# Patient Record
Sex: FEMALE | Race: WHITE | NOT HISPANIC OR LATINO | Employment: FULL TIME | URBAN - METROPOLITAN AREA
[De-identification: names, ages, dates, MRNs, and addresses within clinical notes are randomized per-mention and may not be internally consistent; named-entity substitution may affect disease eponyms.]

---

## 2017-01-23 DIAGNOSIS — M54.9 CHRONIC BACK PAIN GREATER THAN 3 MONTHS DURATION: ICD-10-CM

## 2017-01-23 DIAGNOSIS — M47.896 OTHER OSTEOARTHRITIS OF SPINE, LUMBAR REGION: ICD-10-CM

## 2017-01-23 DIAGNOSIS — G89.29 CHRONIC BACK PAIN GREATER THAN 3 MONTHS DURATION: ICD-10-CM

## 2017-01-23 RX ORDER — HYDROCODONE BITARTRATE AND ACETAMINOPHEN 5; 325 MG/1; MG/1
1 TABLET ORAL EVERY 8 HOURS PRN
Qty: 90 TAB | Refills: 0 | OUTPATIENT
Start: 2017-01-23

## 2017-01-23 RX ORDER — HYDROCODONE BITARTRATE AND ACETAMINOPHEN 5; 325 MG/1; MG/1
1 TABLET ORAL EVERY 8 HOURS PRN
Qty: 90 TAB | Refills: 0 | Status: SHIPPED | OUTPATIENT
Start: 2017-01-23 | End: 2017-04-05 | Stop reason: SDUPTHER

## 2017-01-23 NOTE — TELEPHONE ENCOUNTER
Was the patient seen in the last year in this department? Yes 10/26/2016    Does patient have an active prescription for medications requested? no    Received Request Via: Patient

## 2017-01-24 ENCOUNTER — HOSPITAL ENCOUNTER (OUTPATIENT)
Dept: RADIOLOGY | Facility: MEDICAL CENTER | Age: 57
End: 2017-01-24
Attending: NURSE PRACTITIONER
Payer: COMMERCIAL

## 2017-01-24 DIAGNOSIS — Z12.39 SCREENING FOR BREAST CANCER: ICD-10-CM

## 2017-01-24 PROCEDURE — G0202 SCR MAMMO BI INCL CAD: HCPCS

## 2017-01-25 DIAGNOSIS — R92.8 ABNORMAL MAMMOGRAM OF LEFT BREAST: ICD-10-CM

## 2017-01-27 ENCOUNTER — HOSPITAL ENCOUNTER (OUTPATIENT)
Dept: RADIOLOGY | Facility: MEDICAL CENTER | Age: 57
End: 2017-01-27
Attending: NURSE PRACTITIONER
Payer: COMMERCIAL

## 2017-01-27 DIAGNOSIS — R92.8 ABNORMAL MAMMOGRAM OF LEFT BREAST: ICD-10-CM

## 2017-01-27 PROCEDURE — G0206 DX MAMMO INCL CAD UNI: HCPCS | Mod: LT

## 2017-01-27 PROCEDURE — 76642 ULTRASOUND BREAST LIMITED: CPT | Mod: LT

## 2017-03-10 DIAGNOSIS — M47.896 OTHER OSTEOARTHRITIS OF SPINE, LUMBAR REGION: ICD-10-CM

## 2017-03-10 DIAGNOSIS — G89.29 CHRONIC BACK PAIN GREATER THAN 3 MONTHS DURATION: ICD-10-CM

## 2017-03-10 DIAGNOSIS — M54.9 CHRONIC BACK PAIN GREATER THAN 3 MONTHS DURATION: ICD-10-CM

## 2017-03-10 RX ORDER — HYDROCODONE BITARTRATE AND ACETAMINOPHEN 5; 325 MG/1; MG/1
1 TABLET ORAL EVERY 8 HOURS PRN
Qty: 90 TAB | Refills: 0 | Status: SHIPPED | OUTPATIENT
Start: 2017-03-10 | End: 2017-04-05 | Stop reason: SDUPTHER

## 2017-03-11 NOTE — TELEPHONE ENCOUNTER
Notified Pt on approval and apt needed. States she will make appointment when she comes in to  the rx.

## 2017-04-05 ENCOUNTER — HOSPITAL ENCOUNTER (OUTPATIENT)
Facility: MEDICAL CENTER | Age: 57
End: 2017-04-05
Attending: NURSE PRACTITIONER
Payer: COMMERCIAL

## 2017-04-05 ENCOUNTER — OFFICE VISIT (OUTPATIENT)
Dept: MEDICAL GROUP | Facility: PHYSICIAN GROUP | Age: 57
End: 2017-04-05
Payer: COMMERCIAL

## 2017-04-05 VITALS
WEIGHT: 185 LBS | DIASTOLIC BLOOD PRESSURE: 80 MMHG | OXYGEN SATURATION: 99 % | HEIGHT: 66 IN | TEMPERATURE: 98.8 F | HEART RATE: 61 BPM | SYSTOLIC BLOOD PRESSURE: 126 MMHG | BODY MASS INDEX: 29.73 KG/M2 | RESPIRATION RATE: 16 BRPM

## 2017-04-05 DIAGNOSIS — G89.29 CHRONIC BACK PAIN GREATER THAN 3 MONTHS DURATION: Primary | ICD-10-CM

## 2017-04-05 DIAGNOSIS — Z79.891 CHRONIC USE OF OPIATE DRUGS THERAPEUTIC PURPOSES: ICD-10-CM

## 2017-04-05 DIAGNOSIS — M76.31 IT BAND SYNDROME, RIGHT: ICD-10-CM

## 2017-04-05 DIAGNOSIS — E66.9 OBESITY (BMI 30.0-34.9): ICD-10-CM

## 2017-04-05 DIAGNOSIS — M54.9 CHRONIC BACK PAIN GREATER THAN 3 MONTHS DURATION: Primary | ICD-10-CM

## 2017-04-05 DIAGNOSIS — M47.896 OTHER OSTEOARTHRITIS OF SPINE, LUMBAR REGION: ICD-10-CM

## 2017-04-05 PROCEDURE — G0480 DRUG TEST DEF 1-7 CLASSES: HCPCS

## 2017-04-05 PROCEDURE — 99214 OFFICE O/P EST MOD 30 MIN: CPT | Performed by: NURSE PRACTITIONER

## 2017-04-05 PROCEDURE — 80307 DRUG TEST PRSMV CHEM ANLYZR: CPT

## 2017-04-05 RX ORDER — HYDROCODONE BITARTRATE AND ACETAMINOPHEN 5; 325 MG/1; MG/1
1 TABLET ORAL EVERY 8 HOURS PRN
Qty: 90 TAB | Refills: 0 | Status: SHIPPED | OUTPATIENT
Start: 2017-04-05 | End: 2017-07-18 | Stop reason: SDUPTHER

## 2017-04-05 RX ORDER — HYDROCODONE BITARTRATE AND ACETAMINOPHEN 5; 325 MG/1; MG/1
1 TABLET ORAL EVERY 8 HOURS PRN
Qty: 90 TAB | Refills: 0 | Status: SHIPPED | OUTPATIENT
Start: 2017-04-05 | End: 2017-07-18

## 2017-04-05 ASSESSMENT — ENCOUNTER SYMPTOMS: DEPRESSION: 0

## 2017-04-05 ASSESSMENT — LIFESTYLE VARIABLES: HISTORY_ALCOHOL_USE: 0

## 2017-04-05 NOTE — PATIENT INSTRUCTIONS
Iliotibial Band Syndrome  Iliotibial band syndrome is pain in the outer, lower thigh. The pain is caused by an inflammation of the iliotibial band. This is a band of thick fibrous tissue that runs down the outside of the thigh. The iliotibial band begins at the hip. It extends to the outer side of the shin bone (tibia) just below the knee joint. The band works with the thigh muscles. Together they provide stability to the outside of the knee joint.  Iliotibial band syndrome occurs when there is inflammation to this band of tissue. This is typically due to over use and not due to an injury. The irritation usually occurs over the outside of the knee joint, at the the end of the thigh bone (femur). The iliotibial band crosses bone and muscle at this point. Between these structures is a cushioning sac (bursa). The bursa should make possible a smooth gliding motion. However, when inflamed, the iliotibial band does not glide easily. When inflamed, there is pain with motion of the knee. Usually the pain worsens with continued movement and the pain goes away with rest.  This problem usually arises when there is a sudden increase in sports activities involving your legs. Running, and playing soccer or basketball are examples of activities causing this. Others who are prone to iliotibial band syndrome include individuals with mechanical problems such as leg length differences, abnormality of walking, bowed legs etc.  HOME CARE INSTRUCTIONS   · Apply ice to the injured area:  ¨ Put ice in a plastic bag.  ¨ Place a towel between your skin and the bag.  ¨ Leave the ice on for 20 minutes, 2-3 times a day.  · Limit excessive training or eliminate training until pain goes away.  · While pain is present, you may use gentle range of motion. Do not resume regular use until instructed by your health care provider. Begin use gradually. Do not increase activity to the point of pain. If pain does develop, decrease activity and continue  the above measures. Gradually increase activities that do not cause discomfort. Do this until you finally achieve normal use.  · Perform low-impact activities while pain is present. Wear proper footwear.  · Only take over-the-counter or prescription medicines for pain, discomfort, or fever as directed by your health care provider.  SEEK MEDICAL CARE IF:   · Your pain increases or pain is not controlled with medications.  · You develop new, unexplained symptoms, or an increase of the symptoms that brought you to your health care provider.  · Your pain and symptoms are not improving or are getting worse.     This information is not intended to replace advice given to you by your health care provider. Make sure you discuss any questions you have with your health care provider.     Document Released: 06/09/2003 Document Revised: 01/08/2016 Document Reviewed: 07/17/2014  [employer] Interactive Patient Education ©2016 [employer]

## 2017-04-06 DIAGNOSIS — M76.31 IT BAND SYNDROME, RIGHT: ICD-10-CM

## 2017-04-06 DIAGNOSIS — M54.9 CHRONIC BACK PAIN GREATER THAN 3 MONTHS DURATION: ICD-10-CM

## 2017-04-06 DIAGNOSIS — M47.896 OTHER OSTEOARTHRITIS OF SPINE, LUMBAR REGION: ICD-10-CM

## 2017-04-06 DIAGNOSIS — Z79.891 CHRONIC USE OF OPIATE DRUGS THERAPEUTIC PURPOSES: ICD-10-CM

## 2017-04-06 DIAGNOSIS — G89.29 CHRONIC BACK PAIN GREATER THAN 3 MONTHS DURATION: ICD-10-CM

## 2017-04-06 LAB — AMBIGUOUS DTTM AMBI4: NORMAL

## 2017-04-06 NOTE — PROGRESS NOTES
Chief Complaint   Patient presents with   • Medication Refill     norco       HISTORY OF PRESENT ILLNESS: Patient is a 57 y.o. female established patient who presents today to discuss pain medication refills:    Chronic pain recheck:   Last dose of controlled substance: 4/5/17  Chronic pain treated with Norco 5/325 taken 2-3 times a day    She  reports that she drinks alcohol.  She  reports that she does not use illicit drugs.    Consequences of Chronic Opiate therapy:  (5 A's)  Analgesia: Compared to no treatment or prior treatment, pain is currently not changed  Activity: not changed  Adverse Events: She denies constipation, dry mouth, itchy skin, nausea and sedation  Aberrant Behaviors: She reports she is taking medication as prescribed and is not veering from agreed treatment regimen. There have been no inappropriate refills or lost/stolen meds reported.   Affect/Mood: Pain is not impacting patient's mood.  Patient denies depression/anxiety.    Nonnarcotic treatments that are being used: Tylenol.   Treatment goals discussed.    Opioid Risk Score: 0    Interpretation of Opioid Risk Score   Score 0-3 = Low risk of abuse. Do UDS at least once per year.  Score 4-7 = Moderate risk of abuse. Do UDS 1-4 times per year.  Score 8+ = High risk of abuse. Refer to specialist.    Last order of CONTROLLED SUBSTANCE TREATMENT AGREEMENT was found on 4/5/2017 from Office Visit on 4/5/2017     UDS Summary     Patient has no health maintenance due at this time        Most recent UDS reviewed today and is consistent with prescribed medications.    I have reviewed the medical records, the Prescription Monitoring Program and I have determined that controlled substance treatment is medically indicated.    Allergies:Review of patient's allergies indicates no known allergies.    Current Outpatient Prescriptions Ordered in [facility]   Medication Sig Dispense Refill   • hydrocodone-acetaminophen (NORCO) 5-325 MG Tab per tablet Take 1 Tab by  mouth every 8 hours as needed. 90 Tab 0   • hydrocodone-acetaminophen (NORCO) 5-325 MG Tab per tablet Take 1 Tab by mouth every 8 hours as needed. 90 Tab 0   • hydrocodone-acetaminophen (NORCO) 5-325 MG Tab per tablet Take 1 Tab by mouth every 8 hours as needed. 90 Tab 0   • lisinopril (PRINIVIL) 20 MG Tab TAKE 1 TABLET BY MOUTH EVERY DAY. 90 Tab 1   • amlodipine (NORVASC) 5 MG Tab TAKE 1 TABLET BY MOUTH EVERY DAY 90 Tab 1   • Multiple Vitamins-Minerals (ONE-A-DAY 50 PLUS PO) Take  by mouth every day.     • ibuprofen (MOTRIN) 200 MG TABS Take 200 mg by mouth every 6 hours as needed.       No current Epic-ordered facility-administered medications on file.       Past Medical History   Diagnosis Date   • Arthritis    • Hypertension    • Varicose vein    • Hypertension 4/15/2013   • Active smoker 4/15/2013   • Chronic back pain greater than 3 months duration 3/23/2015       Social History   Substance Use Topics   • Smoking status: Former Smoker -- 1.00 packs/day for 31 years     Types: Cigarettes   • Smokeless tobacco: Current User      Comment: electronic cigarette   • Alcohol Use: 0.0 oz/week     0 Standard drinks or equivalent per week      Comment: 3-4  beer q week       Family Status   Relation Status Death Age   • Mother     • Father     • Sister Alive      Family History   Problem Relation Age of Onset   • Hyperlipidemia Mother    • Heart Disease Mother    • Diabetes Mother    • Thyroid Mother    • Hyperlipidemia Father    • Cancer Father      leukemia due to chemical exposure   • Thyroid Sister    • Stroke Neg Hx        ROS: see above    Review of Systems   Constitutional: Negative for fever, chills, weight loss and malaise/fatigue.   HENT: Negative for ear pain, nosebleeds, congestion, sore throat and neck pain.    Eyes: Negative for blurred vision.   Respiratory: Negative for cough, sputum production, shortness of breath and wheezing.    Cardiovascular: Negative for chest pain, palpitations,  "orthopnea and leg swelling.   Gastrointestinal: Negative for heartburn, nausea, vomiting and abdominal pain.   Genitourinary: Negative for dysuria, urgency and frequency.   Musculoskeletal: Negative for myalgias, back pain and joint pain.   Skin: Negative for rash and itching.   Neurological: Negative for dizziness, tingling, tremors, sensory change, focal weakness and headaches.   Endo/Heme/Allergies: Does not bruise/bleed easily.   Psychiatric/Behavioral: Negative for depression, suicidal ideas and memory loss.  The patient is not nervous/anxious and does not have insomnia.        Exam:  Blood pressure 126/80, pulse 61, temperature 37.1 °C (98.8 °F), resp. rate 16, height 1.664 m (5' 5.5\"), weight 83.915 kg (185 lb), SpO2 99 %.  General:  Well nourished, well developed female in NAD  Head is grossly normal.  Neck: Thyroid is not enlarged.  Pulmonary: Normal effort.   Cardiovascular: Regular rate.   Extremities: no clubbing, cyanosis, or edema.  Psych:  Mood and affect are normal.  Answering questions appropriately with good eye contact.      Please note that this dictation was created using voice recognition software. I have made every reasonable attempt to correct obvious errors, but I expect that there are errors of grammar and possibly content that I did not discover before finalizing the note.    Assessment/Plan:    1. Chronic back pain greater than 3 months duration  hydrocodone-acetaminophen (NORCO) 5-325 MG Tab per tablet    hydrocodone-acetaminophen (NORCO) 5-325 MG Tab per tablet    hydrocodone-acetaminophen (NORCO) 5-325 MG Tab per tablet    CONTROLLED SUBSTANCE TREATMENT AGREEMENT    PAIN MANAGEMENT PANEL, SCRN W/ RFLX TO QNT   2. IT band syndrome, right  hydrocodone-acetaminophen (NORCO) 5-325 MG Tab per tablet    hydrocodone-acetaminophen (NORCO) 5-325 MG Tab per tablet    hydrocodone-acetaminophen (NORCO) 5-325 MG Tab per tablet    CONTROLLED SUBSTANCE TREATMENT AGREEMENT    PAIN MANAGEMENT PANEL, " SCRN W/ RFLX TO QNT   3. Other osteoarthritis of spine, lumbar region  hydrocodone-acetaminophen (NORCO) 5-325 MG Tab per tablet    hydrocodone-acetaminophen (NORCO) 5-325 MG Tab per tablet    hydrocodone-acetaminophen (NORCO) 5-325 MG Tab per tablet    CONTROLLED SUBSTANCE TREATMENT AGREEMENT    PAIN MANAGEMENT PANEL, SCRN W/ RFLX TO QNT   4. Chronic use of opiate drugs therapeutic purposes  hydrocodone-acetaminophen (NORCO) 5-325 MG Tab per tablet    hydrocodone-acetaminophen (NORCO) 5-325 MG Tab per tablet    hydrocodone-acetaminophen (NORCO) 5-325 MG Tab per tablet    CONTROLLED SUBSTANCE TREATMENT AGREEMENT    PAIN MANAGEMENT PANEL, SCRN W/ RFLX TO QNT   5. Obesity (BMI 30.0-34.9)  Patient identified as having weight management issue.  Appropriate orders and counseling given.        1.  Pain medication refilled as previously prescribed, stable and well controlled.  2.  Updated urine drug screen and pain contract today.  3.  Return to clinic in 3 months, sooner if needed.

## 2017-04-07 LAB
AMPHET CTO UR CFM-MCNC: NEGATIVE NG/ML
BARBITURATES CTO UR CFM-MCNC: NEGATIVE NG/ML
BENZODIAZ CTO UR CFM-MCNC: NEGATIVE NG/ML
BUPRENORPHINE UR-MCNC: NEGATIVE NG/ML
CANNABINOIDS CTO UR CFM-MCNC: NEGATIVE NG/ML
CARISOPRODOL UR-MCNC: NEGATIVE NG/ML
COCAINE CTO UR CFM-MCNC: NEGATIVE NG/ML
DRUG SCREEN COMMENT UR-IMP: NORMAL
ETHYL GLUCURONIDE UR QL SCN: NEGATIVE NG/ML
FENTANYL UR-MCNC: NEGATIVE NG/ML
MEPERIDINE CTO UR SCN-MCNC: NEGATIVE NG/ML
METHADONE CTO UR CFM-MCNC: NEGATIVE NG/ML
OPIATES UR QL SCN: POSITIVE NG/ML
OXYCDOXYM URSCRN 2005102: NEGATIVE NG/ML
PCP CTO UR CFM-MCNC: NEGATIVE NG/ML
PROPOXYPH CTO UR CFM-MCNC: NEGATIVE NG/ML
TAPENTADOL UR-MCNC: NEGATIVE NG/ML
TRAMADOL CTO UR SCN-MCNC: NEGATIVE NG/ML
ZOLPIDEM UR-MCNC: NEGATIVE NG/ML

## 2017-04-09 LAB
6MAM UR CFM-MCNC: <10 NG/ML
CODEINE UR CFM-MCNC: <20 NG/ML
HYDROCODONE UR CFM-MCNC: 128 NG/ML
HYDROMORPHONE UR CFM-MCNC: <20 NG/ML
MORPHINE UR CFM-MCNC: <20 NG/ML
NORHYDROCODONE UR CFM-MCNC: 338 NG/ML
NOROXYCODONE UR CFM-MCNC: <20 NG/ML
OPIATES UR NOROXYM Q0836: <20 NG/ML
OXYCODONE UR CFM-MCNC: <20 NG/ML
OXYMORPHONE UR CFM-MCNC: <20 NG/ML

## 2017-05-03 DIAGNOSIS — I10 ESSENTIAL HYPERTENSION: ICD-10-CM

## 2017-05-03 RX ORDER — AMLODIPINE BESYLATE 5 MG/1
TABLET ORAL
Qty: 90 TAB | Refills: 0 | Status: SHIPPED | OUTPATIENT
Start: 2017-05-03 | End: 2017-07-29 | Stop reason: SDUPTHER

## 2017-05-03 RX ORDER — LISINOPRIL 20 MG/1
TABLET ORAL
Qty: 90 TAB | Refills: 0 | Status: SHIPPED | OUTPATIENT
Start: 2017-05-03 | End: 2017-07-29 | Stop reason: SDUPTHER

## 2017-05-03 NOTE — TELEPHONE ENCOUNTER
Was the patient seen in the last year in this department? Yes     Does patient have an active prescription for medications requested? No     Received Request Via: Pharmacy      Pt met protocol?: Yes pt last ov 4/2017   BP Readings from Last 1 Encounters:   04/05/17 126/80

## 2017-07-14 ENCOUNTER — OFFICE VISIT (OUTPATIENT)
Dept: PHYSICAL MEDICINE AND REHAB | Facility: MEDICAL CENTER | Age: 57
End: 2017-07-14
Payer: COMMERCIAL

## 2017-07-14 VITALS
HEIGHT: 66 IN | HEART RATE: 77 BPM | SYSTOLIC BLOOD PRESSURE: 118 MMHG | WEIGHT: 190 LBS | DIASTOLIC BLOOD PRESSURE: 78 MMHG | OXYGEN SATURATION: 97 % | TEMPERATURE: 97.4 F | BODY MASS INDEX: 30.53 KG/M2

## 2017-07-14 DIAGNOSIS — M54.50 LUMBOSACRAL PAIN: ICD-10-CM

## 2017-07-14 DIAGNOSIS — M47.816 SPONDYLOSIS OF LUMBAR REGION WITHOUT MYELOPATHY OR RADICULOPATHY: ICD-10-CM

## 2017-07-14 DIAGNOSIS — M79.18 MYOFASCIAL PAIN: ICD-10-CM

## 2017-07-14 DIAGNOSIS — M54.9 MID BACK PAIN: ICD-10-CM

## 2017-07-14 DIAGNOSIS — M54.16 LUMBAR RADICULITIS: ICD-10-CM

## 2017-07-14 DIAGNOSIS — M70.71 BURSITIS OF RIGHT HIP, UNSPECIFIED BURSA: ICD-10-CM

## 2017-07-14 DIAGNOSIS — M41.9 SCOLIOSIS OF LUMBAR SPINE, UNSPECIFIED SCOLIOSIS TYPE: ICD-10-CM

## 2017-07-14 DIAGNOSIS — Z87.39: ICD-10-CM

## 2017-07-14 DIAGNOSIS — M25.551 RIGHT HIP PAIN: ICD-10-CM

## 2017-07-14 DIAGNOSIS — M51.34 DDD (DEGENERATIVE DISC DISEASE), THORACIC: ICD-10-CM

## 2017-07-14 DIAGNOSIS — M51.36 DDD (DEGENERATIVE DISC DISEASE), LUMBAR: ICD-10-CM

## 2017-07-14 PROCEDURE — 99204 OFFICE O/P NEW MOD 45 MIN: CPT | Performed by: PHYSICAL MEDICINE & REHABILITATION

## 2017-07-14 RX ORDER — ACETAMINOPHEN 500 MG
500-1000 TABLET ORAL EVERY 6 HOURS PRN
COMMUNITY

## 2017-07-14 ASSESSMENT — ENCOUNTER SYMPTOMS
SENSORY CHANGE: 1
BACK PAIN: 1
TINGLING: 1
PALPITATIONS: 0
VOMITING: 0
MYALGIAS: 1
NAUSEA: 0
CHILLS: 0
COUGH: 0
FEVER: 0
DOUBLE VISION: 0
HEMOPTYSIS: 0
BLURRED VISION: 0

## 2017-07-14 NOTE — MR AVS SNAPSHOT
"        Jayashree Anthony   2017 8:30 AM   Office Visit   MRN: [id number]    Department:  [facility]   Dept Phone:  [phone]    Description:  Female : 1960   Provider:  Chase Rodriguez M.D.           Reason for Visit     New Patient           Allergies as of 2017     No Known Allergies      You were diagnosed with     Mid back pain   [688809]       Right hip pain   [345580]       DDD (degenerative disc disease), thoracic   [461829]       Lumbosacral pain   [445176]       DDD (degenerative disc disease), lumbar   [983160]       Scoliosis of lumbar spine, unspecified scoliosis type   [4429317]         Vital Signs     Blood Pressure Pulse Temperature Height Weight Body Mass Index    118/78 mmHg 77 36.3 °C (97.4 °F) 1.676 m (5' 5.98\") 86.183 kg (190 lb) 30.68 kg/m2    Oxygen Saturation Smoking Status                97% Former Smoker          Basic Information     Date Of Birth Sex Race Ethnicity Preferred Language    1960 Female White Non- English      Your appointments     Aug 09, 2017  8:00 AM   Follow Up Visit with Chase Rodriguez M.D.   [facility] PHYSI[facility] (--)    [address] [facility] [address]  [facility] [address] [phone]           You will be receiving a confirmation call a few days before your appointment from our automated call confirmation system.              Problem List              ICD-10-CM Priority Class Noted - Resolved    Hypertension I10   4/15/2013 - Present    Routine health maintenance Z00.00   4/15/2013 - Present    Hypokalemia E87.6   2013 - Present    Chronic back pain greater than 3 months duration M54.9, G89.29   3/23/2015 - Present    Nicotine vapor product user Z78.9   3/28/2016 - Present      Health Maintenance        Date Due Completion Dates    IMM DTaP/Tdap/Td Vaccine (1 - Tdap) 1979 ---    PAP SMEAR 2016    IMM INFLUENZA (1) 2017, 2016    MAMMOGRAM 2018, 2017, 3/23/2015 " (Postponed)    Override on 3/23/2015: Postponed    COLONOSCOPY 3/23/2025 3/23/2015 (Postponed)    Override on 3/23/2015: Postponed            Current Immunizations     Influenza Vaccine Quad Inj (Pf) 9/19/2016, 9/14/2016  1:11 PM      Below and/or attached are the medications your provider expects you to take. Review all of your home medications and newly ordered medications with your provider and/or pharmacist. Follow medication instructions as directed by your provider and/or pharmacist. Please keep your medication list with you and share with your provider. Update the information when medications are discontinued, doses are changed, or new medications (including over-the-counter products) are added; and carry medication information at all times in the event of emergency situations     Allergies:  No Known Allergies          Medications  Valid as of: July 14, 2017 -  9:20 AM    Generic Name Brand Name Tablet Size Instructions for use    Acetaminophen (Tab) TYLENOL 500 MG Take 500-1,000 mg by mouth every 6 hours as needed.        AmLODIPine Besylate (Tab) NORVASC 5 MG TAKE 1 TABLET BY MOUTH EVERY DAY        Hydrocodone-Acetaminophen (Tab) NORCO 5-325 MG Take 1 Tab by mouth every 8 hours as needed.        Hydrocodone-Acetaminophen (Tab) NORCO 5-325 MG Take 1 Tab by mouth every 8 hours as needed.        Hydrocodone-Acetaminophen (Tab) NORCO 5-325 MG Take 1 Tab by mouth every 8 hours as needed.        Ibuprofen (Tab) MOTRIN 200 MG Take 200 mg by mouth every 6 hours as needed.        Lisinopril (Tab) PRINIVIL 20 MG TAKE 1 TABLET BY MOUTH EVERY DAY.        Multiple Vitamins-Minerals   Take  by mouth every day.        .                 Medicines prescribed today were sent to:     [id number] - [place], [place] - [address] [place] [address]    [address] [place] [address] [place] [address]    Phone: [phone] Fax: [phone]    Open 24 Hours?: No      Medication refill instructions:       If your prescription bottle indicates you have  medication refills left, it is not necessary to call your provider’s office. Please contact your pharmacy and they will refill your medication.    If your prescription bottle indicates you do not have any refills left, you may request refills at any time through one of the following ways: The online [employer] system (except Urgent Care), by calling your provider’s office, or by asking your pharmacy to contact your provider’s office with a refill request. Medication refills are processed only during regular business hours and may not be available until the next business day. Your provider may request additional information or to have a follow-up visit with you prior to refilling your medication.   *Please Note: Medication refills are assigned a new Rx number when refilled electronically. Your pharmacy may indicate that no refills were authorized even though a new prescription for the same medication is available at the pharmacy. Please request the medicine by name with the pharmacy before contacting your provider for a refill.        Your To Do List     Future Labs/Procedures Complete By Expires    DX-HIP-COMPLETE - UNILATERAL 2+ RIGHT  As directed 7/14/2018    DX-THORACIC SPINE-2 VIEWS  As directed 7/14/2018      Referral     A referral request has been sent to our patient care coordination department. Please allow 3-5 business days for us to process this request and contact you either by phone or mail. If you do not hear from us by the 5th business day, please call us at [phone].           [employer] Access Code: Activation code not generated  Current [employer] Status: Active          Quit Tobacco Information     Do you want to quit using tobacco?    Quitting tobacco decreases risks of cancer, heart and lung disease, increases life expectancy, improves sense of taste and smell, and increases spending money, among other benefits.    If you are thinking about quitting, we can help.  • [facility] Quit Tobacco Program:  [phone]  o Program occurs weekly for four weeks and includes pharmacist consultation on products to support quitting smoking or chewing tobacco. A provider referral is needed for pharmacist consultation.  • Tobacco Users Help Hotline: 4-800QUIT-NOW (026-1600) or https://[place].quitlogix.org/  o Free, confidential telephone and online coaching for [place] residents. Sessions are designed on a schedule that is convenient for you. Eligible clients receive free nicotine replacement therapy.  • Nationally: www.smokefree.gov  o Information and professional assistance to support both immediate and long-term needs as you become, and remain, a non-smoker. Smokefree.gov allows you to choose the help that best fits your needs.

## 2017-07-14 NOTE — PROGRESS NOTES
Subjective:      Jayashree Anthony is a 57 y.o. female who presents with New Patient      Chief complaint: Low back pain, right hip pain        HPI   The patient notes long history of back pain, notes diagnosed with Scheuermann's disease when she was younger, nonsurgical management. She has also been involved with some physical activities in the past, without specific trauma.    Regarding today's visit:    The patient notes ongoing pain in the right greater than left lumbosacral region, notes intermittent radiating pain to the right hip/thigh region with neuropathic component, worse with activities, also notes lower limb cramping at night.    She notes right hip area pain, most prominent in the lateral aspect, limiting walking tolerance.    She notes intermittent lower thoracic pain, without radicular component.    She has had prior cervical spine surgery through [facility], symptoms control.    She has had prior treatment with medications. She has tried modalities. No bowel/bladder dysfunction noted. No overt lower limb weakness noted. The ongoing pain limits her ability to function, including standing and walking tolerance. She is inquiring about additional treatment options.      MEDICAL RECORDS REVIEW/DATA REVIEW: Reviewed in epic.    Records Reviewed: Reviewed referring provider notes.     I reviewed medications. Medications per primary care provider    I reviewed  profile 7/14/2017.     I reviewed diagnostic studies:     I reviewed radiographs. Reviewed MRI lumbar spine 12/2016, images and report, see below. Review lumbar spine x-rays 12/2016, images and report, see below. Reviewed prior cervical spine imaging.    I reviewed lab studies. Reviewed CMP 12/2016. A1C 12/2016 of 5.2. Reviewed urine drug screen 4/2017.    I reviewed medical issues.     I reviewed family history: No neuromuscular disorders noted.    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  Low back pain. Bilateral leg numbness and tingling.    TECHNIQUE/EXAM DESCRIPTION:  MRI of the lumbar spine without contrast.    The study was performed on a retickr Signa 1.5 Nasreen MRI scanner.  T1 sagittal, T2 fast spin-echo sagittal, T2 fat-suppressed sagittal, and T2 axial images were obtained of the lumbar spine. Additional T1 coronal images were also obtained.    COMPARISON:  Plain films lumbar spine 1/9/2013    FINDINGS:  Alignment in the lumbar spine shows sigmoid scoliosis with dominant curvature convex left in the upper lumbar spine with a Cedeno angle of 23 degrees, apex at the L2 level. There is a lesser curvature convex right in the lower lumbar spine with a Cedeno   angle of about to 16 degrees, apex at the L4 level. There is also some right lateral subluxation of L1 on L2 and left lateral subluxation of L2 on L3 and L3 on L4. There is mild retrolisthesis of L1 on L2 and L2 on L3. There is negligible retrolisthesis   of L5 on S1.    Marrow signal shows moderate Modic type I discogenic endplate marrow edema at L1-L2. There are multilevel Schmorl's node endplate irregularities at T11-T12 through L4-5.    The prevertebral and paraspinous soft tissues are unremarkable.    The conus is normal in position and signal with its tip at the L1-L2 level.    There is advanced degenerative disc space narrowing at L1-2, L2-3, L4-5, L5-S1. Disc space narrowing is asymmetric depending on the scoliosis orientation.    At T12-L1, there is no significant disk bulge or protrusion.  There is no central stenosis or foraminal stenosis.  There is no significant ligamentous or facet hypertrophy.    At L1-2, there is mild posterior marginal spurring and disc bulging with pseudo-disc bulging accentuated by the retrolisthesis. There is a small impression on the ventral thecal sac without kiran central stenosis. There is moderate right foraminal   stenosis and borderline left  inferior foraminal stenosis.    At L2-3, there is a broad-based disc-osteophyte complex. There is a small impression on the ventral thecal sac without central stenosis. There is marked right foraminal stenosis. There is borderline inferior foraminal narrowing on the left.    At L3-4, there is a broad-based disc-osteophyte complex. There is mild hypertrophic facet arthropathy. There is borderline left lateral recess stenosis. There is no central stenosis. The right neural foramen shows no significant stenosis. There is mild   left foraminal stenosis.    At L4-5, there is diffuse posterior marginal endplate spurring. There is mild hypertrophic facet arthropathy, left greater than right. There is no central stenosis. There is mild right foraminal stenosis. There is moderate-marked left foraminal stenosis.    At L5-S1, there is mild diffuse disc bulging with some posterior marginal spurring. The thecal sac is generous with no central stenosis. There is minor facet arthropathy. There is moderate left foraminal stenosis and moderate right foraminal stenosis.         Impression        1.  Sigmoid scoliosis lumbar spine with dominant curvature convex left in the upper lumbar spine. Associated lateral subluxations and retrolisthesis as detailed above.  2.  Multilevel degenerative disc disease and spondylotic changes as detailed for each level above in the body of report. No kiran central stenosis at any lumbar level. Multilevel foraminal stenoses as outlined above.       12/19/2016 2:27 PM    HISTORY/REASON FOR EXAM:  Atraumatic Pain      TECHNIQUE/ EXAM DESCRIPTION AND NUMBER OF VIEWS:  3 views of the lumbar spine.    COMPARISON: 1/9/2013.    FINDINGS:  There is leftward curvature of the lumbar spine. Cedeno angle measurements 24 degrees compared to 22 degrees previously. No compression fracture is identified. Mild loss of height of the superior endplates of T12 and T11 are unchanged. Minimal wedge   deformity of L1 is also  stable. There is 4.5 mm retrolisthesis of L2 on L3, unchanged. There is multilevel intervertebral disc space narrowing and disc vacuum phenomenon. Mild endplate spurring is seen at multiple levels. There are degenerative changes   of the facet joints. Atherosclerotic calcification is seen. There is a moderate amount of colonic stool. Calcific densities in the pelvis likely represent phleboliths.           Impression        Prominent leftward curvature of the lumbar spine is again noted.    Multilevel degenerative changes.    Minimal retrolisthesis of L2 on L3.    Facet arthropathy.       PAST MEDICAL HISTORY:   Past Medical History   Diagnosis Date   • Arthritis    • Hypertension    • Varicose vein    • Hypertension 4/15/2013   • Active smoker 4/15/2013   • Chronic back pain greater than 3 months duration 3/23/2015       PAST SURGICAL HISTORY:    Past Surgical History   Procedure Laterality Date   • Cervical fusion posterior  12/27/2011     Performed by ROBERT SIGALA at SURGERY [facility]   • Cervical laminectomy posterior  12/27/2011     Performed by ROBERT SIGALA at SURGERY [facility]   • Other orthopedic surgery  1968     right  arm orif       ALLERGIES:  Review of patient's allergies indicates no known allergies.    MEDICATIONS:    Outpatient Encounter Prescriptions as of 7/14/2017   Medication Sig Dispense Refill   • acetaminophen (TYLENOL) 500 MG Tab Take 500-1,000 mg by mouth every 6 hours as needed.     • amlodipine (NORVASC) 5 MG Tab TAKE 1 TABLET BY MOUTH EVERY DAY 90 Tab 0   • lisinopril (PRINIVIL) 20 MG Tab TAKE 1 TABLET BY MOUTH EVERY DAY. 90 Tab 0   • hydrocodone-acetaminophen (NORCO) 5-325 MG Tab per tablet Take 1 Tab by mouth every 8 hours as needed. 90 Tab 0   • hydrocodone-acetaminophen (NORCO) 5-325 MG Tab per tablet Take 1 Tab by mouth every 8 hours as needed. 90 Tab 0   • Multiple Vitamins-Minerals (ONE-A-DAY 50 PLUS PO) Take  by mouth every day.     • hydrocodone-acetaminophen  "(NORCO) 5-325 MG Tab per tablet Take 1 Tab by mouth every 8 hours as needed. 90 Tab 0   • ibuprofen (MOTRIN) 200 MG TABS Take 200 mg by mouth every 6 hours as needed.       No facility-administered encounter medications on file as of 7/14/2017.       SOCIAL HISTORY:    Social History     Social History   • Marital Status:      Spouse Name: N/A   • Number of Children: N/A   • Years of Education: N/A     Social History Main Topics   • Smoking status: Former Smoker -- 1.00 packs/day for 31 years     Types: Cigarettes     Quit date: 07/14/2015   • Smokeless tobacco: Current User      Comment: electronic cigarette   • Alcohol Use: 0.0 oz/week     0 Standard drinks or equivalent per week      Comment: 3-4  beer q week   • Drug Use: No   • Sexual Activity:     Partners: Male      Comment:      Other Topics Concern   •  Service No   • Blood Transfusions No   • Caffeine Concern No   • Occupational Exposure No   • Hobby Hazards Yes     motorcycle riding    • Sleep Concern Yes     pain   • Stress Concern Yes   • Weight Concern Yes   • Special Diet No   • Back Care Yes   • Exercise Yes     tries    • Bike Helmet No   • Seat Belt Yes   • Self-Exams Yes     Social History Narrative       Review of Systems   Constitutional: Negative for fever and chills.   HENT: Negative for hearing loss and tinnitus.    Eyes: Negative for blurred vision and double vision.   Respiratory: Negative for cough and hemoptysis.    Cardiovascular: Negative for chest pain and palpitations.   Gastrointestinal: Negative for nausea and vomiting.   Genitourinary: Negative for urgency and frequency.   Musculoskeletal: Positive for myalgias, back pain and joint pain.   Skin: Negative.    Neurological: Positive for tingling and sensory change.   Endo/Heme/Allergies: Negative.    All other systems reviewed and are negative.        Objective:     /78 mmHg  Pulse 77  Temp(Src) 36.3 °C (97.4 °F)  Ht 1.676 m (5' 5.98\")  Wt 86.183 kg " (190 lb)  BMI 30.68 kg/m2  SpO2 97%     Physical Exam  Constitutional: oriented to person, place, and time, appears well-developed and well-nourished.   HEENT: Normocephalic atraumatic, neck supple, no JVD noted, no masses noted, no meningeal signs noted  Lymphadenopathy: no cervical, supraclavicular, or inguinal lymphadenopathy noted  Cardiovascular: Intact distal pulses, including at ankles, no limb swelling noted  Pulmonary: No tachypnea noted, no accessory muscle use noted, no dyspnea noted  Abdominal: Soft, nontender, exhibits no distension, no peritoneal signs, no HSM  Musculoskeletal:   Right hip: exhibits  tenderness most prominent lateral hip over trochanteric bursa, reproducing pain. pain with range of motion testing  Left hip: exhibits only mild tenderness. Minimal pain with range of motion testing  Lumbar back: exhibits decreased range of motion, tenderness and pain. negative straight leg testing, trigger points noted, tender right sacroiliac joint region, pain noted with quadrant loading and extension to the right, scoliosis noted  Thoracic: Tender with palpation lower thoracic region, pain with range of motion testing, scoliosis noted  Neurological: oriented to person, place, and time. Cranial nerves grossly intact, normal strength. Sensation intact distally. Reflexes 1+ in lower limbs, Gait mildly antalgic, reciprocal, No upper motor neuron signs evident  Skin: Skin is intact. no rashes or lesions noted  Psychiatric: normal mood and affect. speech is normal and behavior is normal. Judgment and thought content normal. Cognition and memory are normal.        Assessment/Plan:       ASSESSMENT:    1. Lumbosacral pain, myofascial pain, intermittent lumbar radiculitis, foraminal stenosis, listhesis, degenerative disc disease, facet arthropathy, lumbar spondylosis, scoliosis    - REFERRAL TO PHYSICAL THERAPY Reason for Therapy: Eval/Treat/Report  - Reviewed injection therapy with trigger point injections,  submit authorization request    2. Mid back pain, myofascial pain, degenerative disc disease, H/O Scheuermann disease, scoliosis    - DX-THORACIC SPINE-2 VIEWS; Future  - REFERRAL TO PHYSICAL THERAPY Reason for Therapy: Eval/Treat/Report    3. Right hip pain, sprain strain, bursitis/tendinopathy    - DX-HIP-COMPLETE - UNILATERAL 2+ RIGHT; Future  - REFERRAL TO PHYSICAL THERAPY Reason for Therapy: Eval/Treat/Report  - Reviewed injection therapy with right hip bursa injection, cement authorization request    4. History of cervical spine surgery, symptomatically controlled    5. Comorbid medical issues, with care per primary care provider      DISCUSSION/PLAN:    - I discussed management options. I reviewed symptomatic care    - I reviewed home exercise program, with medical precautions    - The patient can consider complementary trials with acupuncture, massage therapy, or TENS unit    - I reviewed medication monitoring. Medications per primary care provider. I reviewed further symptomatic medications, consider trial with muscle relaxant, gabapentin, or lidoderm patch. I did not prescribe any medications today.    - I reviewed additional diagnostic options, including further/advanced imaging, including lumbar spine flexion/extension radiograph, electrodiagnostic testing, vascular studies, and further lab screening    - I reviewed additional therapeutic options, including injection/interventional therapy and additional consultative input.    - I reviewed psychosocial interventions    - Coordinate follow-up after reviewing her results from above-noted diagnostic studies, getting injection authorization in place,  or an as-needed basis      Please note that this dictation was created using voice recognition software. I have made every reasonable attempt to correct obvious errors but there may be errors of grammar and content that I may have overlooked prior to finalization of this note.

## 2017-07-17 ENCOUNTER — HOSPITAL ENCOUNTER (OUTPATIENT)
Dept: RADIOLOGY | Facility: MEDICAL CENTER | Age: 57
End: 2017-07-17
Attending: PHYSICAL MEDICINE & REHABILITATION
Payer: COMMERCIAL

## 2017-07-17 DIAGNOSIS — M25.551 RIGHT HIP PAIN: ICD-10-CM

## 2017-07-17 DIAGNOSIS — M51.34 DDD (DEGENERATIVE DISC DISEASE), THORACIC: ICD-10-CM

## 2017-07-17 DIAGNOSIS — M54.9 MID BACK PAIN: ICD-10-CM

## 2017-07-17 PROCEDURE — 72070 X-RAY EXAM THORAC SPINE 2VWS: CPT

## 2017-07-17 PROCEDURE — 73502 X-RAY EXAM HIP UNI 2-3 VIEWS: CPT | Mod: RT

## 2017-07-18 DIAGNOSIS — Z79.891 CHRONIC USE OF OPIATE DRUGS THERAPEUTIC PURPOSES: ICD-10-CM

## 2017-07-18 DIAGNOSIS — M54.9 CHRONIC BACK PAIN GREATER THAN 3 MONTHS DURATION: ICD-10-CM

## 2017-07-18 DIAGNOSIS — M76.31 IT BAND SYNDROME, RIGHT: ICD-10-CM

## 2017-07-18 DIAGNOSIS — G89.29 CHRONIC BACK PAIN GREATER THAN 3 MONTHS DURATION: ICD-10-CM

## 2017-07-18 RX ORDER — HYDROCODONE BITARTRATE AND ACETAMINOPHEN 5; 325 MG/1; MG/1
1 TABLET ORAL EVERY 8 HOURS PRN
Qty: 90 TAB | Refills: 0 | Status: SHIPPED | OUTPATIENT
Start: 2017-07-18 | End: 2017-09-21 | Stop reason: SDUPTHER

## 2017-07-18 NOTE — TELEPHONE ENCOUNTER
Was the patient seen in the last year in this department? Yes 04/05/2017    Does patient have an active prescription for medications requested? No     Received Request Via: Patient

## 2017-07-21 ENCOUNTER — OFFICE VISIT (OUTPATIENT)
Dept: PHYSICAL MEDICINE AND REHAB | Facility: MEDICAL CENTER | Age: 57
End: 2017-07-21
Payer: COMMERCIAL

## 2017-07-21 VITALS
SYSTOLIC BLOOD PRESSURE: 112 MMHG | DIASTOLIC BLOOD PRESSURE: 64 MMHG | BODY MASS INDEX: 30.37 KG/M2 | HEART RATE: 76 BPM | OXYGEN SATURATION: 97 % | TEMPERATURE: 98.1 F | HEIGHT: 66 IN | WEIGHT: 189 LBS

## 2017-07-21 DIAGNOSIS — M51.35 DDD (DEGENERATIVE DISC DISEASE), THORACOLUMBAR: ICD-10-CM

## 2017-07-21 DIAGNOSIS — M16.10 HIP ARTHRITIS: ICD-10-CM

## 2017-07-21 DIAGNOSIS — M79.18 MYOFASCIAL PAIN: ICD-10-CM

## 2017-07-21 DIAGNOSIS — M70.71 BURSITIS OF RIGHT HIP, UNSPECIFIED BURSA: ICD-10-CM

## 2017-07-21 DIAGNOSIS — M54.50 LUMBOSACRAL PAIN: ICD-10-CM

## 2017-07-21 DIAGNOSIS — M25.551 RIGHT HIP PAIN: ICD-10-CM

## 2017-07-21 DIAGNOSIS — M54.9 MID BACK PAIN: ICD-10-CM

## 2017-07-21 PROCEDURE — 20553 NJX 1/MLT TRIGGER POINTS 3/>: CPT | Performed by: PHYSICAL MEDICINE & REHABILITATION

## 2017-07-21 PROCEDURE — 99212 OFFICE O/P EST SF 10 MIN: CPT | Mod: 25 | Performed by: PHYSICAL MEDICINE & REHABILITATION

## 2017-07-21 RX ORDER — TRIAMCINOLONE ACETONIDE 40 MG/ML
40 INJECTION, SUSPENSION INTRA-ARTICULAR; INTRAMUSCULAR ONCE
Status: COMPLETED | OUTPATIENT
Start: 2017-07-21 | End: 2017-07-21

## 2017-07-21 RX ADMIN — TRIAMCINOLONE ACETONIDE 40 MG: 40 INJECTION, SUSPENSION INTRA-ARTICULAR; INTRAMUSCULAR at 17:27

## 2017-07-21 NOTE — MR AVS SNAPSHOT
"        Jayashree Anthony   2017 2:30 PM   Office Visit   MRN: [id number]    Department:  [facility]   Dept Phone:  [phone]    Description:  Female : 1960   Provider:  Chase Rodriguez M.D.           Reason for Visit     Follow-Up           Allergies as of 2017     No Known Allergies      You were diagnosed with     Lumbosacral pain   [682474]       Mid back pain   [434392]       Right hip pain   [586740]       Hip arthritis   [665272]       Bursitis of right hip, unspecified bursa   [7802681]       DDD (degenerative disc disease), thoracolumbar   [888624]       Myofascial pain   [913125]         Vital Signs     Blood Pressure Pulse Temperature Height Weight Body Mass Index    112/64 mmHg 76 36.7 °C (98.1 °F) 1.664 m (5' 5.5\") 85.73 kg (189 lb) 30.96 kg/m2    Oxygen Saturation Smoking Status                97% Former Smoker          Basic Information     Date Of Birth Sex Race Ethnicity Preferred Language    1960 Female White Non- English      Your appointments     2017  2:15 PM   Follow Up Visit with Chase Rodriguez M.D.   [facility] PHYSIAT[facility] (--)    [address] [facility] [address] [address] [phone]           You will be receiving a confirmation call a few days before your appointment from our automated call confirmation system.              Problem List              ICD-10-CM Priority Class Noted - Resolved    Hypertension I10   4/15/2013 - Present    Routine health maintenance Z00.00   4/15/2013 - Present    Hypokalemia E87.6   2013 - Present    Chronic back pain greater than 3 months duration M54.9, G89.29   3/23/2015 - Present    Nicotine vapor product user Z78.9   3/28/2016 - Present      Health Maintenance        Date Due Completion Dates    IMM DTaP/Tdap/Td Vaccine (1 - Tdap) 1979 ---    PAP SMEAR 2016    IMM INFLUENZA (1) 2017, 2016    MAMMOGRAM 2018, 2017, 3/23/2015 " (Postponed)    Override on 3/23/2015: Postponed    COLONOSCOPY 3/23/2025 3/23/2015 (Postponed)    Override on 3/23/2015: Postponed            Current Immunizations     Influenza Vaccine Quad Inj (Pf) 9/19/2016, 9/14/2016  1:11 PM      Below and/or attached are the medications your provider expects you to take. Review all of your home medications and newly ordered medications with your provider and/or pharmacist. Follow medication instructions as directed by your provider and/or pharmacist. Please keep your medication list with you and share with your provider. Update the information when medications are discontinued, doses are changed, or new medications (including over-the-counter products) are added; and carry medication information at all times in the event of emergency situations     Allergies:  No Known Allergies          Medications  Valid as of: July 21, 2017 -  2:44 PM    Generic Name Brand Name Tablet Size Instructions for use    Acetaminophen (Tab) TYLENOL 500 MG Take 500-1,000 mg by mouth every 6 hours as needed.        AmLODIPine Besylate (Tab) NORVASC 5 MG TAKE 1 TABLET BY MOUTH EVERY DAY        Hydrocodone-Acetaminophen (Tab) NORCO 5-325 MG Take 1 Tab by mouth every 8 hours as needed.        Ibuprofen (Tab) MOTRIN 200 MG Take 200 mg by mouth every 6 hours as needed.        Lisinopril (Tab) PRINIVIL 20 MG TAKE 1 TABLET BY MOUTH EVERY DAY.        Multiple Vitamins-Minerals   Take  by mouth every day.        .                 Medicines prescribed today were sent to:     [facility]Y #[id number] - [place], [place] - [address] [place] [address]    [address] [place] [address] [place] [address]    Phone: [phone] Fax: [phone]    Open 24 Hours?: No      Medication refill instructions:       If your prescription bottle indicates you have medication refills left, it is not necessary to call your provider’s office. Please contact your pharmacy and they will refill your medication.    If your prescription bottle indicates you do not have  any refills left, you may request refills at any time through one of the following ways: The online [employer] system (except Urgent Care), by calling your provider’s office, or by asking your pharmacy to contact your provider’s office with a refill request. Medication refills are processed only during regular business hours and may not be available until the next business day. Your provider may request additional information or to have a follow-up visit with you prior to refilling your medication.   *Please Note: Medication refills are assigned a new Rx number when refilled electronically. Your pharmacy may indicate that no refills were authorized even though a new prescription for the same medication is available at the pharmacy. Please request the medicine by name with the pharmacy before contacting your provider for a refill.           [employer] Access Code: Activation code not generated  Current [employer] Status: Active          Quit Tobacco Information     Do you want to quit using tobacco?    Quitting tobacco decreases risks of cancer, heart and lung disease, increases life expectancy, improves sense of taste and smell, and increases spending money, among other benefits.    If you are thinking about quitting, we can help.  • [employer] Quit Tobacco Program: 672.688.2004  o Program occurs weekly for four weeks and includes pharmacist consultation on products to support quitting smoking or chewing tobacco. A provider referral is needed for pharmacist consultation.  • Tobacco Users Help Hotline: 6-663-QUIT-NOW (369-9347) or https://[place].quitlogix.org/  o Free, confidential telephone and online coaching for [place] residents. Sessions are designed on a schedule that is convenient for you. Eligible clients receive free nicotine replacement therapy.  • Nationally: www.smokefree.gov  o Information and professional assistance to support both immediate and long-term needs as you become, and remain, a non-smoker. Smokefree.gov  allows you to choose the help that best fits your needs.

## 2017-07-22 NOTE — PROGRESS NOTES
Subjective:      Jayashree Anthony presents with Follow-Up          Subjective: Ms. Anthony returns to the office today for follow-up evaluation of spinal/joints/musculoskeletal pain. The patient notes long history of back pain, notes diagnosed with Scheuermann's disease when she was younger, nonsurgical management.     The patient notes ongoing pain in the lumbosacral region, notes intermittent radiating pain to the right hip/thigh region with neuropathic component, worse with activities, also notes lower limb cramping at night.    She has lower thoracic pain, without overt radicular component.    She notes right hip area pain, most prominent in the lateral aspect, limiting walking tolerance.    She has had prior cervical spine surgery through [facility], symptoms controlled.    She has had prior treatment with medications. She has tried modalities. No bowel/bladder dysfunction noted. No overt lower limb weakness noted. The ongoing pain limits her ability to function, including standing and walking tolerance.       MEDICAL RECORDS REVIEW/DATA REVIEW: Reviewed in epic.    I reviewed medications. Medications per primary care provider    I reviewed  profile 7/14/2017.     I reviewed diagnostic studies:     I reviewed radiographs. Review thoracic spine x-rays 7/2017, showed mild degenerative disc disease. Reviewed right hip x-rays 7/2017, showed mild bilateral hip arthritis. Reviewed MRI lumbar spine 12/2016. Reviewed lumbar spine x-rays 12/2016. Reviewed prior cervical spine imaging.    I reviewed lab studies. Reviewed CMP 12/2016. A1C 12/2016 of 5.2.    I reviewed medical issues. Followed by primary care provider, records reviewed.    I reviewed family history: No neuromuscular disorders noted.    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      PAST MEDICAL HISTORY:   Past Medical History   Diagnosis Date   • Arthritis    • Hypertension    • Varicose vein    • Hypertension 4/15/2013   •  Active smoker 4/15/2013   • Chronic back pain greater than 3 months duration 3/23/2015       PAST SURGICAL HISTORY:    Past Surgical History   Procedure Laterality Date   • Cervical fusion posterior  12/27/2011     Performed by ROBERT SIGALA at [facility]   • Cervical laminectomy posterior  12/27/2011     Performed by ROBERT SIGALA at [facility]   • Other orthopedic surgery  1968     right  arm orif       ALLERGIES:  Review of patient's allergies indicates no known allergies.    MEDICATIONS:    Outpatient Encounter Prescriptions as of 7/21/2017   Medication Sig Dispense Refill   • hydrocodone-acetaminophen (NORCO) 5-325 MG Tab per tablet Take 1 Tab by mouth every 8 hours as needed. 90 Tab 0   • acetaminophen (TYLENOL) 500 MG Tab Take 500-1,000 mg by mouth every 6 hours as needed.     • amlodipine (NORVASC) 5 MG Tab TAKE 1 TABLET BY MOUTH EVERY DAY 90 Tab 0   • lisinopril (PRINIVIL) 20 MG Tab TAKE 1 TABLET BY MOUTH EVERY DAY. 90 Tab 0   • Multiple Vitamins-Minerals (ONE-A-DAY 50 PLUS PO) Take  by mouth every day.     • ibuprofen (MOTRIN) 200 MG TABS Take 200 mg by mouth every 6 hours as needed.       No facility-administered encounter medications on file as of 7/21/2017.       SOCIAL HISTORY:    Social History     Social History   • Marital Status:      Spouse Name: N/A   • Number of Children: N/A   • Years of Education: N/A     Social History Main Topics   • Smoking status: Former Smoker -- 1.00 packs/day for 31 years     Types: Cigarettes     Quit date: 07/14/2015   • Smokeless tobacco: Current User      Comment: electronic cigarette   • Alcohol Use: 0.0 oz/week     0 Standard drinks or equivalent per week      Comment: 3-4  beer q week   • Drug Use: No   • Sexual Activity:     Partners: Male      Comment:      Other Topics Concern   •  Service No   • Blood Transfusions No   • Caffeine Concern No   • Occupational Exposure No   • Hobby Hazards Yes     motorcycle  "riding    • Sleep Concern Yes     pain   • Stress Concern Yes   • Weight Concern Yes   • Special Diet No   • Back Care Yes   • Exercise Yes     tries    • Bike Helmet No   • Seat Belt Yes   • Self-Exams Yes     Social History Narrative       Review of Systems   Constitutional: Negative for fever and chills.   HENT: Negative for hearing loss and tinnitus.    Eyes: Negative for blurred vision and double vision.   Respiratory: Negative for cough and hemoptysis.    Cardiovascular: Negative for chest pain and palpitations.   Gastrointestinal: Negative for nausea and vomiting.   Genitourinary: Negative for urgency and frequency.   Musculoskeletal: Positive for myalgias, back pain and joint pain.   Skin: Negative.    Neurological: Positive for tingling and sensory change.   Endo/Heme/Allergies: Negative.    All other systems reviewed and are negative.        Objective:     /64 mmHg  Pulse 76  Temp(Src) 36.7 °C (98.1 °F)  Ht 1.664 m (5' 5.5\")  Wt 85.73 kg (189 lb)  BMI 30.96 kg/m2  SpO2 97%     Physical Exam  Constitutional: oriented to person, place, and time, appears well-developed and well-nourished.   HEENT: Normocephalic atraumatic, neck supple, no JVD noted, no masses noted, no meningeal signs noted  Lymphadenopathy: no cervical, supraclavicular, or inguinal lymphadenopathy noted  Cardiovascular: Intact distal pulses, including at ankles, no limb swelling noted  Pulmonary: No tachypnea noted, no accessory muscle use noted, no dyspnea noted  Abdominal: Soft, nontender, exhibits no distension, no peritoneal signs, no HSM  Musculoskeletal:   Right hip: exhibits  tenderness most prominent lateral hip over trochanteric bursa, reproducing pain. pain with range of motion testing  Left hip: exhibits only mild tenderness. Minimal pain with range of motion testing  Lumbar back: exhibits decreased range of motion, tenderness and pain. negative straight leg testing, trigger points noted, tender right sacroiliac joint " region, pain noted with quadrant loading and extension to the right, scoliosis noted  Thoracic: Tender with palpation lower thoracic region, pain with range of motion testing, scoliosis noted  Neurological: oriented to person, place, and time. Cranial nerves grossly intact, normal strength. Sensation intact distally. Reflexes 1+ in lower limbs, Gait mildly antalgic, reciprocal, No upper motor neuron signs evident  Skin: Skin is intact. no rashes or lesions noted  Psychiatric: normal mood and affect. speech is normal and behavior is normal. Judgment and thought content normal. Cognition and memory are normal.         Procedure note: Written/informed consent was obtained, risks benefits and alternatives discussed, and all questions were answered. The patient was placed prone on the exam table and 2 trigger points each were identified in the bilateral lumbar paraspinal muscles, and one trigger point each were identified in the bilateral lower thoracic paraspinal muscles. The areas were marked, then sterilely prepared. Then using a 25-gauge needle, trigger point injections were performed with injection of 1 cc of a mixture of 1 cc of Kenalog 40 mg/cc and 5 cc of 1% lidocaine at each site. The patient tolerated the procedure well. There were no complications.      Assessment/Plan:       ASSESSMENT:    1. Lumbosacral pain, myofascial pain, intermittent lumbar radiculitis, foraminal stenosis, listhesis, degenerative disc disease, facet arthropathy, lumbar spondylosis, scoliosis    - I reviewed postprocedure precautions    2. Mid back pain, myofascial pain, degenerative disc disease, H/O Scheuermann disease, scoliosis    - I reviewed postprocedure precautions    3. Right hip pain, sprain strain, bursitis/tendinopathy, mild bilateral hip arthritis    - Reviewed injection therapy with right hip bursa injection, previously authorized, patient to consider    4. History of cervical spine surgery, symptomatically controlled    5.  Comorbid medical issues, with care per primary care provider      DISCUSSION/PLAN:    - I discussed management options. I reviewed symptomatic care    - I reviewed physical therapy. I reviewed home exercise program, with medical precautions    - The patient can consider complementary trials with acupuncture, massage therapy, or TENS unit    - I reviewed medication monitoring. Medications per primary care provider. I reviewed further symptomatic medications. I did not prescribe any medications today.    - I reviewed additional diagnostic options, including further/advanced imaging, including lumbar spine flexion/extension radiograph, electrodiagnostic testing, vascular studies, and further lab screening    - I reviewed additional therapeutic options, including injection/interventional therapy and additional consultative input.    - I reviewed psychosocial interventions    - Return next week for right hip bursa injection or an as-needed basis      Please note that this dictation was created using voice recognition software. I have made every reasonable attempt to correct obvious errors but there may be errors of grammar and content that I may have overlooked prior to finalization of this note.

## 2017-07-27 ENCOUNTER — OFFICE VISIT (OUTPATIENT)
Dept: PHYSICAL MEDICINE AND REHAB | Facility: MEDICAL CENTER | Age: 57
End: 2017-07-27
Payer: COMMERCIAL

## 2017-07-27 VITALS
TEMPERATURE: 97 F | OXYGEN SATURATION: 97 % | SYSTOLIC BLOOD PRESSURE: 116 MMHG | WEIGHT: 183 LBS | HEIGHT: 65 IN | BODY MASS INDEX: 30.49 KG/M2 | HEART RATE: 77 BPM | DIASTOLIC BLOOD PRESSURE: 78 MMHG

## 2017-07-27 DIAGNOSIS — M70.71 BURSITIS OF RIGHT HIP, UNSPECIFIED BURSA: ICD-10-CM

## 2017-07-27 PROCEDURE — 20610 DRAIN/INJ JOINT/BURSA W/O US: CPT | Performed by: PHYSICAL MEDICINE & REHABILITATION

## 2017-07-27 RX ORDER — TRIAMCINOLONE ACETONIDE 40 MG/ML
40 INJECTION, SUSPENSION INTRA-ARTICULAR; INTRAMUSCULAR ONCE
Status: COMPLETED | OUTPATIENT
Start: 2017-07-27 | End: 2017-07-27

## 2017-07-27 RX ADMIN — TRIAMCINOLONE ACETONIDE 40 MG: 40 INJECTION, SUSPENSION INTRA-ARTICULAR; INTRAMUSCULAR at 17:06

## 2017-07-27 NOTE — MR AVS SNAPSHOT
Jayashree Anthony   2017 2:15 PM   Office Visit   MRN: [id number]    Department:  [facility]   Dept Phone:  [phone]    Description:  Female : 1960   Provider:  Chase Rodriguez M.D.           Reason for Visit     Follow-Up           Allergies as of 2017     No Known Allergies      Vital Signs     Smoking Status                   Former Smoker           Basic Information     Date Of Birth Sex Race Ethnicity Preferred Language    1960 Female White Non- English      Problem List              ICD-10-CM Priority Class Noted - Resolved    Hypertension I10   4/15/2013 - Present    Routine health maintenance Z00.00   4/15/2013 - Present    Hypokalemia E87.6   2013 - Present    Chronic back pain greater than 3 months duration M54.9, G89.29   3/23/2015 - Present    Nicotine vapor product user Z78.9   3/28/2016 - Present      Health Maintenance        Date Due Completion Dates    IMM DTaP/Tdap/Td Vaccine (1 - Tdap) 1979 ---    PAP SMEAR 2016    IMM INFLUENZA (1) 2017, 2016    MAMMOGRAM 2018, 2017, 3/23/2015 (Postponed)    Override on 3/23/2015: Postponed    COLONOSCOPY 3/23/2025 3/23/2015 (Postponed)    Override on 3/23/2015: Postponed            Current Immunizations     Influenza Vaccine Quad Inj (Pf) 2016, 2016  1:11 PM      Below and/or attached are the medications your provider expects you to take. Review all of your home medications and newly ordered medications with your provider and/or pharmacist. Follow medication instructions as directed by your provider and/or pharmacist. Please keep your medication list with you and share with your provider. Update the information when medications are discontinued, doses are changed, or new medications (including over-the-counter products) are added; and carry medication information at all times in the event of emergency situations     Allergies:  No Known  Allergies          Medications  Valid as of: July 27, 2017 -  2:28 PM    Generic Name Brand Name Tablet Size Instructions for use    Acetaminophen (Tab) TYLENOL 500 MG Take 500-1,000 mg by mouth every 6 hours as needed.        AmLODIPine Besylate (Tab) NORVASC 5 MG TAKE 1 TABLET BY MOUTH EVERY DAY        Hydrocodone-Acetaminophen (Tab) NORCO 5-325 MG Take 1 Tab by mouth every 8 hours as needed.        Ibuprofen (Tab) MOTRIN 200 MG Take 200 mg by mouth every 6 hours as needed.        Lisinopril (Tab) PRINIVIL 20 MG TAKE 1 TABLET BY MOUTH EVERY DAY.        Multiple Vitamins-Minerals   Take  by mouth every day.        .                 Medicines prescribed today were sent to:     [id number] - [place], [place] - [address]    [address] [place] [address]    Phone: [phone] Fax: [phone]    Open 24 Hours?: No      Medication refill instructions:       If your prescription bottle indicates you have medication refills left, it is not necessary to call your provider’s office. Please contact your pharmacy and they will refill your medication.    If your prescription bottle indicates you do not have any refills left, you may request refills at any time through one of the following ways: The online [employer] system (except Urgent Care), by calling your provider’s office, or by asking your pharmacy to contact your provider’s office with a refill request. Medication refills are processed only during regular business hours and may not be available until the next business day. Your provider may request additional information or to have a follow-up visit with you prior to refilling your medication.   *Please Note: Medication refills are assigned a new Rx number when refilled electronically. Your pharmacy may indicate that no refills were authorized even though a new prescription for the same medication is available at the pharmacy. Please request the medicine by name with the pharmacy before contacting your  provider for a refill.           MyChart Access Code: Activation code not generated  Current MyChart Status: Active          Quit Tobacco Information     Do you want to quit using tobacco?    Quitting tobacco decreases risks of cancer, heart and lung disease, increases life expectancy, improves sense of taste and smell, and increases spending money, among other benefits.    If you are thinking about quitting, we can help.  • [facility] Quit Tobacco Program: [phone]  o Program occurs weekly for four weeks and includes pharmacist consultation on products to support quitting smoking or chewing tobacco. A provider referral is needed for pharmacist consultation.  • Tobacco Users Help Hotline: 6-680-QUIT-NOW (591-0309) or https://[place].quitlogix.org/  o Free, confidential telephone and online coaching for [place] residents. Sessions are designed on a schedule that is convenient for you. Eligible clients receive free nicotine replacement therapy.  • Nationally: www.smokefree.gov  o Information and professional assistance to support both immediate and long-term needs as you become, and remain, a non-smoker. Smokefree.gov allows you to choose the help that best fits your needs.

## 2017-07-28 NOTE — PROGRESS NOTES
Procedure note:    Interval history: The patient notes a benefit with the thoracolumbar trigger point injections performed the last visit, symptoms improved, has been able to improve her function. She notes ongoing right lateral hip pain, worsened activities, limiting standing and walking tolerance. She had been referred to physical therapy.    Procedure:    Date of service: 7/27/2017    Diagnosis: Right hip bursitis    Procedure: Right greater trochanteric bursa injection    Note: Written/informed consent was obtained, risks benefits and alternatives discussed, and all questions were answered. The patient was placed in the lateral decubitus position and the area over the lateral aspect of the right hip was marked and sterilely prepared. Following local skin anesthesia using a 22 gauge 3-1/2 inch needle, right hip greater trochanteric bursa injection was performed with injection of 5 cc of a mixture of 1 cc of Kenalog 40 mg/cc and 4 cc of 1% lidocaine. The patient tolerated the procedure well. There were no complications. Postinjection instructions were reviewed with the patient.

## 2017-07-31 RX ORDER — LISINOPRIL 20 MG/1
TABLET ORAL
Qty: 90 TAB | Refills: 0 | Status: SHIPPED | OUTPATIENT
Start: 2017-07-31 | End: 2017-10-27 | Stop reason: SDUPTHER

## 2017-07-31 RX ORDER — AMLODIPINE BESYLATE 5 MG/1
TABLET ORAL
Qty: 90 TAB | Refills: 0 | Status: SHIPPED | OUTPATIENT
Start: 2017-07-31 | End: 2017-10-27 | Stop reason: SDUPTHER

## 2017-07-31 NOTE — TELEPHONE ENCOUNTER
Was the patient seen in the last year in this department? Yes     Does patient have an active prescription for medications requested? No     Received Request Via: Pharmacy      Pt met protocol?: Yes pt last ov 4/2017   BP Readings from Last 1 Encounters:   07/27/17 116/78

## 2017-08-11 ENCOUNTER — HOSPITAL ENCOUNTER (OUTPATIENT)
Dept: PHYSICAL THERAPY | Facility: REHABILITATION | Age: 57
End: 2017-08-11
Attending: PHYSICAL MEDICINE & REHABILITATION
Payer: COMMERCIAL

## 2017-08-11 PROCEDURE — 97161 PT EVAL LOW COMPLEX 20 MIN: CPT

## 2017-08-11 PROCEDURE — 97110 THERAPEUTIC EXERCISES: CPT

## 2017-08-14 ENCOUNTER — APPOINTMENT (OUTPATIENT)
Dept: PHYSICAL THERAPY | Facility: REHABILITATION | Age: 57
End: 2017-08-14
Payer: COMMERCIAL

## 2017-08-21 ENCOUNTER — HOSPITAL ENCOUNTER (OUTPATIENT)
Dept: PHYSICAL THERAPY | Facility: REHABILITATION | Age: 57
End: 2017-08-21
Attending: PHYSICAL MEDICINE & REHABILITATION
Payer: COMMERCIAL

## 2017-08-21 PROCEDURE — 97110 THERAPEUTIC EXERCISES: CPT

## 2017-08-23 ENCOUNTER — HOSPITAL ENCOUNTER (OUTPATIENT)
Dept: PHYSICAL THERAPY | Facility: REHABILITATION | Age: 57
End: 2017-08-23
Attending: PHYSICAL MEDICINE & REHABILITATION
Payer: COMMERCIAL

## 2017-08-23 PROCEDURE — 97110 THERAPEUTIC EXERCISES: CPT

## 2017-08-28 DIAGNOSIS — Z79.891 CHRONIC USE OF OPIATE DRUGS THERAPEUTIC PURPOSES: ICD-10-CM

## 2017-08-28 DIAGNOSIS — M54.9 CHRONIC BACK PAIN GREATER THAN 3 MONTHS DURATION: ICD-10-CM

## 2017-08-28 DIAGNOSIS — G89.29 CHRONIC BACK PAIN GREATER THAN 3 MONTHS DURATION: ICD-10-CM

## 2017-08-28 DIAGNOSIS — M76.31 IT BAND SYNDROME, RIGHT: ICD-10-CM

## 2017-08-28 RX ORDER — HYDROCODONE BITARTRATE AND ACETAMINOPHEN 5; 325 MG/1; MG/1
1 TABLET ORAL EVERY 8 HOURS PRN
Qty: 90 TAB | Refills: 0 | OUTPATIENT
Start: 2017-08-28

## 2017-08-28 NOTE — TELEPHONE ENCOUNTER
Last Date Filled:7/18/17    Was the patient seen in the last year in this department? Yes       Does patient have an active prescription for medications requested? No     Received Request Via: Patient     history reviewed prior to writing prescription for controlled substance II, III, or IV per [place] bill .

## 2017-08-30 ENCOUNTER — HOSPITAL ENCOUNTER (OUTPATIENT)
Dept: PHYSICAL THERAPY | Facility: REHABILITATION | Age: 57
End: 2017-08-30
Attending: PHYSICAL MEDICINE & REHABILITATION
Payer: COMMERCIAL

## 2017-08-30 PROCEDURE — 97110 THERAPEUTIC EXERCISES: CPT

## 2017-09-21 ENCOUNTER — OFFICE VISIT (OUTPATIENT)
Dept: MEDICAL GROUP | Facility: PHYSICIAN GROUP | Age: 57
End: 2017-09-21
Payer: COMMERCIAL

## 2017-09-21 VITALS
TEMPERATURE: 97.8 F | HEART RATE: 66 BPM | RESPIRATION RATE: 16 BRPM | HEIGHT: 66 IN | WEIGHT: 192 LBS | SYSTOLIC BLOOD PRESSURE: 114 MMHG | OXYGEN SATURATION: 96 % | BODY MASS INDEX: 30.86 KG/M2 | DIASTOLIC BLOOD PRESSURE: 72 MMHG

## 2017-09-21 DIAGNOSIS — M76.31 IT BAND SYNDROME, RIGHT: ICD-10-CM

## 2017-09-21 DIAGNOSIS — Z79.891 CHRONIC USE OF OPIATE DRUGS THERAPEUTIC PURPOSES: ICD-10-CM

## 2017-09-21 DIAGNOSIS — G89.29 CHRONIC BACK PAIN GREATER THAN 3 MONTHS DURATION: ICD-10-CM

## 2017-09-21 DIAGNOSIS — M54.9 CHRONIC BACK PAIN GREATER THAN 3 MONTHS DURATION: ICD-10-CM

## 2017-09-21 DIAGNOSIS — Z76.89 ENCOUNTER TO ESTABLISH CARE WITH NEW DOCTOR: ICD-10-CM

## 2017-09-21 PROCEDURE — 99214 OFFICE O/P EST MOD 30 MIN: CPT | Performed by: NURSE PRACTITIONER

## 2017-09-21 RX ORDER — HYDROCODONE BITARTRATE AND ACETAMINOPHEN 5; 325 MG/1; MG/1
1 TABLET ORAL EVERY 8 HOURS PRN
Qty: 90 TAB | Refills: 0 | Status: SHIPPED | OUTPATIENT
Start: 2017-09-21 | End: 2017-10-23 | Stop reason: SDUPTHER

## 2017-09-22 NOTE — ASSESSMENT & PLAN NOTE
Chronic pain has been stable on current meds.  She would like a refill.   reviewed.    Overall impression that this patient is benefiting from opioid therapy and that the benefits outweigh the risks of continued use: yes     - Controlled Substance Use Agreement current or updated today   - Urine drug screen current or ordered today   - Rx refill prescription for 1 month, No early refills. See orders   - Referral to pain management: no

## 2017-09-22 NOTE — PROGRESS NOTES
Chief Complaint   Patient presents with   • Establish Care       HISTORY OF PRESENT ILLNESS: Patient is a 57 y.o. female new patient who presents today to discuss the following issues:    Encounter to establish care with new doctor  Is here to establish with a new primary care provider.  Was previously seen by Linette Aggarwal.    Chronic back pain greater than 3 months duration  Chronic pain has been stable on current meds.  She would like a refill.   reviewed.    Overall impression that this patient is benefiting from opioid therapy and that the benefits outweigh the risks of continued use: yes     - Controlled Substance Use Agreement current or updated today   - Urine drug screen current or ordered today   - Rx refill prescription for 1 month, No early refills. See orders   - Referral to pain management: no        Patient Active Problem List    Diagnosis Date Noted   • Encounter to establish care with new doctor 09/21/2017   • Nicotine vapor product user 03/28/2016   • Chronic back pain greater than 3 months duration 03/23/2015   • Hypokalemia 05/31/2013   • Hypertension 04/15/2013   • Routine health maintenance 04/15/2013       Allergies:Review of patient's allergies indicates no known allergies.    Current Outpatient Prescriptions   Medication Sig Dispense Refill   • hydrocodone-acetaminophen (NORCO) 5-325 MG Tab per tablet Take 1 Tab by mouth every 8 hours as needed. 90 Tab 0   • amlodipine (NORVASC) 5 MG Tab TAKE 1 TABLET BY MOUTH EVERY DAY 90 Tab 0   • lisinopril (PRINIVIL) 20 MG Tab TAKE 1 TABLET BY MOUTH EVERY DAY. 90 Tab 0   • acetaminophen (TYLENOL) 500 MG Tab Take 500-1,000 mg by mouth every 6 hours as needed.     • ibuprofen (MOTRIN) 200 MG TABS Take 200 mg by mouth every 6 hours as needed.     • Multiple Vitamins-Minerals (ONE-A-DAY 50 PLUS PO) Take  by mouth every day.       No current facility-administered medications for this visit.        Social History   Substance Use Topics   • Smoking  "status: Former Smoker     Packs/day: 1.00     Years: 31.00     Types: Cigarettes     Quit date: 2015   • Smokeless tobacco: Never Used      Comment: electronic cigarette   • Alcohol use 0.0 oz/week      Comment: 3-4  beer q week       Family Status   Relation Status   • Mother    • Father    • Sister Alive   • Neg Hx      Family History   Problem Relation Age of Onset   • Hyperlipidemia Mother    • Heart Disease Mother    • Diabetes Mother    • Thyroid Mother    • Hyperlipidemia Father    • Cancer Father      leukemia due to chemical exposure   • Thyroid Sister    • Stroke Neg Hx        Review of Systems:   Constitutional: Negative for fever, chills, weight loss and malaise/fatigue.   HENT: Negative for ear pain, nosebleeds, congestion, sore throat and neck pain.    Eyes: Negative for blurred vision.   Respiratory: Negative for cough, sputum production, shortness of breath and wheezing.    Cardiovascular: Negative for chest pain, palpitations, orthopnea and leg swelling.   Gastrointestinal: Negative for heartburn, nausea, vomiting and abdominal pain.   Genitourinary: Negative for dysuria, urgency and frequency.   Musculoskeletal: Negative for myalgias, and joint pain.  Positive for chronic low back pain.  Skin: Negative for rash and itching.   Neurological: Negative for dizziness, tingling, tremors, sensory change, focal weakness and headaches.   Endo/Heme/Allergies: Does not bruise/bleed easily.   Psychiatric/Behavioral: Negative for depression, suicidal ideas and memory loss.  The patient is not nervous/anxious and does not have insomnia.    All other systems reviewed and are negative except as in HPI.    Exam:  Blood pressure 114/72, pulse 66, temperature 36.6 °C (97.8 °F), resp. rate 16, height 1.664 m (5' 5.5\"), weight 87.1 kg (192 lb), SpO2 96 %.  General:  Well nourished, well developed female in NAD  Head: Grossly normal.  Neck: Supple without JVD or bruit. Thyroid is not " enlarged.  Pulmonary: Clear to ausculation. Normal effort. No rales, ronchi, or wheezing.  Cardiovascular: Regular rate and rhythm without murmur.   Extremities: No clubbing, cyanosis, or edema.  Skin: Intact with no obvious rashes or lesions.  Neuro: Grossly intact.  Psych: Alert and oriented x 3.  Mood and affect appropriate.    Medical decision-making and discussion: Jayashree is here to establish with a new primary care provider.  We reviewed her past medical history and discussed her current medications.  She was given a prescription for Norco. She will sign a records release for her previous provider, she will [facility], and she will plan to follow-up here as needed.         Assessment/Plan:  1. Chronic back pain greater than 3 months duration  hydrocodone-acetaminophen (NORCO) 5-325 MG Tab per tablet   2. Chronic use of opiate drugs therapeutic purposes  hydrocodone-acetaminophen (NORCO) 5-325 MG Tab per tablet   3. IT band syndrome, right  hydrocodone-acetaminophen (NORCO) 5-325 MG Tab per tablet   4. BMI 31.0-31.9,adult  Patient identified as having weight management issue.  Appropriate orders and counseling given.   5. Encounter to establish care with new doctor         Return if symptoms worsen or fail to improve.    Please note that this dictation was created using voice recognition software. I have made every reasonable attempt to correct obvious errors, but I expect that there are errors of grammar and possibly content that I did not discover before finalizing the note.

## 2017-10-23 DIAGNOSIS — M54.9 CHRONIC BACK PAIN GREATER THAN 3 MONTHS DURATION: ICD-10-CM

## 2017-10-23 DIAGNOSIS — Z79.891 CHRONIC USE OF OPIATE DRUGS THERAPEUTIC PURPOSES: ICD-10-CM

## 2017-10-23 DIAGNOSIS — G89.29 CHRONIC BACK PAIN GREATER THAN 3 MONTHS DURATION: ICD-10-CM

## 2017-10-23 DIAGNOSIS — M76.31 IT BAND SYNDROME, RIGHT: ICD-10-CM

## 2017-10-23 NOTE — TELEPHONE ENCOUNTER
From: Jayashree Anthony  Sent: 10/23/2017 7:43 AM PDT  Subject: Medication Renewal Request    Jayashree Anthony would like a refill of the following medications:  hydrocodone-acetaminophen (NORCO) 5-325 MG Tab per tablet [Rajiv Seals, A.P.N.]    Preferred pharmacy: [id number] - [place], [place] - [address].    Comment:  If you could contact me when this prescription is ready at [phone]. thanks so much Jayashree Anthony

## 2017-10-24 RX ORDER — HYDROCODONE BITARTRATE AND ACETAMINOPHEN 5; 325 MG/1; MG/1
1 TABLET ORAL EVERY 8 HOURS PRN
Qty: 90 TAB | Refills: 0 | Status: SHIPPED | OUTPATIENT
Start: 2017-10-24 | End: 2017-12-01 | Stop reason: SDUPTHER

## 2017-10-31 RX ORDER — LISINOPRIL 20 MG/1
TABLET ORAL
Qty: 90 TAB | Refills: 1 | Status: SHIPPED | OUTPATIENT
Start: 2017-10-31 | End: 2018-04-26 | Stop reason: SDUPTHER

## 2017-10-31 RX ORDER — AMLODIPINE BESYLATE 5 MG/1
TABLET ORAL
Qty: 90 TAB | Refills: 0 | Status: SHIPPED | OUTPATIENT
Start: 2017-10-31 | End: 2018-01-25 | Stop reason: SDUPTHER

## 2017-10-31 NOTE — TELEPHONE ENCOUNTER
Refill X 6 months, sent to pharmacy.Pt. Seen in the last 6 months per protocol.   Lab Results   Component Value Date/Time    SODIUM 136 12/06/2016 06:24 AM    POTASSIUM 4.1 12/06/2016 06:24 AM    CHLORIDE 104 12/06/2016 06:24 AM    CO2 25 12/06/2016 06:24 AM    GLUCOSE 78 12/06/2016 06:24 AM    BUN 26 (H) 12/06/2016 06:24 AM    CREATININE 1.38 12/06/2016 06:24 AM

## 2017-11-08 DIAGNOSIS — M79.18 MYOFASCIAL PAIN: ICD-10-CM

## 2017-11-08 NOTE — PROGRESS NOTES
The patient notes recent flare of lumbosacral pain, scheduled for follow-up appointment, had benefit with prior trigger point injections, request authorization, review care issues at office appointment.

## 2017-11-17 ENCOUNTER — OFFICE VISIT (OUTPATIENT)
Dept: PHYSICAL MEDICINE AND REHAB | Facility: MEDICAL CENTER | Age: 57
End: 2017-11-17
Payer: COMMERCIAL

## 2017-11-17 VITALS
BODY MASS INDEX: 31.18 KG/M2 | TEMPERATURE: 97 F | DIASTOLIC BLOOD PRESSURE: 80 MMHG | HEIGHT: 66 IN | OXYGEN SATURATION: 96 % | SYSTOLIC BLOOD PRESSURE: 116 MMHG | WEIGHT: 194 LBS | HEART RATE: 61 BPM

## 2017-11-17 DIAGNOSIS — M79.18 MYOFASCIAL PAIN: ICD-10-CM

## 2017-11-17 DIAGNOSIS — M54.50 LUMBOSACRAL PAIN: ICD-10-CM

## 2017-11-17 DIAGNOSIS — M25.50 ARTHRALGIA, UNSPECIFIED JOINT: ICD-10-CM

## 2017-11-17 DIAGNOSIS — M54.9 SPINAL PAIN: ICD-10-CM

## 2017-11-17 DIAGNOSIS — M54.9 MID BACK PAIN: ICD-10-CM

## 2017-11-17 PROCEDURE — 99212 OFFICE O/P EST SF 10 MIN: CPT | Mod: 25 | Performed by: PHYSICAL MEDICINE & REHABILITATION

## 2017-11-17 PROCEDURE — 20553 NJX 1/MLT TRIGGER POINTS 3/>: CPT | Performed by: PHYSICAL MEDICINE & REHABILITATION

## 2017-11-17 RX ORDER — TRIAMCINOLONE ACETONIDE 40 MG/ML
40 INJECTION, SUSPENSION INTRA-ARTICULAR; INTRAMUSCULAR ONCE
Status: COMPLETED | OUTPATIENT
Start: 2017-11-17 | End: 2017-11-17

## 2017-11-17 RX ADMIN — TRIAMCINOLONE ACETONIDE 40 MG: 40 INJECTION, SUSPENSION INTRA-ARTICULAR; INTRAMUSCULAR at 17:58

## 2017-11-18 NOTE — PROGRESS NOTES
Subjective:      Jayashree Anthony presents with Follow-Up        Subjective: Ms. Anthony returns to the office today for follow-up evaluation of spinal/joints/musculoskeletal pain. The patient notes long history of back pain, notes diagnosed with Scheuermann's disease.    The patient notes benefit with the thoracolumbar trigger point injections performed in 7/2017. The patient notes flare of pain recently, possibly activity associated.    The patient now notes pain persisting in the lower thoracic region, constant without overt radicular component, as well as the lumbosacral region, without overt radicular component. The pain is limiting her ability to function, including standing and walking tolerance.    She notes intermittent right hip area pain, relatively controlled, note prior hip bursa injection    She has had prior cervical spine surgery through [facility], controlled.    She has had prior treatment with medications. She has tried modalities. No bowel/bladder dysfunction noted. No overt lower limb weakness noted. The ongoing pain limits her ability to function, including standing and walking tolerance.       MEDICAL RECORDS REVIEW/DATA REVIEW: Reviewed in epic.    I reviewed medications. Medications per primary care provider    I reviewed diagnostic studies:     I reviewed radiographs. Reviewed MRI lumbar spine 12/2016. Reviewed lumbar spine x-rays 12/2016. Reviewed thoracic spine x-rays 7/2017, showed mild degenerative disc disease. Reviewed right hip x-rays 7/2017, showed mild bilateral hip arthritis. Reviewed cervical spine x-rays 1/2013.    I reviewed lab studies. Reviewed CMP 12/2016. A1C 12/2016 of 5.2.    I reviewed medical issues. Followed by primary care provider, records reviewed.    I reviewed family history: No neuromuscular disorders noted.    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[employer]      PAST MEDICAL HISTORY:   Past Medical History:   Diagnosis Date   • Active  smoker 4/15/2013   • Arthritis    • Chronic back pain greater than 3 months duration 3/23/2015   • Hypertension    • Hypertension 4/15/2013   • Varicose vein        PAST SURGICAL HISTORY:    Past Surgical History:   Procedure Laterality Date   • CERVICAL FUSION POSTERIOR  12/27/2011    Performed by ROBERT SIGALA at SURGERY [facility]   • CERVICAL LAMINECTOMY POSTERIOR  12/27/2011    Performed by ROBERT SIGALA at SURGERY [facility]   • OTHER ORTHOPEDIC SURGERY  1968    right  arm orif       ALLERGIES:  Review of patient's allergies indicates no known allergies.    MEDICATIONS:    Outpatient Encounter Prescriptions as of 11/17/2017   Medication Sig Dispense Refill   • lisinopril (PRINIVIL) 20 MG Tab TAKE 1 TABLET BY MOUTH EVERY DAY. 90 Tab 1   • amlodipine (NORVASC) 5 MG Tab TAKE 1 TABLET BY MOUTH EVERY DAY 90 Tab 0   • hydrocodone-acetaminophen (NORCO) 5-325 MG Tab per tablet Take 1 Tab by mouth every 8 hours as needed. 90 Tab 0   • acetaminophen (TYLENOL) 500 MG Tab Take 500-1,000 mg by mouth every 6 hours as needed.     • Multiple Vitamins-Minerals (ONE-A-DAY 50 PLUS PO) Take  by mouth every day.     • ibuprofen (MOTRIN) 200 MG TABS Take 200 mg by mouth every 6 hours as needed.       No facility-administered encounter medications on file as of 11/17/2017.        SOCIAL HISTORY:    Social History     Social History   • Marital status:      Spouse name: N/A   • Number of children: N/A   • Years of education: N/A     Social History Main Topics   • Smoking status: Former Smoker     Packs/day: 1.00     Years: 31.00     Types: Cigarettes     Quit date: 7/14/2015   • Smokeless tobacco: Never Used      Comment: electronic cigarette   • Alcohol use 0.0 oz/week      Comment: 3-4  beer q week   • Drug use: No   • Sexual activity: Yes     Partners: Male      Comment:      Other Topics Concern   •  Service No   • Blood Transfusions No   • Caffeine Concern No   • Occupational Exposure No   •  "Hobby Hazards Yes     motorcycle riding    • Sleep Concern Yes     pain   • Stress Concern Yes   • Weight Concern Yes   • Special Diet No   • Back Care Yes   • Exercise Yes     tries    • Bike Helmet No   • Seat Belt Yes   • Self-Exams Yes     Social History Narrative   • No narrative on file       Review of Systems   Constitutional: Negative for fever and chills.   HENT: Negative for hearing loss and tinnitus.    Eyes: Negative for blurred vision and double vision.   Respiratory: Negative for cough and hemoptysis.    Cardiovascular: Negative for chest pain and palpitations.   Gastrointestinal: Negative for nausea and vomiting.   Genitourinary: Negative for urgency and frequency.   Musculoskeletal: Positive for myalgias, back pain and joint pain.   Skin: Negative.    Neurological: Positive for tingling and sensory change.   Endo/Heme/Allergies: Negative.    All other systems reviewed and are negative.        Objective:     /80   Pulse 61   Temp 36.1 °C (97 °F)   Ht 1.664 m (5' 5.5\")   Wt 88 kg (194 lb)   SpO2 96%   BMI 31.79 kg/m²      Physical Exam  Constitutional: oriented to person, place, and time, appears well-developed and well-nourished.   HEENT: Normocephalic atraumatic, neck supple, no JVD noted, no masses noted, no meningeal signs noted  Lymphadenopathy: no cervical, supraclavicular, or inguinal lymphadenopathy noted  Cardiovascular: Intact distal pulses, including at ankles, no limb swelling noted  Pulmonary: No tachypnea noted, no accessory muscle use noted, no dyspnea noted  Abdominal: Soft, nontender, exhibits no distension, no peritoneal signs, no HSM  Musculoskeletal:   Right hip: exhibits  tenderness most prominent lateral hip over trochanteric bursa, reproducing pain. pain with range of motion testing  Left hip: exhibits only mild tenderness. Minimal pain with range of motion testing  Lumbar back: exhibits decreased range of motion, tenderness and pain. negative straight leg testing, " trigger points noted, tender right sacroiliac joint region, pain noted with quadrant loading and extension to the right, scoliosis noted  Thoracic: Tender with palpation lower thoracic region, pain with range of motion testing, scoliosis noted  Neurological: oriented to person, place, and time. Cranial nerves grossly intact, normal strength. Sensation intact distally. Reflexes 1+ in lower limbs, Gait mildly antalgic, reciprocal, No upper motor neuron signs evident  Skin: Skin is intact. no rashes or lesions noted  Psychiatric: normal mood and affect. speech is normal and behavior is normal. Judgment and thought content normal. Cognition and memory are normal.         Procedure note: Written/informed consent was obtained, risks benefits and alternatives discussed, and all questions were answered. The patient was placed prone on the exam table and 2 trigger points each were identified in the bilateral lumbar paraspinal muscles, and one trigger point each were identified in the bilateral lower thoracic paraspinal muscles. The areas were marked, then sterilely prepared. Then using a 25-gauge needle, trigger point injections were performed with injection of 1 cc of a mixture of 1 cc of Kenalog 40 mg/cc, 2.5 cc of 1% lidocaine, and 2.5 cc of 0.9% preservative-free normal saline was injected at each site. The patient tolerated the procedure well. There were no complications.      Assessment/Plan:       ASSESSMENT:    1. Lumbosacral pain, myofascial pain, intermittent lumbar radiculitis, foraminal stenosis, listhesis, degenerative disc disease, facet arthropathy, lumbar spondylosis, scoliosis    - I reviewed postprocedure precautions    2. Mid back pain, myofascial pain, degenerative disc disease, H/O Scheuermann disease, scoliosis    - I reviewed postprocedure precautions    3. Right hip pain, sprain strain, bursitis/tendinopathy, mild bilateral hip arthritis, relatively controlled    4. History of cervical spine surgery,  symptomatically controlled    5. Comorbid medical issues, with care per primary care provider      DISCUSSION/PLAN:    - I discussed management options. I reviewed symptomatic care    - I reviewed physical therapy. I reviewed home exercise program, with medical precautions    - [facility] authorization request for further trigger point injections to treat the myofascial component to the ongoing pain    - The patient can consider complementary trials with acupuncture, massage therapy, or TENS unit    - I reviewed medication monitoring. Medications per primary care provider. I reviewed further symptomatic medications. I did not prescribe any medications today.    - I reviewed additional diagnostic options, including further/advanced imaging, including lumbar spine flexion/extension radiograph, electrodiagnostic testing, vascular studies, and further lab screening    - I reviewed additional therapeutic options, including injection/interventional therapy and additional consultative input.    - I reviewed psychosocial interventions    - Return in 6 weeks or an as-needed basis      Please note that this dictation was created using voice recognition software. I have made every reasonable attempt to correct obvious errors but there may be errors of grammar and content that I may have overlooked prior to finalization of this note.

## 2017-11-29 ENCOUNTER — TELEPHONE (OUTPATIENT)
Dept: PHYSICAL MEDICINE AND REHAB | Facility: MEDICAL CENTER | Age: 57
End: 2017-11-29

## 2017-12-01 DIAGNOSIS — M76.31 IT BAND SYNDROME, RIGHT: ICD-10-CM

## 2017-12-01 DIAGNOSIS — Z79.891 CHRONIC USE OF OPIATE DRUGS THERAPEUTIC PURPOSES: ICD-10-CM

## 2017-12-01 DIAGNOSIS — M54.9 CHRONIC BACK PAIN GREATER THAN 3 MONTHS DURATION: ICD-10-CM

## 2017-12-01 DIAGNOSIS — G89.29 CHRONIC BACK PAIN GREATER THAN 3 MONTHS DURATION: ICD-10-CM

## 2017-12-01 NOTE — TELEPHONE ENCOUNTER
From: Jayashree Anthony  Sent: 12/1/2017 9:51 AM PST  Subject: Medication Renewal Request    Jayashree Anthony would like a refill of the following medications:     hydrocodone-acetaminophen (NORCO) 5-325 MG Tab per tablet [Rajiv Seals, A.P.N.]    Preferred pharmacy: [facility] #[id number] - [place], [place] - [address].    

## 2017-12-04 RX ORDER — HYDROCODONE BITARTRATE AND ACETAMINOPHEN 5; 325 MG/1; MG/1
1 TABLET ORAL EVERY 8 HOURS PRN
Qty: 90 TAB | Refills: 0 | Status: SHIPPED | OUTPATIENT
Start: 2017-12-04 | End: 2018-01-18 | Stop reason: SDUPTHER

## 2018-01-05 DIAGNOSIS — Z79.891 CHRONIC USE OF OPIATE DRUGS THERAPEUTIC PURPOSES: ICD-10-CM

## 2018-01-05 DIAGNOSIS — M54.9 CHRONIC BACK PAIN GREATER THAN 3 MONTHS DURATION: ICD-10-CM

## 2018-01-05 DIAGNOSIS — G89.29 CHRONIC BACK PAIN GREATER THAN 3 MONTHS DURATION: ICD-10-CM

## 2018-01-05 DIAGNOSIS — M76.31 IT BAND SYNDROME, RIGHT: ICD-10-CM

## 2018-01-05 RX ORDER — HYDROCODONE BITARTRATE AND ACETAMINOPHEN 5; 325 MG/1; MG/1
1 TABLET ORAL EVERY 8 HOURS PRN
Qty: 90 TAB | Refills: 0 | OUTPATIENT
Start: 2018-01-05

## 2018-01-05 NOTE — TELEPHONE ENCOUNTER
From: Jayashree Anthony  Sent: 1/5/2018 1:36 PM PST  Subject: Medication Renewal Request    Jayashree Anthony would like a refill of the following medications:     hydrocodone-acetaminophen (NORCO) 5-325 MG Tab per tablet [Rajiv Mc, A.P.N.]    Preferred pharmacy: [facility]Y #[id number] - [place], [place] - [address].    

## 2018-01-18 ENCOUNTER — OFFICE VISIT (OUTPATIENT)
Dept: MEDICAL GROUP | Facility: PHYSICIAN GROUP | Age: 58
End: 2018-01-18
Payer: COMMERCIAL

## 2018-01-18 VITALS
SYSTOLIC BLOOD PRESSURE: 114 MMHG | HEIGHT: 66 IN | TEMPERATURE: 98.4 F | OXYGEN SATURATION: 95 % | WEIGHT: 190 LBS | DIASTOLIC BLOOD PRESSURE: 82 MMHG | HEART RATE: 68 BPM | RESPIRATION RATE: 16 BRPM | BODY MASS INDEX: 30.53 KG/M2

## 2018-01-18 DIAGNOSIS — F34.1 DYSTHYMIA: ICD-10-CM

## 2018-01-18 DIAGNOSIS — M76.31 IT BAND SYNDROME, RIGHT: ICD-10-CM

## 2018-01-18 DIAGNOSIS — Z79.891 CHRONIC USE OF OPIATE DRUGS THERAPEUTIC PURPOSES: ICD-10-CM

## 2018-01-18 DIAGNOSIS — M54.9 CHRONIC BACK PAIN GREATER THAN 3 MONTHS DURATION: ICD-10-CM

## 2018-01-18 DIAGNOSIS — G89.29 CHRONIC BACK PAIN GREATER THAN 3 MONTHS DURATION: ICD-10-CM

## 2018-01-18 PROBLEM — Z76.89 ENCOUNTER TO ESTABLISH CARE WITH NEW DOCTOR: Status: RESOLVED | Noted: 2017-09-21 | Resolved: 2018-01-18

## 2018-01-18 PROCEDURE — 99214 OFFICE O/P EST MOD 30 MIN: CPT | Performed by: NURSE PRACTITIONER

## 2018-01-18 RX ORDER — HYDROCODONE BITARTRATE AND ACETAMINOPHEN 5; 325 MG/1; MG/1
1 TABLET ORAL EVERY 8 HOURS PRN
Qty: 90 TAB | Refills: 0 | Status: SHIPPED | OUTPATIENT
Start: 2018-01-18 | End: 2018-02-17

## 2018-01-18 ASSESSMENT — PATIENT HEALTH QUESTIONNAIRE - PHQ9
CLINICAL INTERPRETATION OF PHQ2 SCORE: 2
5. POOR APPETITE OR OVEREATING: 1 - SEVERAL DAYS
SUM OF ALL RESPONSES TO PHQ QUESTIONS 1-9: 12

## 2018-01-19 NOTE — ASSESSMENT & PLAN NOTE
Is here for pain medication refills.  Discussed that due to recent changes in our laws, we are no longer managing chronic pain in this office.  She will be given a 30-day prescription for Norco, and she will discuss future pain meds with Dr. Rodriguez.

## 2018-01-19 NOTE — PROGRESS NOTES
Chief Complaint   Patient presents with   • Annual Exam   • Medication Refill       HISTORY OF PRESENT ILLNESS: Patient is a 57 y.o. female established patient who presents today to discuss the following issues:    Dysthymia  Patient has been frustrated with her back pain issues.  She denies depression, SI, and HI.      Chronic back pain greater than 3 months duration  Is here for pain medication refills.  Discussed that due to recent changes in our laws, we are no longer managing chronic pain in this office.  She will be given a 30-day prescription for Norco, and she will discuss future pain meds with Dr. Rodriguez.        Patient Active Problem List    Diagnosis Date Noted   • Dysthymia 01/18/2018   • Nicotine vapor product user 03/28/2016   • Chronic back pain greater than 3 months duration 03/23/2015   • Hypokalemia 05/31/2013   • Hypertension 04/15/2013   • Routine health maintenance 04/15/2013       Allergies:Patient has no known allergies.    Current Outpatient Prescriptions   Medication Sig Dispense Refill   • hydrocodone-acetaminophen (NORCO) 5-325 MG Tab per tablet Take 1 Tab by mouth every 8 hours as needed for up to 30 days. 90 Tab 0   • lisinopril (PRINIVIL) 20 MG Tab TAKE 1 TABLET BY MOUTH EVERY DAY. 90 Tab 1   • amlodipine (NORVASC) 5 MG Tab TAKE 1 TABLET BY MOUTH EVERY DAY 90 Tab 0   • acetaminophen (TYLENOL) 500 MG Tab Take 500-1,000 mg by mouth every 6 hours as needed.     • ibuprofen (MOTRIN) 200 MG TABS Take 200 mg by mouth every 6 hours as needed.     • Multiple Vitamins-Minerals (ONE-A-DAY 50 PLUS PO) Take  by mouth every day.       No current facility-administered medications for this visit.        Social History   Substance Use Topics   • Smoking status: Former Smoker     Packs/day: 1.00     Years: 31.00     Types: Cigarettes     Quit date: 7/14/2015   • Smokeless tobacco: Never Used      Comment: electronic cigarette   • Alcohol use 0.0 oz/week      Comment: 3-4  beer q week       Family Status  "  Relation Status   • Mother    • Father    • Sister Alive   • Neg Hx      Family History   Problem Relation Age of Onset   • Hyperlipidemia Mother    • Heart Disease Mother    • Diabetes Mother    • Thyroid Mother    • Hyperlipidemia Father    • Cancer Father      leukemia due to chemical exposure   • Thyroid Sister    • Stroke Neg Hx        Review of Systems:   Constitutional: Negative for fever, chills, weight loss and malaise/fatigue.   HENT: Negative for ear pain, nosebleeds, congestion, sore throat and neck pain.    Eyes: Negative for blurred vision.   Respiratory: Negative for cough, sputum production, shortness of breath and wheezing.    Cardiovascular: Negative for chest pain, palpitations, orthopnea and leg swelling.   Gastrointestinal: Negative for heartburn, nausea, vomiting and abdominal pain.   Genitourinary: Negative for dysuria, urgency and frequency.   Musculoskeletal: Negative for myalgias and joint pain.  Positive for chronic low back pain.  Skin: Negative for rash and itching.   Neurological: Negative for dizziness, tingling, tremors, sensory change, focal weakness and headaches.   Endo/Heme/Allergies: Does not bruise/bleed easily.   Psychiatric/Behavioral: Positive for dysthymia.  Denies depression, SI, and HI.    All other systems reviewed and are negative except as in HPI.    Exam:  Blood pressure 114/82, pulse 68, temperature 36.9 °C (98.4 °F), resp. rate 16, height 1.676 m (5' 6\"), weight 86.2 kg (190 lb), SpO2 95 %.  General:  Well nourished, well developed female in NAD  Head: Grossly normal.  Neck: Supple without JVD or bruit. Thyroid is not enlarged.  Pulmonary: Clear to ausculation. Normal effort. No rales, ronchi, or wheezing.  Cardiovascular: Regular rate and rhythm without murmur.   Abdomen:  Soft, nontender, nondistended.  Extremities: No clubbing, cyanosis, or edema.  Skin: Intact with no obvious rashes or lesions.  Neuro: Grossly intact.  Psych: Alert and oriented " x 3.  Mood and affect appropriate.    Medical decision-making and discussion: Jayashree is here today for follow-up. She was given a prescription for Norco, and she will discuss further pain management with Dr. Rodriguez.  She will follow-up here as needed.          Assessment/Plan:  1. Chronic back pain greater than 3 months duration  hydrocodone-acetaminophen (NORCO) 5-325 MG Tab per tablet    CONSENT FOR OPIATE PRESCRIPTION   2. Chronic use of opiate drugs therapeutic purposes  hydrocodone-acetaminophen (NORCO) 5-325 MG Tab per tablet    CONSENT FOR OPIATE PRESCRIPTION   3. It band syndrome, right  hydrocodone-acetaminophen (NORCO) 5-325 MG Tab per tablet    CONSENT FOR OPIATE PRESCRIPTION   4. Dysthymia  Patient has been identified as being depressed and appropriate orders and counseling have been given       Return if symptoms worsen or fail to improve.    Please note that this dictation was created using voice recognition software. I have made every reasonable attempt to correct obvious errors, but I expect that there are errors of grammar and possibly content that I did not discover before finalizing the note.

## 2018-01-25 ENCOUNTER — TELEPHONE (OUTPATIENT)
Dept: MEDICAL GROUP | Facility: PHYSICIAN GROUP | Age: 58
End: 2018-01-25

## 2018-01-25 DIAGNOSIS — I10 ESSENTIAL HYPERTENSION: ICD-10-CM

## 2018-01-25 DIAGNOSIS — E87.6 HYPOKALEMIA: ICD-10-CM

## 2018-01-26 RX ORDER — AMLODIPINE BESYLATE 5 MG/1
TABLET ORAL
Qty: 90 TAB | Refills: 0 | Status: SHIPPED | OUTPATIENT
Start: 2018-01-26 | End: 2018-05-03 | Stop reason: SDUPTHER

## 2018-01-26 NOTE — TELEPHONE ENCOUNTER
Was the patient seen in the last year in this department? Yes     Does patient have an active prescription for medications requested? No     Received Request Via: Pharmacy      Pt met protocol?: Yes    OV 1/18    BP Readings from Last 1 Encounters:   01/18/18 114/82

## 2018-02-16 ENCOUNTER — OFFICE VISIT (OUTPATIENT)
Dept: PHYSICAL MEDICINE AND REHAB | Facility: MEDICAL CENTER | Age: 58
End: 2018-02-16
Payer: COMMERCIAL

## 2018-02-16 VITALS
OXYGEN SATURATION: 99 % | WEIGHT: 194 LBS | BODY MASS INDEX: 31.18 KG/M2 | HEIGHT: 66 IN | SYSTOLIC BLOOD PRESSURE: 122 MMHG | DIASTOLIC BLOOD PRESSURE: 80 MMHG | HEART RATE: 73 BPM | TEMPERATURE: 95.9 F

## 2018-02-16 DIAGNOSIS — M51.36 DDD (DEGENERATIVE DISC DISEASE), LUMBAR: ICD-10-CM

## 2018-02-16 DIAGNOSIS — M62.838 MUSCLE SPASM: ICD-10-CM

## 2018-02-16 DIAGNOSIS — M25.559 ARTHRALGIA OF HIP, UNSPECIFIED LATERALITY: ICD-10-CM

## 2018-02-16 DIAGNOSIS — M54.16 LUMBAR RADICULITIS: ICD-10-CM

## 2018-02-16 DIAGNOSIS — M54.9 MID BACK PAIN: ICD-10-CM

## 2018-02-16 DIAGNOSIS — M47.816 LUMBAR SPONDYLOSIS: ICD-10-CM

## 2018-02-16 DIAGNOSIS — M54.50 LUMBOSACRAL PAIN: ICD-10-CM

## 2018-02-16 DIAGNOSIS — M79.2 NERVE PAIN: ICD-10-CM

## 2018-02-16 PROCEDURE — 99213 OFFICE O/P EST LOW 20 MIN: CPT | Performed by: PHYSICAL MEDICINE & REHABILITATION

## 2018-02-16 RX ORDER — BACLOFEN 10 MG/1
TABLET ORAL
Qty: 40 TAB | Refills: 1 | Status: SHIPPED | OUTPATIENT
Start: 2018-02-16 | End: 2018-03-21 | Stop reason: SDUPTHER

## 2018-02-16 RX ORDER — GABAPENTIN 300 MG/1
300 CAPSULE ORAL 3 TIMES DAILY
Qty: 40 CAP | Refills: 1 | Status: SHIPPED | OUTPATIENT
Start: 2018-02-16 | End: 2018-03-21 | Stop reason: SDUPTHER

## 2018-02-16 NOTE — PROGRESS NOTES
Special Procedures H&P:    Subjective: Ms. Anthony notes ongoing pain in the right greater than left lumbosacral region, notes radiating pain to the right hip/thigh with neuropathic component, worse with activities, limiting standing and walking tolerance. She has had prior treatment with medications. She has tried therapy/exercise. She has tried modalities. No bowel/bladder dysfunction noted. No overt lower limb weakness noted. She is making an effort with home exercise program. She is inquiring about additional nonsurgical treatment options.     MEDICAL RECORDS REVIEW/DATA REVIEW: Reviewed in epic.     I reviewed medications.      I reviewed diagnostic studies:      I reviewed radiographs.      Reviewed MRI lumbar spine 12/2016. Reviewed lumbar spine x-rays 12/2016.       I reviewed lab studies. Reviewed CMP 12/2016. A1C 12/2016 of 5.2.     I reviewed medical issues. Followed by primary care provider, records reviewed.     I reviewed family history: No neuromuscular disorders noted.     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     PAST MEDICAL HISTORY:   Past Medical History        Past Medical History:   Diagnosis Date   • Active smoker 4/15/2013   • Arthritis     • Chronic back pain greater than 3 months duration 3/23/2015   • Hypertension     • Hypertension 4/15/2013   • Varicose vein              PAST SURGICAL HISTORY:    Past Surgical History         Past Surgical History:   Procedure Laterality Date   • CERVICAL FUSION POSTERIOR   12/27/2011     Performed by ROBERT SIGALA at SUR[facility]ERY [facility]   • CERVICAL LAMINECTOMY POSTERIOR   12/27/2011     Performed by ROBERT SIGALA at SURGERY [facility]   • OTHER ORTHOPEDIC SURGERY   1968     right  arm orif            ALLERGIES:  Review of patient's allergies indicates no known allergies.     MEDICATIONS:    Encounter Medications          Outpatient Encounter Prescriptions as of 2/16/2018   Medication Sig Dispense Refill   •  baclofen (LIORESAL) 10 MG Tab Take 1/2  to 1 tablet by mouth three times per day as needed for muscle spasm 40 Tab 1   • gabapentin (NEURONTIN) 300 MG Cap Take 1 Cap by mouth 3 times a day. as needed for nerve pain 40 Cap 1   • amlodipine (NORVASC) 5 MG Tab TAKE 1 TABLET BY MOUTH EVERY DAY 90 Tab 0   • hydrocodone-acetaminophen (NORCO) 5-325 MG Tab per tablet Take 1 Tab by mouth every 8 hours as needed for up to 30 days. 90 Tab 0   • lisinopril (PRINIVIL) 20 MG Tab TAKE 1 TABLET BY MOUTH EVERY DAY. 90 Tab 1   • acetaminophen (TYLENOL) 500 MG Tab Take 500-1,000 mg by mouth every 6 hours as needed.       • ibuprofen (MOTRIN) 200 MG TABS Take 200 mg by mouth every 6 hours as needed.       • Multiple Vitamins-Minerals (ONE-A-DAY 50 PLUS PO) Take  by mouth every day.          No facility-administered encounter medications on file as of 2/16/2018.             SOCIAL HISTORY:    Social History               Social History   • Marital status:        Spouse name: N/A   • Number of children: N/A   • Years of education: N/A             Social History Main Topics   • Smoking status: Former Smoker       Packs/day: 1.00       Years: 31.00       Types: Cigarettes       Quit date: 7/14/2015   • Smokeless tobacco: Never Used         Comment: electronic cigarette   • Alcohol use 0.0 oz/week         Comment: 3-4  beer q week   • Drug use: No   • Sexual activity: Yes       Partners: Male         Comment:             Other Topics Concern   •  Service No   • Blood Transfusions No   • Caffeine Concern No   • Occupational Exposure No   • Hobby Hazards Yes       motorcycle riding    • Sleep Concern Yes       pain   • Stress Concern Yes   • Weight Concern Yes   • Special Diet No   • Back Care Yes   • Exercise Yes       tries    • Bike Helmet No   • Seat Belt Yes   • Self-Exams Yes          Social History Narrative   • No narrative on file            Review of Systems   Constitutional: Negative for fever and chills.  "  HENT: Negative for hearing loss and tinnitus.    Eyes: Negative for blurred vision and double vision.   Respiratory: Negative for cough and hemoptysis.    Cardiovascular: Negative for chest pain and palpitations.   Gastrointestinal: Negative for nausea and vomiting.   Genitourinary: Negative for urgency and frequency.   Musculoskeletal: Positive for myalgias, back pain and joint pain.   Skin: Negative.    Neurological: Positive for tingling and sensory change.   Endo/Heme/Allergies: Negative.    All other systems reviewed and are negative.         Objective:      /80   Pulse 73   Temp (!) 35.5 °C (95.9 °F)   Ht 1.676 m (5' 6\")   Wt 88 kg (194 lb)   SpO2 99%   BMI 31.31 kg/m²       Physical Exam  Constitutional: oriented to person, place, and time, appears well-developed and well-nourished.   HEENT: Normocephalic atraumatic, neck supple, no JVD noted, no masses noted, no meningeal signs noted  Lymphadenopathy: no cervical, supraclavicular, or inguinal lymphadenopathy noted  Cardiovascular: Intact distal pulses, including at ankles, no limb swelling noted  Pulmonary: No tachypnea noted, no accessory muscle use noted, no dyspnea noted  Abdominal: Soft, nontender, exhibits no distension, no peritoneal signs, no HSM  Musculoskeletal:   Right hip: exhibits only mild  Tenderness, only mild pain with range of motion testing  Left hip: exhibits only mild tenderness. Minimal pain with range of motion testing  Lumbar back: exhibits decreased range of motion, tenderness and pain. straight leg testing produces posterior pelvic and thigh pain on the right, trigger points noted, mild tender right sacroiliac joint region, mild pain noted with quadrant loading and extension to the right, scoliosis noted  Thoracic: Mild tender with palpation lower thoracic region, mild pain with range of motion testing, scoliosis noted  Neurological: oriented to person, place, and time. Cranial nerves grossly intact, normal strength. " Sensation intact distally. Reflexes 1+ in lower limbs, Gait mildly antalgic, reciprocal, No upper motor neuron signs evident  Skin: Skin is intact. no rashes or lesions noted  Psychiatric: normal mood and affect. speech is normal and behavior is normal. Judgment and thought content normal. Cognition and memory are normal.       Assessment:      Lumbar radiculitis      Plan:      Right lumbar 2 and 3 transforaminal epidural steroid injections

## 2018-02-16 NOTE — PROGRESS NOTES
Subjective:      Jayashree Anthony presents with Follow-Up        Subjective: Ms. Anthony returns to the office today for follow-up evaluation of spinal/joints/musculoskeletal pain, note history of Scheuermann's disease.    The patient notes some benefit with the thoracolumbar trigger point injections performed last visit, tolerated, notes a change with her pain pattern.    The patient now notes ongoing pain in the right greater than left lumbosacral region, notes radiating pain to the right hip/thigh with neuropathic component, worse with activities, limiting standing and walking tolerance.    The patient notes intermittent mid back pain, controlled, without radicular component.    She notes intermittent right hip area pain, relatively controlled, note prior hip bursa injection    She has had prior cervical spine surgery through [facility], controlled.    She has had prior treatment with medications. She has tried therapy/exercise. She has tried modalities. No bowel/bladder dysfunction noted. No overt lower limb weakness noted. She is making an effort with home exercise program. She is inquiring about additional nonsurgical treatment options.      MEDICAL RECORDS REVIEW/DATA REVIEW: Reviewed in epic.    I reviewed medications. Pain medications per primary care provider    I reviewed diagnostic studies:     I reviewed radiographs.     Reviewed MRI lumbar spine 12/2016. Reviewed lumbar spine x-rays 12/2016.     Reviewed thoracic spine x-rays 7/2017, showed mild degenerative disc disease.     Reviewed right hip x-rays 7/2017, showed mild bilateral hip arthritis.     Reviewed cervical spine x-rays 1/2013.    I reviewed lab studies. Reviewed CMP 12/2016. A1C 12/2016 of 5.2.    I reviewed medical issues. Followed by primary care provider, records reviewed.    I reviewed family history: No neuromuscular disorders noted.    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      PAST MEDICAL HISTORY:    Past Medical History:   Diagnosis Date   • Active smoker 4/15/2013   • Arthritis    • Chronic back pain greater than 3 months duration 3/23/2015   • Hypertension    • Hypertension 4/15/2013   • Varicose vein        PAST SURGICAL HISTORY:    Past Surgical History:   Procedure Laterality Date   • CERVICAL FUSION POSTERIOR  12/27/2011    Performed by ROBERT SIGALA at SURGERY [facility] ORS   • CERVICAL LAMINECTOMY POSTERIOR  12/27/2011    Performed by ROBERT SIGALA at SURGERY [facility] ORS   • OTHER ORTHOPEDIC SURGERY  1968    right  arm orif       ALLERGIES:  Review of patient's allergies indicates no known allergies.    MEDICATIONS:    Outpatient Encounter Prescriptions as of 2/16/2018   Medication Sig Dispense Refill   • baclofen (LIORESAL) 10 MG Tab Take 1/2  to 1 tablet by mouth three times per day as needed for muscle spasm 40 Tab 1   • gabapentin (NEURONTIN) 300 MG Cap Take 1 Cap by mouth 3 times a day. as needed for nerve pain 40 Cap 1   • amlodipine (NORVASC) 5 MG Tab TAKE 1 TABLET BY MOUTH EVERY DAY 90 Tab 0   • hydrocodone-acetaminophen (NORCO) 5-325 MG Tab per tablet Take 1 Tab by mouth every 8 hours as needed for up to 30 days. 90 Tab 0   • lisinopril (PRINIVIL) 20 MG Tab TAKE 1 TABLET BY MOUTH EVERY DAY. 90 Tab 1   • acetaminophen (TYLENOL) 500 MG Tab Take 500-1,000 mg by mouth every 6 hours as needed.     • ibuprofen (MOTRIN) 200 MG TABS Take 200 mg by mouth every 6 hours as needed.     • Multiple Vitamins-Minerals (ONE-A-DAY 50 PLUS PO) Take  by mouth every day.       No facility-administered encounter medications on file as of 2/16/2018.        SOCIAL HISTORY:    Social History     Social History   • Marital status:      Spouse name: N/A   • Number of children: N/A   • Years of education: N/A     Social History Main Topics   • Smoking status: Former Smoker     Packs/day: 1.00     Years: 31.00     Types: Cigarettes     Quit date: 7/14/2015   • Smokeless tobacco: Never Used      Comment:  "electronic cigarette   • Alcohol use 0.0 oz/week      Comment: 3-4  beer q week   • Drug use: No   • Sexual activity: Yes     Partners: Male      Comment:      Other Topics Concern   •  Service No   • Blood Transfusions No   • Caffeine Concern No   • Occupational Exposure No   • Hobby Hazards Yes     motorcycle riding    • Sleep Concern Yes     pain   • Stress Concern Yes   • Weight Concern Yes   • Special Diet No   • Back Care Yes   • Exercise Yes     tries    • Bike Helmet No   • Seat Belt Yes   • Self-Exams Yes     Social History Narrative   • No narrative on file       Review of Systems   Constitutional: Negative for fever and chills.   HENT: Negative for hearing loss and tinnitus.    Eyes: Negative for blurred vision and double vision.   Respiratory: Negative for cough and hemoptysis.    Cardiovascular: Negative for chest pain and palpitations.   Gastrointestinal: Negative for nausea and vomiting.   Genitourinary: Negative for urgency and frequency.   Musculoskeletal: Positive for myalgias, back pain and joint pain.   Skin: Negative.    Neurological: Positive for tingling and sensory change.   Endo/Heme/Allergies: Negative.    All other systems reviewed and are negative.        Objective:     /80   Pulse 73   Temp (!) 35.5 °C (95.9 °F)   Ht 1.676 m (5' 6\")   Wt 88 kg (194 lb)   SpO2 99%   BMI 31.31 kg/m²      Physical Exam  Constitutional: oriented to person, place, and time, appears well-developed and well-nourished.   HEENT: Normocephalic atraumatic, neck supple, no JVD noted, no masses noted, no meningeal signs noted  Lymphadenopathy: no cervical, supraclavicular, or inguinal lymphadenopathy noted  Cardiovascular: Intact distal pulses, including at ankles, no limb swelling noted  Pulmonary: No tachypnea noted, no accessory muscle use noted, no dyspnea noted  Abdominal: Soft, nontender, exhibits no distension, no peritoneal signs, no HSM  Musculoskeletal:   Right hip: exhibits only " mild  Tenderness, only mild pain with range of motion testing  Left hip: exhibits only mild tenderness. Minimal pain with range of motion testing  Lumbar back: exhibits decreased range of motion, tenderness and pain. straight leg testing produces posterior pelvic and thigh pain on the right, trigger points noted, mild tender right sacroiliac joint region, mild pain noted with quadrant loading and extension to the right, scoliosis noted  Thoracic: Mild tender with palpation lower thoracic region, mild pain with range of motion testing, scoliosis noted  Neurological: oriented to person, place, and time. Cranial nerves grossly intact, normal strength. Sensation intact distally. Reflexes 1+ in lower limbs, Gait mildly antalgic, reciprocal, No upper motor neuron signs evident  Skin: Skin is intact. no rashes or lesions noted  Psychiatric: normal mood and affect. speech is normal and behavior is normal. Judgment and thought content normal. Cognition and memory are normal.         Assessment/Plan:       ASSESSMENT:    1. Lumbosacral pain, myofascial pain, lumbar radiculitis, foraminal stenosis, listhesis, degenerative disc disease, facet arthropathy, lumbar spondylosis, scoliosis    - Reviewed injection therapy with right lumbar 2 and 3 transforaminal epidural steroid injections for treatment of the ongoing lumbar radicular pain, persisting despite the conservative treatment to date, including medications, therapy/exercise, and time. Note the ongoing pain limits her ability to function, including standing and walking tolerance.    2. Mid back pain, myofascial pain, degenerative disc disease, spondylosis, H/O Scheuermann disease, scoliosis, symptomatically controlled    3. Right hip pain, sprain strain, bursitis/tendinopathy, mild bilateral hip arthritis, controlled    4. History of cervical spine surgery, symptomatically controlled    5. Comorbid medical issues, with care per primary care  provider      DISCUSSION/PLAN:    - I discussed management options. I reviewed symptomatic care    - I reviewed home exercise program, with medical precautions. I reviewed activity modification    - The patient can consider complementary trials with acupuncture, massage therapy, or TENS unit    - I reviewed medication monitoring. I reviewed medication adjustments. I wrote prescriptions for baclofen and gabapentin in an effort to help symptomatically. The patient can continue with hydrocodone, has small quantity remaining, prescribed by primary care provider. I reviewed risks, side effects, and interactions of medications, including over-the-counter medications. I reviewed further symptomatic medications.    - I reviewed additional diagnostic options, including further/advanced imaging, including lumbar spine flexion/extension radiograph, electrodiagnostic testing, vascular studies, and further lab screening    - I reviewed additional therapeutic options, including further injection/interventional therapy and additional consultative input.    - I reviewed psychosocial interventions    - I will plan on seeing the patient back in the procedure center for the above noted intervention or in the office where we can further review management options      Please note that this dictation was created using voice recognition software. I have made every reasonable attempt to correct obvious errors but there may be errors of grammar and content that I may have overlooked prior to finalization of this note.

## 2018-03-06 ENCOUNTER — HOSPITAL ENCOUNTER (OUTPATIENT)
Dept: PAIN MANAGEMENT | Facility: REHABILITATION | Age: 58
End: 2018-03-06
Attending: PHYSICAL MEDICINE & REHABILITATION
Payer: COMMERCIAL

## 2018-03-06 ENCOUNTER — HOSPITAL ENCOUNTER (OUTPATIENT)
Dept: RADIOLOGY | Facility: REHABILITATION | Age: 58
End: 2018-03-06
Attending: PHYSICAL MEDICINE & REHABILITATION
Payer: COMMERCIAL

## 2018-03-06 VITALS
RESPIRATION RATE: 16 BRPM | TEMPERATURE: 97.7 F | OXYGEN SATURATION: 98 % | WEIGHT: 194.67 LBS | HEIGHT: 66 IN | BODY MASS INDEX: 31.29 KG/M2 | HEART RATE: 69 BPM | SYSTOLIC BLOOD PRESSURE: 118 MMHG | DIASTOLIC BLOOD PRESSURE: 79 MMHG

## 2018-03-06 PROCEDURE — 700111 HCHG RX REV CODE 636 W/ 250 OVERRIDE (IP)

## 2018-03-06 PROCEDURE — 99152 MOD SED SAME PHYS/QHP 5/>YRS: CPT

## 2018-03-06 PROCEDURE — 64484 NJX AA&/STRD TFRM EPI L/S EA: CPT

## 2018-03-06 PROCEDURE — 64483 NJX AA&/STRD TFRM EPI L/S 1: CPT

## 2018-03-06 PROCEDURE — 700117 HCHG RX CONTRAST REV CODE 255

## 2018-03-06 RX ORDER — METHYLPREDNISOLONE ACETATE 80 MG/ML
INJECTION, SUSPENSION INTRA-ARTICULAR; INTRALESIONAL; INTRAMUSCULAR; SOFT TISSUE
Status: COMPLETED
Start: 2018-03-06 | End: 2018-03-06

## 2018-03-06 RX ORDER — METHYLPREDNISOLONE ACETATE 40 MG/ML
INJECTION, SUSPENSION INTRA-ARTICULAR; INTRALESIONAL; INTRAMUSCULAR; SOFT TISSUE
Status: COMPLETED
Start: 2018-03-06 | End: 2018-03-06

## 2018-03-06 RX ORDER — LIDOCAINE HYDROCHLORIDE 10 MG/ML
INJECTION, SOLUTION EPIDURAL; INFILTRATION; INTRACAUDAL; PERINEURAL
Status: COMPLETED
Start: 2018-03-06 | End: 2018-03-06

## 2018-03-06 RX ORDER — MIDAZOLAM HYDROCHLORIDE 1 MG/ML
INJECTION INTRAMUSCULAR; INTRAVENOUS
Status: COMPLETED
Start: 2018-03-06 | End: 2018-03-06

## 2018-03-06 RX ADMIN — METHYLPREDNISOLONE ACETATE 40 MG: 40 INJECTION, SUSPENSION INTRA-ARTICULAR; INTRALESIONAL; INTRAMUSCULAR; SOFT TISSUE at 14:57

## 2018-03-06 RX ADMIN — FENTANYL CITRATE 25 MCG: 50 INJECTION, SOLUTION INTRAMUSCULAR; INTRAVENOUS at 14:48

## 2018-03-06 RX ADMIN — LIDOCAINE HYDROCHLORIDE 10 ML: 10 INJECTION, SOLUTION EPIDURAL; INFILTRATION; INTRACAUDAL; PERINEURAL at 14:53

## 2018-03-06 RX ADMIN — METHYLPREDNISOLONE ACETATE 80 MG: 80 INJECTION, SUSPENSION INTRA-ARTICULAR; INTRALESIONAL; INTRAMUSCULAR; SOFT TISSUE at 14:57

## 2018-03-06 RX ADMIN — IOHEXOL 2 ML: 240 INJECTION, SOLUTION INTRATHECAL; INTRAVASCULAR; INTRAVENOUS; ORAL at 14:55

## 2018-03-06 RX ADMIN — MIDAZOLAM HYDROCHLORIDE 1 MG: 1 INJECTION, SOLUTION INTRAMUSCULAR; INTRAVENOUS at 14:49

## 2018-03-06 ASSESSMENT — PAIN SCALES - GENERAL
PAINLEVEL_OUTOF10: 4
PAINLEVEL_OUTOF10: 1
PAINLEVEL_OUTOF10: 3

## 2018-03-06 NOTE — PROGRESS NOTES
Current medications reviewed with pt, see medications reconciliation form. Pt americo taking ASA or other blood thinners or anti-inflammatories.  Pt has a ride post-procedure( to drive).  Printed and verbal discharge instructions given to pt who verbalized understanding.

## 2018-03-06 NOTE — PROGRESS NOTES
Today performed Right lumbar 2 and 3 transforaminal epidural steroid injections with procedural sedation. Dictation to follow.

## 2018-03-06 NOTE — PROGRESS NOTES
Patient positioned by RN,  CNA & X - ray Tech. Hands supported on stool under head of the bed, lower extremities and feet pillow placed for support.  Patient tolerated procedure well. Ambulatory to recovery room.

## 2018-03-06 NOTE — PROCEDURES
DATE OF SERVICE:  03/06/2018    DIAGNOSIS:  Lumbar radiculitis.    PROCEDURE:  Right lumbar 2 and 3 transforaminal epidural steroid injections.    PROCEDURE NOTE:  Informed consent was obtained.  Risks, benefits and   alternatives discussed, and questions answered.  An angiocatheter was placed   in the upper limb.  The patient placed prone on the fluoroscopy table.  The   skin area from T12-S4 was prepared in a sterile manner with povidone-iodine.    Intravenous conscious sedation using 1 mg of midazolam and 525 mcg of fentanyl   was administered to the patient.  The patient was monitored throughout the   procedure.    Fluoroscopy was turned on and the right L2-L3 and L3-L4 foramen were   identified.  Those areas were then locally anesthetized with 2 mL of 1%   lidocaine.  Using fluoroscopy, 22-gauge spinal needles were inserted and   advanced into the foramen, confirmed on multiplanar imaging.  Then, 1-2 mL of   Iohexol Omnipaque dye was injected into the area allowing visualization of the  selective nerve root and epidural space.    Following negative aspiration, 3 mL of mixture of 120 mg of methylprednisolone   and 4 mL of 1% preservative-free lidocaine was injected at each level.  At   the L2 and L3 levels, the patient's pain was reproduced.  A total of 2 spinal   needles were used for the injections.  There were no complications.    The patient was transported to recovery.  The patient noted decreased pain   post-procedure.  Postinjection instructions were reviewed with the patient.    The patient was discharged home in the care of a friend and/or a family   member.       ____________________________________     MD MERT GORDILLO / CUONG    DD:  03/06/2018 15:41:42  DT:  03/06/2018 15:54:01    D#:  [id number]  Job#:  [id number]

## 2018-03-07 ENCOUNTER — TELEPHONE (OUTPATIENT)
Dept: PHYSICAL MEDICINE AND REHAB | Facility: MEDICAL CENTER | Age: 58
End: 2018-03-07

## 2018-03-21 ENCOUNTER — OFFICE VISIT (OUTPATIENT)
Dept: PHYSICAL MEDICINE AND REHAB | Facility: MEDICAL CENTER | Age: 58
End: 2018-03-21
Payer: COMMERCIAL

## 2018-03-21 VITALS
HEIGHT: 66 IN | WEIGHT: 189 LBS | OXYGEN SATURATION: 100 % | HEART RATE: 67 BPM | TEMPERATURE: 97.5 F | SYSTOLIC BLOOD PRESSURE: 122 MMHG | BODY MASS INDEX: 30.37 KG/M2 | DIASTOLIC BLOOD PRESSURE: 80 MMHG

## 2018-03-21 DIAGNOSIS — M62.838 MUSCLE SPASM: ICD-10-CM

## 2018-03-21 DIAGNOSIS — M79.18 MYOFASCIAL PAIN: ICD-10-CM

## 2018-03-21 DIAGNOSIS — M54.16 LUMBAR RADICULITIS: ICD-10-CM

## 2018-03-21 DIAGNOSIS — M54.50 LUMBOSACRAL PAIN: ICD-10-CM

## 2018-03-21 DIAGNOSIS — M25.559 ARTHRALGIA OF HIP, UNSPECIFIED LATERALITY: ICD-10-CM

## 2018-03-21 DIAGNOSIS — M79.2 NERVE PAIN: ICD-10-CM

## 2018-03-21 PROCEDURE — 99213 OFFICE O/P EST LOW 20 MIN: CPT | Performed by: PHYSICAL MEDICINE & REHABILITATION

## 2018-03-21 RX ORDER — BACLOFEN 10 MG/1
TABLET ORAL
Qty: 60 TAB | Refills: 1 | Status: SHIPPED | OUTPATIENT
Start: 2018-03-21 | End: 2018-04-24 | Stop reason: SDUPTHER

## 2018-03-21 RX ORDER — GABAPENTIN 300 MG/1
300 CAPSULE ORAL 3 TIMES DAILY
Qty: 60 CAP | Refills: 1 | Status: SHIPPED | OUTPATIENT
Start: 2018-03-21 | End: 2018-04-24 | Stop reason: SDUPTHER

## 2018-03-22 NOTE — PROGRESS NOTES
Subjective:      Jayashree Anthony presents with Follow-Up        Subjective: Ms. Anthony returns to the office today for follow-up evaluation of spinal/joints/musculoskeletal pain, note history of Scheuermann's disease.    The patient underwent right lumbar 2 and 3 transforaminal epidural steroid injections on 3/6/2018, the patient notes a good response with the intervention, denies complications. At the follow-up visit today the patient notes approximately 60-70 improvement with pain, lumbar radicular pain is improved, change with her pain pattern.    She now notes pain in the right lumbosacral region, intermittent, without overt radicular component.    She notes intermittent right hip area pain.    The patient notes intermittent mid back pain, controlled, without radicular component.    She has had prior cervical spine surgery through [facility], [facility].    She has had prior treatment with medications. She has tried therapy/exercise. She has tried modalities. No bowel/bladder dysfunction noted. No overt lower limb weakness noted. She is making an effort with home exercise program. She has been able to improve her function. She is pleased by the progress to date.      MEDICAL RECORDS REVIEW/DATA REVIEW: Reviewed in epic.    I reviewed medications. Tolerating baclofen and gabapentin, with benefit.    I reviewed diagnostic studies:     I reviewed radiographs.     Reviewed MRI lumbar spine 12/2016. Reviewed lumbar spine x-rays 12/2016.     Reviewed thoracic spine x-rays 7/2017, showed mild degenerative disc disease.     Reviewed right hip x-rays 7/2017, showed mild bilateral hip arthritis.     Reviewed cervical spine x-rays 1/2013.    I reviewed lab studies. Reviewed CMP 12/2016. A1C 12/2016 of 5.2.    I reviewed medical issues. Followed by primary care provider, records reviewed.    I reviewed family history: No neuromuscular disorders noted.    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      PAST MEDICAL HISTORY:   Past Medical History:   Diagnosis Date   • Active smoker 4/15/2013   • Arthritis    • Chronic back pain greater than 3 months duration 3/23/2015   • Hypertension    • Hypertension 4/15/2013   • Varicose vein        PAST SURGICAL HISTORY:    Past Surgical History:   Procedure Laterality Date   • CERVICAL FUSION POSTERIOR  12/27/2011    Performed by ROBERT SIGALA at SURGERY [facility] ORS   • CERVICAL LAMINECTOMY POSTERIOR  12/27/2011    Performed by ROBERT SIGALA at SURGERY [facility] ORS   • OTHER ORTHOPEDIC SURGERY  1968    right  arm orif       ALLERGIES:  Review of patient's allergies indicates no known allergies.    MEDICATIONS:    Outpatient Encounter Prescriptions as of 3/21/2018   Medication Sig Dispense Refill   • gabapentin (NEURONTIN) 300 MG Cap Take 1 Cap by mouth 3 times a day. as needed for nerve pain 60 Cap 1   • baclofen (LIORESAL) 10 MG Tab Take 1/2  to 1 tablet by mouth three times per day as needed for muscle spasm 60 Tab 1   • amlodipine (NORVASC) 5 MG Tab TAKE 1 TABLET BY MOUTH EVERY DAY 90 Tab 0   • lisinopril (PRINIVIL) 20 MG Tab TAKE 1 TABLET BY MOUTH EVERY DAY. 90 Tab 1   • Multiple Vitamins-Minerals (ONE-A-DAY 50 PLUS PO) Take  by mouth every day.     • [DISCONTINUED] baclofen (LIORESAL) 10 MG Tab Take 1/2  to 1 tablet by mouth three times per day as needed for muscle spasm 40 Tab 1   • [DISCONTINUED] gabapentin (NEURONTIN) 300 MG Cap Take 1 Cap by mouth 3 times a day. as needed for nerve pain 40 Cap 1   • acetaminophen (TYLENOL) 500 MG Tab Take 500-1,000 mg by mouth every 6 hours as needed.       No facility-administered encounter medications on file as of 3/21/2018.        SOCIAL HISTORY:    Social History     Social History   • Marital status:      Spouse name: N/A   • Number of children: N/A   • Years of education: N/A     Social History Main Topics   • Smoking status: Former Smoker     Packs/day: 1.00     Years: 31.00     Types:  "Cigarettes     Quit date: 7/14/2015   • Smokeless tobacco: Never Used      Comment: electronic cigarette   • Alcohol use 0.0 oz/week      Comment: 3-4  beer q week   • Drug use: No   • Sexual activity: Yes     Partners: Male      Comment:      Other Topics Concern   •  Service No   • Blood Transfusions No   • Caffeine Concern No   • Occupational Exposure No   • Hobby Hazards Yes     motorcycle riding    • Sleep Concern Yes     pain   • Stress Concern Yes   • Weight Concern Yes   • Special Diet No   • Back Care Yes   • Exercise Yes     tries    • Bike Helmet No   • Seat Belt Yes   • Self-Exams Yes     Social History Narrative   • No narrative on file       Review of Systems   Constitutional: Negative for fever and chills.   HENT: Negative for hearing loss and tinnitus.    Eyes: Negative for blurred vision and double vision.   Respiratory: Negative for cough and hemoptysis.    Cardiovascular: Negative for chest pain and palpitations.   Gastrointestinal: Negative for nausea and vomiting.   Genitourinary: Negative for urgency and frequency.   Musculoskeletal: Positive for myalgias, back pain and joint pain.   Skin: Negative.    Neurological: Positive for tingling and sensory change.   Endo/Heme/Allergies: Negative.    All other systems reviewed and are negative.        Objective:     /80   Pulse 67   Temp 36.4 °C (97.5 °F)   Ht 1.676 m (5' 6\")   Wt 85.7 kg (189 lb)   SpO2 100%   BMI 30.51 kg/m²      Physical Exam  Constitutional: oriented to person, place, and time, appears well-developed and well-nourished.   HEENT: Normocephalic atraumatic, neck supple, no JVD noted, no masses noted, no meningeal signs noted  Lymphadenopathy: no cervical, supraclavicular, or inguinal lymphadenopathy noted  Cardiovascular: Intact distal pulses, including at ankles, no limb swelling noted  Pulmonary: No tachypnea noted, no accessory muscle use noted, no dyspnea noted  Abdominal: Soft, nontender, exhibits no " distension, no peritoneal signs, no HSM  Musculoskeletal:   Right hip: exhibits only mild  Tenderness, only mild pain with range of motion testing  Left hip: exhibits only mild tenderness. Minimal pain with range of motion testing  Lumbar back: exhibits decreased range of motion, tenderness and pain. straight leg testing negative, trigger points noted right lumbosacral region, mild tender right sacroiliac joint region, mild pain noted with quadrant loading and extension to the right, scoliosis noted  Thoracic: Mild tender with palpation lower thoracic region, mild pain with range of motion testing, scoliosis noted  Neurological: oriented to person, place, and time. Cranial nerves grossly intact, normal strength. Sensation intact distally. Reflexes 1+ in lower limbs, Gait mildly antalgic, reciprocal, No upper motor neuron signs evident  Skin: Skin is intact. no rashes or lesions noted  Psychiatric: normal mood and affect. speech is normal and behavior is normal. Judgment and thought content normal. Cognition and memory are normal.         Assessment/Plan:       ASSESSMENT:    1. Lumbosacral pain, myofascial pain, lumbar radiculitis, foraminal stenosis, listhesis, degenerative disc disease, facet arthropathy, lumbar spondylosis, scoliosis, symptomatically improved    - Reviewed injection therapy with trigger point injections to treat the residual myofascial component to the ongoing pain, submit authorization request    2. Mid back pain, myofascial pain, degenerative disc disease, spondylosis, H/O Scheuermann disease, scoliosis, symptomatically controlled    3. Right hip pain, sprain strain, bursitis/tendinopathy, mild bilateral hip arthritis, relatively controlled    4. History of cervical spine surgery, symptomatically controlled    5. Comorbid medical issues, with care per primary care provider      DISCUSSION/PLAN:    - I discussed management options. I reviewed symptomatic care    - I reviewed home exercise  program, with medical precautions. I reviewed activity modification    - The patient can consider complementary trials with acupuncture, massage therapy, or TENS unit    - I reviewed medication monitoring.  For now, continue current medications. I reviewed medication adjustments, updated prescriptions for baclofen and gabapentin. I reviewed risks, side effects, and interactions of medications, including over-the-counter medications. I reviewed further symptomatic medications.    - I reviewed additional diagnostic options, including further/advanced imaging, including lumbar spine flexion/extension radiograph, electrodiagnostic testing, vascular studies, and further lab screening    - I reviewed additional therapeutic options, including further injection/interventional therapy and additional consultative input.    - I reviewed psychosocial interventions    - Coordinate follow-up after getting authorization in place for above-noted injections, or an as-needed basis.      Please note that this dictation was created using voice recognition software. I have made every reasonable attempt to correct obvious errors but there may be errors of grammar and content that I may have overlooked prior to finalization of this note.

## 2018-04-12 ENCOUNTER — TELEPHONE (OUTPATIENT)
Dept: MEDICAL GROUP | Facility: PHYSICIAN GROUP | Age: 58
End: 2018-04-12

## 2018-04-12 DIAGNOSIS — L30.9 ECZEMA, UNSPECIFIED TYPE: ICD-10-CM

## 2018-04-12 NOTE — TELEPHONE ENCOUNTER
1. Caller Name: patient          Call Back Number: [phone] (home)         Patient approves a detailed voicemail message: N\A    2. SPECIFIC Action To Be Taken: Referral pending, please sign.    3. Diagnosis/Clinical Reason for Request: L30.9 Eczema    4. Specialty & Provider Name/Lab/Imaging Location: Dermatology    5. Is appointment scheduled for requested order/referral: no    Patient informed they will receive a return phone call from the office ONLY if there are any questions before processing their request. Advised to call back if they haven't received a call from the referral department in 5 days.

## 2018-04-23 ENCOUNTER — TELEPHONE (OUTPATIENT)
Dept: PHYSICAL MEDICINE AND REHAB | Facility: MEDICAL CENTER | Age: 58
End: 2018-04-23

## 2018-04-23 NOTE — TELEPHONE ENCOUNTER
Jayashree said she is having a bad flare up and wants to see Dr. Michael RAMIREZ. I scheduled her for tomorrow, 4/24/18 @ 9:00am.

## 2018-04-24 ENCOUNTER — OFFICE VISIT (OUTPATIENT)
Dept: PHYSICAL MEDICINE AND REHAB | Facility: MEDICAL CENTER | Age: 58
End: 2018-04-24
Payer: COMMERCIAL

## 2018-04-24 VITALS
HEIGHT: 66 IN | DIASTOLIC BLOOD PRESSURE: 80 MMHG | OXYGEN SATURATION: 100 % | SYSTOLIC BLOOD PRESSURE: 118 MMHG | TEMPERATURE: 97.7 F | BODY MASS INDEX: 30.37 KG/M2 | WEIGHT: 189 LBS | HEART RATE: 65 BPM

## 2018-04-24 DIAGNOSIS — M79.18 MYOFASCIAL PAIN: ICD-10-CM

## 2018-04-24 DIAGNOSIS — M54.50 LUMBOSACRAL PAIN: ICD-10-CM

## 2018-04-24 DIAGNOSIS — M62.838 MUSCLE SPASM: ICD-10-CM

## 2018-04-24 DIAGNOSIS — M79.2 NERVE PAIN: ICD-10-CM

## 2018-04-24 DIAGNOSIS — M25.559 ARTHRALGIA OF HIP, UNSPECIFIED LATERALITY: ICD-10-CM

## 2018-04-24 PROCEDURE — 20552 NJX 1/MLT TRIGGER POINT 1/2: CPT | Performed by: PHYSICAL MEDICINE & REHABILITATION

## 2018-04-24 PROCEDURE — 99212 OFFICE O/P EST SF 10 MIN: CPT | Mod: 25 | Performed by: PHYSICAL MEDICINE & REHABILITATION

## 2018-04-24 RX ORDER — GABAPENTIN 300 MG/1
300 CAPSULE ORAL 3 TIMES DAILY
Qty: 90 CAP | Refills: 2 | Status: SHIPPED | OUTPATIENT
Start: 2018-04-24 | End: 2018-08-07 | Stop reason: SDUPTHER

## 2018-04-24 RX ORDER — METHYLPREDNISOLONE ACETATE 40 MG/ML
40 INJECTION, SUSPENSION INTRA-ARTICULAR; INTRALESIONAL; INTRAMUSCULAR; SOFT TISSUE ONCE
Status: COMPLETED | OUTPATIENT
Start: 2018-04-24 | End: 2018-04-24

## 2018-04-24 RX ORDER — BACLOFEN 10 MG/1
TABLET ORAL
Qty: 90 TAB | Refills: 2 | Status: SHIPPED | OUTPATIENT
Start: 2018-04-24 | End: 2018-08-07 | Stop reason: SDUPTHER

## 2018-04-24 RX ORDER — IBUPROFEN 400 MG/1
400 TABLET ORAL EVERY 6 HOURS PRN
COMMUNITY
End: 2021-03-17

## 2018-04-24 RX ADMIN — METHYLPREDNISOLONE ACETATE 40 MG: 40 INJECTION, SUSPENSION INTRA-ARTICULAR; INTRALESIONAL; INTRAMUSCULAR; SOFT TISSUE at 12:03

## 2018-04-24 NOTE — PROGRESS NOTES
Subjective:      Jayashree Anthony presents with Follow-Up        Subjective: Ms. Anthony returns to the office today for follow-up evaluation of spinal/joints/musculoskeletal pain, note history of Scheuermann's disease.    The patient notes that lumbosacral pain was relatively controlled since I last saw her, until a flare of pain beginning late last week, possibly associated with some physical activities.    She now notes ongoing pain in the right greater than left lumbosacral region, with some radiation towards the right hip, without overt radicular component. The patient previously had benefit with right lumbar 2 and 3 transforaminal epidural steroid injections on 3/6/2018, for the lumbar radicular pain.    She notes intermittent right hip area pain.    The patient notes intermittent mid back pain, controlled, without radicular component.    She has had prior cervical spine surgery thrValery Minaya, controlled.    She has had prior treatment with medications. She has tried therapy/exercise. She has tried modalities. No bowel/bladder dysfunction noted. No overt lower limb weakness noted. She is making an effort with home exercise program. She has been able to improve her function. She is pleased by the progress to date.      MEDICAL RECORDS REVIEW/DATA REVIEW: Reviewed in epic.    I reviewed medications. Tolerating baclofen and gabapentin, with benefit.    I reviewed diagnostic studies:     I reviewed radiographs.     Reviewed MRI lumbar spine 12/2016. Reviewed lumbar spine x-rays 12/2016.     Reviewed thoracic spine x-rays 7/2017, showed mild degenerative disc disease.     Reviewed right hip x-rays 7/2017, showed mild bilateral hip arthritis.     Reviewed cervical spine x-rays 1/2013.    I reviewed lab studies. Reviewed CMP 12/2016. A1C 12/2016 of 5.2.    I reviewed medical issues. Followed by primary care provider, records reviewed.    I reviewed family history: No neuromuscular disorders noted.    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      PAST MEDICAL HISTORY:   Past Medical History:   Diagnosis Date   • Active smoker 4/15/2013   • Arthritis    • Chronic back pain greater than 3 months duration 3/23/2015   • Hypertension    • Hypertension 4/15/2013   • Varicose vein        PAST SURGICAL HISTORY:    Past Surgical History:   Procedure Laterality Date   • CERVICAL FUSION POSTERIOR  12/27/2011    Performed by ROBERT SIGALA at SURGERY [facility] ORS   • CERVICAL LAMINECTOMY POSTERIOR  12/27/2011    Performed by ROBERT SIGALA at SURGERY [facility] ORS   • OTHER ORTHOPEDIC SURGERY  1968    right  arm orif       ALLERGIES:  Review of patient's allergies indicates no known allergies.    MEDICATIONS:    Outpatient Encounter Prescriptions as of 4/24/2018   Medication Sig Dispense Refill   • ibuprofen (MOTRIN) 400 MG Tab Take 400 mg by mouth every 6 hours as needed.     • gabapentin (NEURONTIN) 300 MG Cap Take 1 Cap by mouth 3 times a day. as needed for nerve pain 90 Cap 2   • baclofen (LIORESAL) 10 MG Tab Take 1/2  to 1 tablet by mouth three times per day as needed for muscle spasm 90 Tab 2   • amlodipine (NORVASC) 5 MG Tab TAKE 1 TABLET BY MOUTH EVERY DAY 90 Tab 0   • lisinopril (PRINIVIL) 20 MG Tab TAKE 1 TABLET BY MOUTH EVERY DAY. 90 Tab 1   • acetaminophen (TYLENOL) 500 MG Tab Take 500-1,000 mg by mouth every 6 hours as needed.     • Multiple Vitamins-Minerals (ONE-A-DAY 50 PLUS PO) Take  by mouth every day.     • [DISCONTINUED] gabapentin (NEURONTIN) 300 MG Cap Take 1 Cap by mouth 3 times a day. as needed for nerve pain 60 Cap 1   • [DISCONTINUED] baclofen (LIORESAL) 10 MG Tab Take 1/2  to 1 tablet by mouth three times per day as needed for muscle spasm 60 Tab 1     No facility-administered encounter medications on file as of 4/24/2018.        SOCIAL HISTORY:    Social History     Social History   • Marital status:      Spouse name: N/A   • Number of children: N/A   • Years of  "education: N/A     Social History Main Topics   • Smoking status: Former Smoker     Packs/day: 1.00     Years: 31.00     Types: Cigarettes     Quit date: 7/14/2015   • Smokeless tobacco: Never Used      Comment: electronic cigarette   • Alcohol use 0.0 oz/week      Comment: 3-4  beer q week   • Drug use: No   • Sexual activity: Yes     Partners: Male      Comment:      Other Topics Concern   •  Service No   • Blood Transfusions No   • Caffeine Concern No   • Occupational Exposure No   • Hobby Hazards Yes     motorcycle riding    • Sleep Concern Yes     pain   • Stress Concern Yes   • Weight Concern Yes   • Special Diet No   • Back Care Yes   • Exercise Yes     tries    • Bike Helmet No   • Seat Belt Yes   • Self-Exams Yes     Social History Narrative   • No narrative on file       Review of Systems   Constitutional: Negative for fever and chills.   HENT: Negative for hearing loss and tinnitus.    Eyes: Negative for blurred vision and double vision.   Respiratory: Negative for cough and hemoptysis.    Cardiovascular: Negative for chest pain and palpitations.   Gastrointestinal: Negative for nausea and vomiting.   Genitourinary: Negative for urgency and frequency.   Musculoskeletal: Positive for myalgias, back pain and joint pain.   Skin: Negative.    Neurological: Positive for tingling and sensory change.   Endo/Heme/Allergies: Negative.    All other systems reviewed and are negative.        Objective:     /80   Pulse 65   Temp 36.5 °C (97.7 °F)   Ht 1.676 m (5' 6\")   Wt 85.7 kg (189 lb)   SpO2 100%   BMI 30.51 kg/m²      Physical Exam  Constitutional: oriented to person, place, and time, appears well-developed and well-nourished.   HEENT: Normocephalic atraumatic, neck supple, no JVD noted, no masses noted, no meningeal signs noted  Lymphadenopathy: no cervical, supraclavicular, or inguinal lymphadenopathy noted  Cardiovascular: Intact distal pulses, including at ankles, no limb swelling " noted  Pulmonary: No tachypnea noted, no accessory muscle use noted, no dyspnea noted  Abdominal: Soft, nontender, exhibits no distension, no peritoneal signs, no HSM  Musculoskeletal:   Right hip: exhibits only mild  Tenderness, only mild pain with range of motion testing  Left hip: exhibits only mild tenderness. Minimal pain with range of motion testing  Lumbar back: Pain with transitions, exhibits decreased range of motion, tenderness and pain. straight leg testing negative, trigger points noted right lumbosacral region, tender right sacroiliac joint region, pain noted with quadrant loading and extension to the right, scoliosis noted  Thoracic: Only mild tender with palpation lower thoracic region, mild pain with range of motion testing, scoliosis noted  Neurological: oriented to person, place, and time. Cranial nerves grossly intact, normal strength. Sensation intact distally. Reflexes 1+ in lower limbs, Gait mildly antalgic, reciprocal, No upper motor neuron signs evident  Skin: Skin is intact. no rashes or lesions noted  Psychiatric: normal mood and affect. speech is normal and behavior is normal. Judgment and thought content normal. Cognition and memory are normal.         Procedure note: Written/informed consent was obtained, risks benefits and alternatives discussed, and all questions were answered. The patient was placed prone on the exam table and 3 trigger points in the right lumbosacral region were marked, then sterilely prepared. Then using a 25-gauge needle, trigger point injections were performed with injection of 1-2 cc of a mixture of 1 cc of Depo-Medrol 40 mg/cc and 5 cc of 1% lidocaine at each site. The patient tolerated the procedure well. There were no complications.        Assessment/Plan:       ASSESSMENT:    1. Flare of lumbosacral pain, myofascial pain, lumbar radiculitis, foraminal stenosis, listhesis, degenerative disc disease, facet arthropathy, lumbar spondylosis, scoliosis    - I  reviewed post procedure precautions    2. Mid back pain, myofascial pain, degenerative disc disease, spondylosis, H/O Scheuermann disease, scoliosis, symptomatically controlled    3. Right hip pain, sprain strain, bursitis/tendinopathy, mild bilateral hip arthritis, relatively controlled    4. History of cervical spine surgery, symptomatically controlled    5. Comorbid medical issues, with care per primary care provider      DISCUSSION/PLAN:    - I discussed management options. I reviewed symptomatic care    - I reviewed home exercise program, with medical precautions. I reviewed activity modification    - The patient can consider complementary trials with acupuncture, massage therapy, or TENS unit    - I reviewed medication monitoring.  For now, continue current medications. I reviewed medication adjustments, updated prescriptions for gabapentin and baclofen. I reviewed risks, side effects, and interactions of medications, including over-the-counter medications. I reviewed further symptomatic medications.    - I reviewed additional diagnostic options, including further/advanced imaging, including lumbar spine flexion/extension radiograph, electrodiagnostic testing, vascular studies, and further lab screening    - I reviewed additional therapeutic options, including further injection/interventional therapy and additional consultative input.    - I reviewed psychosocial interventions    - Return in 4-6 weeks or an as-needed basis.      Please note that this dictation was created using voice recognition software. I have made every reasonable attempt to correct obvious errors but there may be errors of grammar and content that I may have overlooked prior to finalization of this note.

## 2018-04-25 ENCOUNTER — PATIENT MESSAGE (OUTPATIENT)
Dept: MEDICAL GROUP | Facility: PHYSICIAN GROUP | Age: 58
End: 2018-04-25

## 2018-04-25 ENCOUNTER — HOSPITAL ENCOUNTER (EMERGENCY)
Facility: MEDICAL CENTER | Age: 58
End: 2018-04-25
Attending: EMERGENCY MEDICINE
Payer: COMMERCIAL

## 2018-04-25 VITALS
SYSTOLIC BLOOD PRESSURE: 126 MMHG | WEIGHT: 189.6 LBS | OXYGEN SATURATION: 94 % | DIASTOLIC BLOOD PRESSURE: 91 MMHG | HEART RATE: 66 BPM | BODY MASS INDEX: 30.6 KG/M2 | TEMPERATURE: 99.5 F | RESPIRATION RATE: 16 BRPM

## 2018-04-25 DIAGNOSIS — G89.29 CHRONIC BACK PAIN GREATER THAN 3 MONTHS DURATION: ICD-10-CM

## 2018-04-25 DIAGNOSIS — M54.41 ACUTE MIDLINE LOW BACK PAIN WITH RIGHT-SIDED SCIATICA: ICD-10-CM

## 2018-04-25 DIAGNOSIS — M54.9 CHRONIC BACK PAIN, UNSPECIFIED BACK LOCATION, UNSPECIFIED BACK PAIN LATERALITY: ICD-10-CM

## 2018-04-25 DIAGNOSIS — M54.9 CHRONIC BACK PAIN GREATER THAN 3 MONTHS DURATION: ICD-10-CM

## 2018-04-25 DIAGNOSIS — G89.29 CHRONIC BACK PAIN, UNSPECIFIED BACK LOCATION, UNSPECIFIED BACK PAIN LATERALITY: ICD-10-CM

## 2018-04-25 PROCEDURE — 96372 THER/PROPH/DIAG INJ SC/IM: CPT

## 2018-04-25 PROCEDURE — 99284 EMERGENCY DEPT VISIT MOD MDM: CPT

## 2018-04-25 PROCEDURE — 700111 HCHG RX REV CODE 636 W/ 250 OVERRIDE (IP): Performed by: EMERGENCY MEDICINE

## 2018-04-25 RX ADMIN — HYDROMORPHONE HYDROCHLORIDE 1 MG: 10 INJECTION, SOLUTION INTRAMUSCULAR; INTRAVENOUS; SUBCUTANEOUS at 19:33

## 2018-04-25 ASSESSMENT — PAIN SCALES - GENERAL: PAINLEVEL_OUTOF10: 10

## 2018-04-25 NOTE — TELEPHONE ENCOUNTER
----- Message from Jayasrhee Anthony sent at 4/25/2018 12:13 PM PDT -----  Regarding: Non-Urgent Medical Question  Contact: [phone]  Rickey Vance,  I am requesting a referral to [facility] asap.  I am in extreme pain and the shots from Pain Management is no longer really doing it for me.  I would like to have them take a look at my situation.  I am to the point that driving a car is painful.  Miriam Minaya had done my neck surgery also 6yrs ago or so.    thanks  Jayashree

## 2018-04-26 RX ORDER — LISINOPRIL 20 MG/1
TABLET ORAL
Qty: 90 TAB | Refills: 0 | Status: SHIPPED | OUTPATIENT
Start: 2018-04-26 | End: 2018-07-23 | Stop reason: SDUPTHER

## 2018-04-26 NOTE — DISCHARGE INSTRUCTIONS
Back Pain, Adult  Back pain is very common in adults. The cause of back pain is rarely dangerous and the pain often gets better over time. The cause of your back pain may not be known. Some common causes of back pain include:  · Strain of the muscles or ligaments supporting the spine.  · Wear and tear (degeneration) of the spinal disks.  · Arthritis.  · Direct injury to the back.  For many people, back pain may return. Since back pain is rarely dangerous, most people can learn to manage this condition on their own.  Follow these instructions at home:  Watch your back pain for any changes. The following actions may help to lessen any discomfort you are feeling:  · Remain active. It is stressful on your back to sit or  one place for long periods of time. Do not sit, drive, or  one place for more than 30 minutes at a time. Take short walks on even surfaces as soon as you are able. Try to increase the length of time you walk each day.  · Exercise regularly as directed by your health care provider. Exercise helps your back heal faster. It also helps avoid future injury by keeping your muscles strong and flexible.  · Do not stay in bed. Resting more than 1-2 days can delay your recovery.  · Pay attention to your body when you bend and lift. The most comfortable positions are those that put less stress on your recovering back. Always use proper lifting techniques, including:  ¨ Bending your knees.  ¨ Keeping the load close to your body.  ¨ Avoiding twisting.  · Find a comfortable position to sleep. Use a firm mattress and lie on your side with your knees slightly bent. If you lie on your back, put a pillow under your knees.  · Avoid feeling anxious or stressed. Stress increases muscle tension and can worsen back pain. It is important to recognize when you are anxious or stressed and learn ways to manage it, such as with exercise.  · Take medicines only as directed by your health care provider.  Over-the-counter medicines to reduce pain and inflammation are often the most helpful. Your health care provider may prescribe muscle relaxant drugs. These medicines help dull your pain so you can more quickly return to your normal activities and healthy exercise.  · Apply ice to the injured area:  ¨ Put ice in a plastic bag.  ¨ Place a towel between your skin and the bag.  ¨ Leave the ice on for 20 minutes, 2-3 times a day for the first 2-3 days. After that, ice and heat may be alternated to reduce pain and spasms.  · Maintain a healthy weight. Excess weight puts extra stress on your back and makes it difficult to maintain good posture.  Contact a health care provider if:  · You have pain that is not relieved with rest or medicine.  · You have increasing pain going down into the legs or buttocks.  · You have pain that does not improve in one week.  · You have night pain.  · You lose weight.  · You have a fever or chills.  Get help right away if:  · You develop new bowel or bladder control problems.  · You have unusual weakness or numbness in your arms or legs.  · You develop nausea or vomiting.  · You develop abdominal pain.  · You feel faint.  This information is not intended to replace advice given to you by your health care provider. Make sure you discuss any questions you have with your health care provider.  Document Released: 12/18/2006 Document Revised: 04/27/2017 Document Reviewed: 04/21/2015  [employer] Interactive Patient Education © 2017 [employer]      Chronic Back Pain  When back pain lasts longer than 3 months, it is called chronic back pain. The cause of your back pain may not be known. Some common causes include:  · Wear and tear (degenerative disease) of the bones, ligaments, or disks in your back.  · Inflammation and stiffness in your back (arthritis).  People who have chronic back pain often go through certain periods in which the pain is more intense (flare-ups). Many people can learn to manage  the pain with home care.  Follow these instructions at home:  Pay attention to any changes in your symptoms. Take these actions to help with your pain:  Activity  · Avoid bending and activities that make the problem worse.  · Do not sit or  one place for long periods of time.  · Take brief periods of rest throughout the day. This will reduce your pain. Resting in a lying or standing position is usually better than sitting to rest.  · When you are resting for longer periods, mix in some mild activity or stretching between periods of rest. This will help to prevent stiffness and pain.  · Get regular exercise. Ask your health care provider what activities are safe for you.  · Do not lift anything that is heavier than 10 lb (4.5 kg). Always use proper lifting technique, which includes:  ¨ Bending your knees.  ¨ Keeping the load close to your body.  ¨ Avoiding twisting.  Managing pain  · If directed, apply ice to the painful area. Your health care provider may recommend applying ice during the first 24-48 hours after a flare-up begins.  ¨ Put ice in a plastic bag.  ¨ Place a towel between your skin and the bag.  ¨ Leave the ice on for 20 minutes, 2-3 times per day.  · After icing, apply heat to the affected area as often as told by your health care provider. Use the heat source that your health care provider recommends, such as a moist heat pack or a heating pad.  ¨ Place a towel between your skin and the heat source.  ¨ Leave the heat on for 20-30 minutes.  ¨ Remove the heat if your skin turns bright red. This is especially important if you are unable to feel pain, heat, or cold. You may have a greater risk of getting burned.  · Try soaking in a warm tub.  · Take over-the-counter and prescription medicines only as told by your health care provider.  · Keep all follow-up visits as told by your health care provider. This is important.  Contact a health care provider if:  · You have pain that is not relieved with  rest or medicine.  Get help right away if:  · You have weakness or numbness in one or both of your legs or feet.  · You have trouble controlling your bladder or your bowels.  · You have nausea or vomiting.  · You have pain in your abdomen.  · You have shortness of breath or you faint.  This information is not intended to replace advice given to you by your health care provider. Make sure you discuss any questions you have with your health care provider.  Document Released: 01/25/2006 Document Revised: 04/27/2017 Document Reviewed: 06/06/2016  [employer] Interactive Patient Education © 2017 [employer]      Sciatica  Sciatica is pain, numbness, weakness, or tingling along the path of the sciatic nerve. The sciatic nerve starts in the lower back and runs down the back of each leg. The nerve controls the muscles in the lower leg and in the back of the knee. It also provides feeling (sensation) to the back of the thigh, the lower leg, and the sole of the foot. Sciatica is a symptom of another medical condition that pinches or puts pressure on the sciatic nerve.  Generally, sciatica only affects one side of the body. Sciatica usually goes away on its own or with treatment. In some cases, sciatica may keep coming back (recur).  What are the causes?  This condition is caused by pressure on the sciatic nerve, or pinching of the sciatic nerve. This may be the result of:  · A disk in between the bones of the spine (vertebrae) bulging out too far (herniated disk).  · Age-related changes in the spinal disks (degenerative disk disease).  · A pain disorder that affects a muscle in the buttock (piriformis syndrome).  · Extra bone growth (bone spur) near the sciatic nerve.  · An injury or break (fracture) of the pelvis.  · Pregnancy.  · Tumor (rare).  What increases the risk?  The following factors may make you more likely to develop this condition:  · Playing sports that place pressure or stress on the spine, such as football or  weight lifting.  · Having poor strength and flexibility.  · A history of back injury.  · A history of back surgery.  · Sitting for long periods of time.  · Doing activities that involve repetitive bending or lifting.  · Obesity.  What are the signs or symptoms?  Symptoms can vary from mild to very severe, and they may include:  · Any of these problems in the lower back, leg, hip, or buttock:  ¨ Mild tingling or dull aches.  ¨ Burning sensations.  ¨ Sharp pains.  · Numbness in the back of the calf or the sole of the foot.  · Leg weakness.  · Severe back pain that makes movement difficult.  These symptoms may get worse when you cough, sneeze, or laugh, or when you sit or stand for long periods of time. Being overweight may also make symptoms worse. In some cases, symptoms may recur over time.  How is this diagnosed?  This condition may be diagnosed based on:  · Your symptoms.  · A physical exam. Your health care provider may ask you to do certain movements to check whether those movements trigger your symptoms.  · You may have tests, including:  ¨ Blood tests.  ¨ X-rays.  ¨ MRI.  ¨ CT scan.  How is this treated?  In many cases, this condition improves on its own, without any treatment. However, treatment may include:  · Reducing or modifying physical activity during periods of pain.  · Exercising and stretching to strengthen your abdomen and improve the flexibility of your spine.  · Icing and applying heat to the affected area.  · Medicines that help:  ¨ To relieve pain and swelling.  ¨ To relax your muscles.  · Injections of medicines that help to relieve pain, irritation, and inflammation around the sciatic nerve (steroids).  · Surgery.  Follow these instructions at home:  Medicines  · Take over-the-counter and prescription medicines only as told by your health care provider.  · Do not drive or operate heavy machinery while taking prescription pain medicine.  Managing pain  · If directed, apply ice to the affected  area.  ¨ Put ice in a plastic bag.  ¨ Place a towel between your skin and the bag.  ¨ Leave the ice on for 20 minutes, 2-3 times a day.  · After icing, apply heat to the affected area before you exercise or as often as told by your health care provider. Use the heat source that your health care provider recommends, such as a moist heat pack or a heating pad.  ¨ Place a towel between your skin and the heat source.  ¨ Leave the heat on for 20-30 minutes.  ¨ Remove the heat if your skin turns bright red. This is especially important if you are unable to feel pain, heat, or cold. You may have a greater risk of getting burned.  Activity  · Return to your normal activities as told by your health care provider. Ask your health care provider what activities are safe for you.  ¨ Avoid activities that make your symptoms worse.  · Take brief periods of rest throughout the day. Resting in a lying or standing position is usually better than sitting to rest.  ¨ When you rest for longer periods, mix in some mild activity or stretching between periods of rest. This will help to prevent stiffness and pain.  ¨ Avoid sitting for long periods of time without moving. Get up and move around at least one time each hour.  · Exercise and stretch regularly, as told by your health care provider.  · Do not lift anything that is heavier than 10 lb (4.5 kg) while you have symptoms of sciatica. When you do not have symptoms, you should still avoid heavy lifting, especially repetitive heavy lifting.  · When you lift objects, always use proper lifting technique, which includes:  ¨ Bending your knees.  ¨ Keeping the load close to your body.  ¨ Avoiding twisting.  General instructions  · Use good posture.  ¨ Avoid leaning forward while sitting.  ¨ Avoid hunching over while standing.  · Maintain a healthy weight. Excess weight puts extra stress on your back and makes it difficult to maintain good posture.  · Wear supportive, comfortable shoes. Avoid  wearing high heels.  · Avoid sleeping on a mattress that is too soft or too hard. A mattress that is firm enough to support your back when you sleep may help to reduce your pain.  · Keep all follow-up visits as told by your health care provider. This is important.  Contact a health care provider if:  · You have pain that wakes you up when you are sleeping.  · You have pain that gets worse when you lie down.  · Your pain is worse than you have experienced in the past.  · Your pain lasts longer than 4 weeks.  · You experience unexplained weight loss.  Get help right away if:  · You lose control of your bowel or bladder (incontinence).  · You have:  ¨ Weakness in your lower back, pelvis, buttocks, or legs that gets worse.  ¨ Redness or swelling of your back.  ¨ A burning sensation when you urinate.  This information is not intended to replace advice given to you by your health care provider. Make sure you discuss any questions you have with your health care provider.  Document Released: 12/12/2002 Document Revised: 05/23/2017 Document Reviewed: 08/26/2016  [employer] Interactive Patient Education © 2017 [employer]

## 2018-04-26 NOTE — ED TRIAGE NOTES
"Jayashree Anthony    Chief Complaint   Patient presents with   • Low Back Pain     radiates to groin and to calf       Pt ambulatory to triage with above complaint. Pt states pain has been increasing the last 4 days, pt received cortisone shot Tuesday with no relief.   Pt states pain starts lower back and shoots down into groin then to leg.  Right calf pain describes as \"someone sitting on it\"  No swelling, redness noted. Denies numbness or tingling.  VSS.  Pt returned to lobby, educated on triage process, and to inform staff of any changes or concerns.    Blood Pressure: 115/88, Pulse: 84, Respiration: 16, Temperature: 37.5 °C (99.5 °F), Weight: 86 kg (189 lb 9.5 oz), Pulse Oximetry: 95 %, O2 Delivery: None (Room Air)    "

## 2018-04-26 NOTE — ED NOTES
Pt ambulatory  Vital signs stable  Pt handed d/c paperwork with understanding stated  Pt states will follow up with PCP and specialist  Pt states safe way home

## 2018-04-26 NOTE — ED PROVIDER NOTES
ED Provider Note    Scribed for Ta Kendall D.O. by Gavin Grady. 4/25/2018  6:58 PM    Primary care provider: ZACH Dolan   History obtained from: Patient and   History limited by: None     CHIEF COMPLAINT  Chief Complaint   Patient presents with   • Low Back Pain     radiates to groin and to calf        HPI    Jayashree Anthony is a 58 y.o. Female, with a history of stenosis scoliosis, who presents to the ED for lower back pain worsening over the last 4 days. Patient describes pain in her lower back which shoots down to her groin and right leg. Her pain can be so severe at times that she experiences nausea.   Patient confirms a history of chronic back pain which she can usually treat on her own, but home remedies have not helped. She has already treated herself with a variety of over the counter medications including Ibuprofen and Tylenol which have offered no relief. She also received a cortisone injection into her back yesterday with which was also ineffective for treatment.  She has been prescribed Norco in the past secondary to her chronic back pain but has recently discontinued use.   Patient denies trauma, fever, dysuria.    Patient denies any recent injury/trauma. She received a referral to neurosurgery daily that came to the ED because of pain that is not controlled despite her usual medications. There is no incontinence/saddle anesthesia.      REVIEW OF SYSTEMS  Please see HPI for pertinent positives/negatives. E.    PAST MEDICAL HISTORY  Past Medical History:   Diagnosis Date   • Chronic back pain greater than 3 months duration 3/23/2015   • Hypertension 4/15/2013   • Active smoker 4/15/2013   • Arthritis    • Hypertension    • Scoliosis    • Varicose vein         SURGICAL HISTORY  Past Surgical History:   Procedure Laterality Date   • CERVICAL FUSION POSTERIOR  12/27/2011    Performed by ROBERT SIGALA at SURGERY [facility]   • CERVICAL LAMINECTOMY POSTERIOR  12/27/2011     Performed by ROBERT SIGAAL at SURGERY [facility]   • OTHER ORTHOPEDIC SURGERY  1968    right  arm orif        SOCIAL HISTORY  Social History     Social History Main Topics   • Smoking status: Former Smoker     Packs/day: 1.00     Years: 31.00     Types: Cigarettes     Quit date: 7/14/2015   • Smokeless tobacco: Never Used      Comment: electronic cigarette   • Alcohol use 0.0 oz/week      Comment: 3-4  beer q week   • Drug use: No   • Sexual activity: Yes     Partners: Male      Comment:         FAMILY HISTORY  Family History   Problem Relation Age of Onset   • Hyperlipidemia Mother    • Heart Disease Mother    • Diabetes Mother    • Thyroid Mother    • Hyperlipidemia Father    • Cancer Father      leukemia due to chemical exposure   • Thyroid Sister    • Stroke Neg Hx         CURRENT MEDICATIONS    Current Outpatient Prescriptions on File Prior to Encounter   Medication Sig Dispense Refill   • ibuprofen (MOTRIN) 400 MG Tab Take 400 mg by mouth every 6 hours as needed.     • gabapentin (NEURONTIN) 300 MG Cap Take 1 Cap by mouth 3 times a day. as needed for nerve pain 90 Cap 2   • baclofen (LIORESAL) 10 MG Tab Take 1/2  to 1 tablet by mouth three times per day as needed for muscle spasm 90 Tab 2   • amlodipine (NORVASC) 5 MG Tab TAKE 1 TABLET BY MOUTH EVERY DAY 90 Tab 0   • lisinopril (PRINIVIL) 20 MG Tab TAKE 1 TABLET BY MOUTH EVERY DAY. 90 Tab 1   • acetaminophen (TYLENOL) 500 MG Tab Take 500-1,000 mg by mouth every 6 hours as needed.     • Multiple Vitamins-Minerals (ONE-A-DAY 50 PLUS PO) Take  by mouth every day.          ALLERGIES  No Known Allergies     PHYSICAL EXAM  VITAL SIGNS: /88   Pulse 84   Temp 37.5 °C (99.5 °F)   Resp 16   Wt 86 kg (189 lb 9.5 oz)   SpO2 95%   BMI 30.60 kg/m²  @JERRICA[[id number]::@     Pulse ox interpretation:95% I interpret this pulse ox as normal     Constitutional: Well developed, well nourished, alert in no apparent distress, nontoxic appearance    HENT: No  external signs of trauma, normocephalic, oropharynx moist and clear, nose normal   Eyes: PERRL, conjunctiva without erythema, no discharge, no icterus    Neck: Soft and supple, trachea midline, no stridor, no tenderness, no LAD, no JVD, good ROM    Cardiovascular: Regular rate and rhythm, no murmurs/rubs/gallops, strong distal pulses and good perfusion    Thorax & Lungs: No respiratory distress, CTAB  Abdomen: Soft, nontender, nondistended, no guarding, no rebound, normal BS    Back: No CVAT, mid lumbar tenderness to palpation, straight leg raising negative bilaterally, 5/5 strength equal bilateral lower extremities, sensation intact to touch throughout, DTRs 2/4 and equal bilateral lower extremities   Extremities: No cyanosis, no edema, no gross deformity, good ROM, no tenderness, intact distal pulses with brisk cap refill    Skin: Warm, dry, no pallor/cyanosis, no rash noted    Lymphatic: No lymphadenopathy noted    Neuro: A/O times 3, no focal deficits noted    Psychiatric: Cooperative, normal mood and affect, normal judgement, appropriate for clinical situation          DIAGNOSTIC STUDIES / PROCEDURES        LABS  All labs reviewed by me.     Results for orders placed or performed during the hospital encounter of 04/05/17   PAIN MANAGEMENT PANEL, SCRN W/ RFLX TO QNT   Result Value Ref Range    Urine Amphetamine-Methamphetam Negative Cutoff 300 ng/mL    Barbiturates Negative Cutoff 200 ng/mL    Benzodiazepines Negative Cutoff 200 ng/mL    Buprenorphine, Urn, Scrn Negative Cutoff 5 ng/mL    Carisoprodol, Urn, Scrn Negative Cutoff 100 ng/mL    Cocaine Metabolite Negative Cutoff 150 ng/mL    Ethyl Glucuronide Scrn, Urine Negative Cutoff 500 ng/mL    Fentanyl, Urn, Scrn Negative Cutoff 2 ng/mL    Meperidine, Urn, Scrn Negative Cutoff 200 ng/mL    Methadone Negative Cutoff 150 ng/mL    Opiates, Urn, Scrn Positive Cutoff 300 ng/mL    Oxycodone/Oxymorphone, Urn, Scrn Negative Cutoff 100 ng/mL    Phencyclidine -Pcp  Negative Cutoff 25 ng/mL    Propoxyphene Negative Cutoff 300 ng/mL    Tapentadol, Urn, Scrn Negative Cutoff 200 ng/mL    Cannabinoid Metab Negative Cutoff 20 ng/mL    Tramadol, Urn, Scrn Negative Cutoff 200 ng/mL    Zolpidem, Urn, Scrn Negative Cutoff 20 ng/mL    Scrn, Urine Interpretation See Note    AMBIGUOUS DATE/TIME   Result Value Ref Range    Ambiguous Date/Time See Below:    OPIATE CONFIRMATION   Result Value Ref Range    Opiates, Ur, Hydrocodone 128 ng/mL    Opiates, Ur, Hydromorphone <20 ng/mL    Opiates, Ur, Codeine <20 ng/mL    Opiates, Ur, Morphine <20 ng/mL    Opiates, Ur, 6_AM <10 ng/mL    Opiates, Ur, Noroxycodone <20 ng/mL    Opiates, Ur, Noroxymorphone <20 ng/mL    Opiates, Ur, Oxycodone <20 ng/mL    Opiates, Ur, Oxymorphone <20 ng/mL    Opiates, Ur, Norhydrocodone 338 ng/mL        RADIOLOGY  The radiologist's interpretation of all radiological studies have been reviewed by me.     No orders to display          COURSE & MEDICAL DECISION MAKING  Nursing notes, VS, PMSFHx reviewed in chart.     Review of past medical records shows the patientWas seen yesterday and receive steroid injections to her lower back. She received a referral to neurosurgery today.    Differential diagnoses considered include but are not limited to: Strain/sprain, DDD, herniated disc, compression Fx, spinal stenosis, radiculopathy, sciatica       7:09 PM Patient seen at bedside. Informed her that she can receive medication for pain, but urged prompt follow up for further evaluation. Patient is stable for discharge at this time. Discussed return precautions and follow up if symptoms do not improve. She agrees to the plan of care.  Patient was treated with Dilaudid 1 mg IM prior to discharge.       Patient presents with  to the ED with above complaint. This is a pleasant well-appearing patient in no acute distress and nontoxic in appearance with apparent flareup of her chronic low back pain. No signs of acute cord  syndrome/cauda equina or significant red flags to indicate a more serious pathology. Patient was given Dilaudid in the ED. She was advised on outpatient follow-up and given return to ED precautions. She verbalized understanding and agreed with plan of care with no further questions or concerns.      The patient is referred to a primary physician for blood pressure management, diabetic screening, and for all other preventative health concerns.       FINAL IMPRESSION  1. Acute midline low back pain with right-sided sciatica           DISPOSITION  Patient will be discharged home in stable condition.       FOLLOW UP  JESSICA Dolan.  [address]  [address]  [facility] [id number] [phone]    Call in 1 day      [facility], Emergency Dept  [address] [address] [phone]    If symptoms worsen         OUTPATIENT MEDICATIONS  New Prescriptions    No medications on file           Gavin AZEVEDO (Sashaibe), am scribing for, and in the presence of, Ta Kendall D.O..    Electronically signed by: Gavin Grady (Irwin), 4/25/2018    ITa D.O. personally performed the services described in this documentation, as scribed by Gavin Grady in my presence, and it is both accurate and complete.      Portions of this record were made with voice recognition software and by scribes.  Despite my review, spelling/grammar/context errors may still remain.  Interpretation of this chart should be taken in this context.

## 2018-04-26 NOTE — TELEPHONE ENCOUNTER
Was the patient seen in the last year in this department? Yes     Does patient have an active prescription for medications requested? No     Received Request Via: Pharmacy      Pt met protocol?: Yes, OV 1/18   BP Readings from Last 1 Encounters:   04/25/18 126/91

## 2018-04-26 NOTE — ED TRIAGE NOTES
Chief Complaint   Patient presents with   • Low Back Pain     radiates to groin and to calf     Agree with triage rn.  Pt medicated per MAR.

## 2018-05-04 RX ORDER — LISINOPRIL 20 MG/1
TABLET ORAL
Refills: 0 | OUTPATIENT
Start: 2018-05-04

## 2018-05-04 RX ORDER — AMLODIPINE BESYLATE 5 MG/1
TABLET ORAL
Qty: 90 TAB | Refills: 0 | Status: SHIPPED | OUTPATIENT
Start: 2018-05-04 | End: 2018-08-03 | Stop reason: SDUPTHER

## 2018-05-04 NOTE — TELEPHONE ENCOUNTER
Lisinopril requested to soon last 90 sent 4/26/18  Was the patient seen in the last year in this department? Yes     Does patient have an active prescription for medications requested? No     Received Request Via: Pharmacy      Pt met protocol?: Yes pt last ov 1/2018   BP Readings from Last 1 Encounters:   04/25/18 126/91

## 2018-05-29 ENCOUNTER — OFFICE VISIT (OUTPATIENT)
Dept: DERMATOLOGY | Facility: IMAGING CENTER | Age: 58
End: 2018-05-29
Payer: COMMERCIAL

## 2018-05-29 VITALS — WEIGHT: 185 LBS | BODY MASS INDEX: 29.73 KG/M2 | HEIGHT: 66 IN | TEMPERATURE: 98.5 F

## 2018-05-29 DIAGNOSIS — L30.9 HAND DERMATITIS: ICD-10-CM

## 2018-05-29 DIAGNOSIS — L98.9 DISORDER OF THE SKIN AND SUBCUTANEOUS TISSUE, UNSPECIFIED: ICD-10-CM

## 2018-05-29 PROCEDURE — 99202 OFFICE O/P NEW SF 15 MIN: CPT | Performed by: DERMATOLOGY

## 2018-05-29 RX ORDER — TRIAMCINOLONE ACETONIDE 1 MG/G
1 CREAM TOPICAL 2 TIMES DAILY
Qty: 45 G | Refills: 0 | Status: SHIPPED | OUTPATIENT
Start: 2018-05-29 | End: 2019-09-18

## 2018-05-29 ASSESSMENT — ENCOUNTER SYMPTOMS
CHILLS: 0
FEVER: 0

## 2018-05-29 NOTE — PROGRESS NOTES
Dermatology New Patient Visit    Chief Complaint   Patient presents with   • Establish Care       Subjective:     HPI:   Jayashree Anthony is a 58 y.o. female presenting for    HPI: Red spot on bridge of nose  Onset: 2 years  Symptoms: red, dry, occasionally gets scaly  Aggravating factors: no  Alleviating factors: no  New creams/topicals: no  New medications (up to last 6 months): baclofen  New travel: no  Other exposures: none  Treatments: OTC oatmeal lotion, never tried any RX creams    HPI: scaling of hands  Onset: 20+ years ago  Symptoms: flaking, mild itching  Aggravating factors: dry weather, water  Alleviating factors: none, just seems to improve on its own  New creams/topicals: none  New medications (up to last 6 months): above  Treatments: OTC hand creams    History of skin cancer: No  History of precancers/actinic keratoses: No  History of biopsies:No  History of blistering/severe sunburns:Yes, Details: on shoulders at age 15.  Family history of skin cancer:Yes, Details: Father precancers on face and back  Family history of atypical moles:No        Past Medical History:   Diagnosis Date   • Active smoker 4/15/2013   • Arthritis    • Chronic back pain greater than 3 months duration 3/23/2015   • Hypertension    • Hypertension 4/15/2013   • Scoliosis    • Varicose vein        Current Outpatient Prescriptions on File Prior to Visit   Medication Sig Dispense Refill   • Misc. Devices Misc Hot tub For alleviating pain and Arthritis discpmfprt 1 Each 0   • amLODIPine (NORVASC) 5 MG Tab TAKE 1 TABLET BY MOUTH EVERY DAY 90 Tab 0   • lisinopril (PRINIVIL) 20 MG Tab TAKE 1 TABLET BY MOUTH EVERY DAY. 90 Tab 0   • ibuprofen (MOTRIN) 400 MG Tab Take 400 mg by mouth every 6 hours as needed.     • gabapentin (NEURONTIN) 300 MG Cap Take 1 Cap by mouth 3 times a day. as needed for nerve pain 90 Cap 2   • baclofen (LIORESAL) 10 MG Tab Take 1/2  to 1 tablet by mouth three times per day as needed for muscle spasm 90 Tab 2    • acetaminophen (TYLENOL) 500 MG Tab Take 500-1,000 mg by mouth every 6 hours as needed.     • Multiple Vitamins-Minerals (ONE-A-DAY 50 PLUS PO) Take  by mouth every day.       No current facility-administered medications on file prior to visit.        No Known Allergies    Family History   Problem Relation Age of Onset   • Hyperlipidemia Mother    • Heart Disease Mother    • Diabetes Mother    • Thyroid Mother    • Hyperlipidemia Father    • Cancer Father      leukemia due to chemical exposure   • Thyroid Sister    • Stroke Neg Hx        Social History     Social History   • Marital status:      Spouse name: N/A   • Number of children: N/A   • Years of education: N/A     Occupational History   • Not on file.     Social History Main Topics   • Smoking status: Former Smoker     Packs/day: 1.00     Years: 31.00     Types: Cigarettes     Quit date: 7/14/2015   • Smokeless tobacco: Never Used      Comment: electronic cigarette   • Alcohol use 0.0 oz/week      Comment: 3-4  beer q week   • Drug use: No   • Sexual activity: Yes     Partners: Male      Comment:      Other Topics Concern   •  Service No   • Blood Transfusions No   • Caffeine Concern No   • Occupational Exposure No   • Hobby Hazards Yes     motorcycle riding    • Sleep Concern Yes     pain   • Stress Concern Yes   • Weight Concern Yes   • Special Diet No   • Back Care Yes   • Exercise Yes     tries    • Bike Helmet No   • Seat Belt Yes   • Self-Exams Yes     Social History Narrative   • No narrative on file       Review of Systems   Constitutional: Negative for chills and fever.   Skin: Positive for itching and rash.   All other systems reviewed and are negative.       Objective:     A focused cutaneous exam was completed including: face, eyelids, conjunctiva, lips, neck, left and right upper extremities (including hands/digits and fingernails) with the following pertinent findings listed below. Remaining above-listed examined areas  "within normal limits / negative for rashes or lesions.    Temperature 36.9 °C (98.5 °F), height 1.676 m (5' 6\"), weight 83.9 kg (185 lb), last menstrual period 12/28/2011, unknown if currently breastfeeding.    Physical Exam   Constitutional: She is oriented to person, place, and time and well-developed, well-nourished, and in no distress.   HENT:   Head: Normocephalic and atraumatic.       Eyes: Conjunctivae and lids are normal.   Neck: Normal range of motion.   Pulmonary/Chest: Effort normal.   Neurological: She is alert and oriented to person, place, and time.   Skin: Skin is warm and dry.        Psychiatric: Mood and affect normal.   Vitals reviewed.      DATA: none applicable to review    Assessment and Plan:     1. Disorder of the skin and subcutaneous tissue, unspecified  Comment: suspect AK, but possible dermatitis  - educated patient about suspected diagnosis, management options, and expectations of treatment  - trial of triamcinolone 0.1% cream to affected area for 3 weeks, if no improvement will treat with cryo. Patient instructed to avoid face, axilla, and groin area. Side effects discussed, including skin thinning, appearance of stretch marks (striae), easy bruising, telangiectasias, and possible increased hair growth     2. Hand dermatitis  - educated patient about diagnosis, management options, and expectations of treatment  - extensively discussed importance of regular moisturization to the affected area during flares, and also for maintenance therapy liberally, several times a day, to protect the skin barrier. OTC moisturizers recommended  - can use triamcinolone 0.1% cream to affected area of the body twice daily prn. Patient instructed to avoid face, axilla, and groin area. Side effects discussed, including skin thinning, appearance of stretch marks (striae), easy bruising, telangiectasias, and possible increased hair growth   - clobetasol in future if needed    Followup: Return in about 3 weeks " (around 6/19/2018) for 15, f/u LN treatment.    Jennifer Smalls M.D.

## 2018-06-06 ENCOUNTER — APPOINTMENT (OUTPATIENT)
Dept: PHYSICAL MEDICINE AND REHAB | Facility: MEDICAL CENTER | Age: 58
End: 2018-06-06
Payer: COMMERCIAL

## 2018-06-08 ENCOUNTER — HOSPITAL ENCOUNTER (OUTPATIENT)
Dept: RADIOLOGY | Facility: MEDICAL CENTER | Age: 58
End: 2018-06-08
Attending: PHYSICIAN ASSISTANT
Payer: COMMERCIAL

## 2018-06-08 DIAGNOSIS — M41.86 OTHER FORMS OF SCOLIOSIS, LUMBAR REGION: ICD-10-CM

## 2018-06-08 PROCEDURE — 72131 CT LUMBAR SPINE W/O DYE: CPT

## 2018-06-26 ENCOUNTER — OFFICE VISIT (OUTPATIENT)
Dept: DERMATOLOGY | Facility: IMAGING CENTER | Age: 58
End: 2018-06-26
Payer: COMMERCIAL

## 2018-06-26 DIAGNOSIS — L57.0 ACTINIC KERATOSES: ICD-10-CM

## 2018-06-26 PROCEDURE — 17000 DESTRUCT PREMALG LESION: CPT | Performed by: DERMATOLOGY

## 2018-06-26 ASSESSMENT — ENCOUNTER SYMPTOMS
CHILLS: 0
FEVER: 0

## 2018-06-26 NOTE — PROGRESS NOTES
Dermatology Return Patient Visit    Chief Complaint   Patient presents with   • Actinic Keratosis       Subjective:     HPI:   Jayashree Anthony is a 58 y.o. female presenting for    Follow up red spot on nose, minimal improvement with the triamcinolone.   Would like move forward with liquid nitrogen Tx today    No new areas of concern     Initial hx:  Onset: 2 years  Symptoms: red, dry, occasionally gets scaly  Aggravating factors: no  Alleviating factors: no  New creams/topicals: no  New medications (up to last 6 months): baclofen  New travel: no  Other exposures: none  Treatments: OTC oatmeal lotion, never tried any RX creams until above    Hand dermatitis - not discussed today    History of skin cancer: No  History of precancers/actinic keratoses: No  History of biopsies:No  History of blistering/severe sunburns:Yes, Details: on shoulders at age 15.  Family history of skin cancer:Yes, Details: Father precancers on face and back  Family history of atypical moles:No        Past Medical History:   Diagnosis Date   • Active smoker 4/15/2013   • Arthritis    • Chronic back pain greater than 3 months duration 3/23/2015   • Hypertension    • Hypertension 4/15/2013   • Scoliosis    • Varicose vein        Current Outpatient Prescriptions on File Prior to Visit   Medication Sig Dispense Refill   • triamcinolone acetonide (KENALOG) 0.1 % Cream Apply 1 Application to affected area(s) 2 times a day. 45 g 0   • Misc. Devices Misc Hot tub For alleviating pain and Arthritis discpmfprt 1 Each 0   • amLODIPine (NORVASC) 5 MG Tab TAKE 1 TABLET BY MOUTH EVERY DAY 90 Tab 0   • lisinopril (PRINIVIL) 20 MG Tab TAKE 1 TABLET BY MOUTH EVERY DAY. 90 Tab 0   • ibuprofen (MOTRIN) 400 MG Tab Take 400 mg by mouth every 6 hours as needed.     • gabapentin (NEURONTIN) 300 MG Cap Take 1 Cap by mouth 3 times a day. as needed for nerve pain 90 Cap 2   • baclofen (LIORESAL) 10 MG Tab Take 1/2  to 1 tablet by mouth three times per day as needed  for muscle spasm 90 Tab 2   • acetaminophen (TYLENOL) 500 MG Tab Take 500-1,000 mg by mouth every 6 hours as needed.     • Multiple Vitamins-Minerals (ONE-A-DAY 50 PLUS PO) Take  by mouth every day.       No current facility-administered medications on file prior to visit.        No Known Allergies    Family History   Problem Relation Age of Onset   • Hyperlipidemia Mother    • Heart Disease Mother    • Diabetes Mother    • Thyroid Mother    • Hyperlipidemia Father    • Cancer Father      leukemia due to chemical exposure   • Thyroid Sister    • Stroke Neg Hx        Social History     Social History   • Marital status:      Spouse name: N/A   • Number of children: N/A   • Years of education: N/A     Occupational History   • Not on file.     Social History Main Topics   • Smoking status: Former Smoker     Packs/day: 1.00     Years: 31.00     Types: Cigarettes     Quit date: 7/14/2015   • Smokeless tobacco: Never Used      Comment: electronic cigarette   • Alcohol use 0.0 oz/week      Comment: 3-4  beer q week   • Drug use: No   • Sexual activity: Yes     Partners: Male      Comment:      Other Topics Concern   •  Service No   • Blood Transfusions No   • Caffeine Concern No   • Occupational Exposure No   • Hobby Hazards Yes     motorcycle riding    • Sleep Concern Yes     pain   • Stress Concern Yes   • Weight Concern Yes   • Special Diet No   • Back Care Yes   • Exercise Yes     tries    • Bike Helmet No   • Seat Belt Yes   • Self-Exams Yes     Social History Narrative   • No narrative on file       Review of Systems   Constitutional: Negative for chills and fever.   All other systems reviewed and are negative.       Objective:     A focused cutaneous exam was completed including: face, eyelids, conjunctiva, lips, neck with the following pertinent findings listed below. Remaining above-listed examined areas within normal limits / negative for rashes or lesions.    Physical Exam   Constitutional:  She is oriented to person, place, and time and well-developed, well-nourished, and in no distress.   HENT:   Head: Normocephalic and atraumatic.       Eyes: Conjunctivae and lids are normal.   Neck: Normal range of motion.   Pulmonary/Chest: Effort normal.   Neurological: She is alert and oriented to person, place, and time.   Skin: Skin is warm and dry.   Psychiatric: Mood and affect normal.       DATA: none applicable to review    Assessment and Plan:     1. Actinic keratosis - nasal bridge  CRYOTHERAPY:  Risks (including, but not limited to: hypo or hyperpigmentation, redness, blister, blood blister, recurrence, need for further treatment, infection, scar) and benefits of cryotherapy discussed. Patient verbally agreed to proceed with treatment. 2 cryotherapy freeze thaw cycles of 10 seconds were applied to 1 lesion on the nose with cryac, then cotton-tipped applicator. Patient tolerated procedure well. Aftercare instructions given.    Followup: Return in about 2 months (around 8/26/2018).    Jennifer Smalls M.D.

## 2018-06-28 ENCOUNTER — HOSPITAL ENCOUNTER (OUTPATIENT)
Dept: RADIOLOGY | Facility: MEDICAL CENTER | Age: 58
End: 2018-06-28
Attending: NEUROLOGICAL SURGERY
Payer: COMMERCIAL

## 2018-06-28 DIAGNOSIS — M85.88 OTHER SPECIFIED DISORDERS OF BONE DENSITY AND STRUCTURE, OTHER SITE: ICD-10-CM

## 2018-06-28 DIAGNOSIS — Z13.820 SCREENING FOR OSTEOPOROSIS: ICD-10-CM

## 2018-06-28 PROCEDURE — 77080 DXA BONE DENSITY AXIAL: CPT

## 2018-07-24 RX ORDER — LISINOPRIL 20 MG/1
TABLET ORAL
Qty: 90 TAB | Refills: 0 | Status: SHIPPED | OUTPATIENT
Start: 2018-07-24 | End: 2018-10-21 | Stop reason: SDUPTHER

## 2018-08-03 ENCOUNTER — HOSPITAL ENCOUNTER (OUTPATIENT)
Facility: MEDICAL CENTER | Age: 58
End: 2018-08-03
Attending: FAMILY MEDICINE
Payer: COMMERCIAL

## 2018-08-03 ENCOUNTER — HOSPITAL ENCOUNTER (OUTPATIENT)
Dept: LAB | Facility: MEDICAL CENTER | Age: 58
End: 2018-08-03
Attending: NURSE PRACTITIONER
Payer: COMMERCIAL

## 2018-08-03 DIAGNOSIS — I10 ESSENTIAL HYPERTENSION: ICD-10-CM

## 2018-08-03 DIAGNOSIS — E87.6 HYPOKALEMIA: ICD-10-CM

## 2018-08-03 DIAGNOSIS — Z13.0 SCREENING FOR DEFICIENCY ANEMIA: ICD-10-CM

## 2018-08-03 LAB
ALBUMIN SERPL BCP-MCNC: 4.4 G/DL (ref 3.2–4.9)
ALBUMIN/GLOB SERPL: 1.7 G/DL
ALP SERPL-CCNC: 61 U/L (ref 30–99)
ALT SERPL-CCNC: 26 U/L (ref 2–50)
ANION GAP SERPL CALC-SCNC: 5 MMOL/L (ref 0–11.9)
AST SERPL-CCNC: 24 U/L (ref 12–45)
BILIRUB SERPL-MCNC: 0.3 MG/DL (ref 0.1–1.5)
BUN SERPL-MCNC: 14 MG/DL (ref 8–22)
CALCIUM SERPL-MCNC: 9.4 MG/DL (ref 8.5–10.5)
CHLORIDE SERPL-SCNC: 103 MMOL/L (ref 96–112)
CHOLEST SERPL-MCNC: 234 MG/DL (ref 100–199)
CO2 SERPL-SCNC: 28 MMOL/L (ref 20–33)
CREAT SERPL-MCNC: 0.91 MG/DL (ref 0.5–1.4)
ERYTHROCYTE [DISTWIDTH] IN BLOOD BY AUTOMATED COUNT: 45.1 FL (ref 35.9–50)
GLOBULIN SER CALC-MCNC: 2.6 G/DL (ref 1.9–3.5)
GLUCOSE SERPL-MCNC: 93 MG/DL (ref 65–99)
HCT VFR BLD AUTO: 42.9 % (ref 37–47)
HDLC SERPL-MCNC: 54 MG/DL
HGB BLD-MCNC: 13.8 G/DL (ref 12–16)
LDLC SERPL CALC-MCNC: 142 MG/DL
MCH RBC QN AUTO: 29.3 PG (ref 27–33)
MCHC RBC AUTO-ENTMCNC: 32.2 G/DL (ref 33.6–35)
MCV RBC AUTO: 91.1 FL (ref 81.4–97.8)
PLATELET # BLD AUTO: 273 K/UL (ref 164–446)
PMV BLD AUTO: 10.8 FL (ref 9–12.9)
POTASSIUM SERPL-SCNC: 4.2 MMOL/L (ref 3.6–5.5)
PROT SERPL-MCNC: 7 G/DL (ref 6–8.2)
RBC # BLD AUTO: 4.71 M/UL (ref 4.2–5.4)
SODIUM SERPL-SCNC: 136 MMOL/L (ref 135–145)
TRIGL SERPL-MCNC: 189 MG/DL (ref 0–149)
WBC # BLD AUTO: 5.3 K/UL (ref 4.8–10.8)

## 2018-08-03 PROCEDURE — 85027 COMPLETE CBC AUTOMATED: CPT

## 2018-08-03 PROCEDURE — 36415 COLL VENOUS BLD VENIPUNCTURE: CPT

## 2018-08-03 PROCEDURE — 80061 LIPID PANEL: CPT

## 2018-08-03 PROCEDURE — 80053 COMPREHEN METABOLIC PANEL: CPT

## 2018-08-03 RX ORDER — AMLODIPINE BESYLATE 5 MG/1
TABLET ORAL
Qty: 90 TAB | Refills: 3 | Status: SHIPPED | OUTPATIENT
Start: 2018-08-03 | End: 2019-07-24 | Stop reason: SDUPTHER

## 2018-08-03 NOTE — TELEPHONE ENCOUNTER
----- Message from Eliseo Guillory sent at 8/3/2018  6:33 AM PDT -----  Regarding: cbcwo   Contact: [phone]  This patient was in this morning for lab work. I drew blood for the cbcwo but it is .to process all her blood work. Can you renew her cbcwo please. Thank you for your time have a good Friday and a blessed weekend.   Olman

## 2018-08-07 DIAGNOSIS — M62.838 MUSCLE SPASM: ICD-10-CM

## 2018-08-07 DIAGNOSIS — M79.2 NERVE PAIN: ICD-10-CM

## 2018-08-07 RX ORDER — BACLOFEN 10 MG/1
TABLET ORAL
Qty: 90 TAB | Refills: 2 | Status: SHIPPED | OUTPATIENT
Start: 2018-08-07 | End: 2019-09-18

## 2018-08-07 RX ORDER — GABAPENTIN 300 MG/1
300 CAPSULE ORAL 3 TIMES DAILY
Qty: 90 CAP | Refills: 2 | Status: SHIPPED | OUTPATIENT
Start: 2018-08-07 | End: 2019-09-18

## 2018-08-07 RX ORDER — GABAPENTIN 300 MG/1
300 CAPSULE ORAL 3 TIMES DAILY
Refills: 2 | OUTPATIENT
Start: 2018-08-07

## 2018-08-07 RX ORDER — BACLOFEN 10 MG/1
TABLET ORAL
Refills: 2 | OUTPATIENT
Start: 2018-08-07

## 2018-08-07 NOTE — TELEPHONE ENCOUNTER
----- Message from Chase Rodriguez M.D. sent at 8/7/2018 11:50 AM PDT -----  Please advise patient that I refilled the gabapentin and baclofen.    Also please schedule the patient for follow-up visit, can be within the next 2 months.

## 2018-08-07 NOTE — TELEPHONE ENCOUNTER
I let Jayashree know Dr. Rodriguez refilled gabapentin and baclofen and she will need fv within the next two months.     She said she may be having surgery within the next two months and will call to schedule at later date.

## 2018-08-07 NOTE — PROGRESS NOTES
Received refill request for baclofen and gabapentin.  The patient was last seen in 4/2018, records reviewed.  Reviewed intercurrent medical issues, medication refilled, schedule the patient for follow-up evaluation.

## 2018-10-22 RX ORDER — LISINOPRIL 20 MG/1
TABLET ORAL
Qty: 90 TAB | Refills: 1 | Status: SHIPPED | OUTPATIENT
Start: 2018-10-22 | End: 2019-04-18 | Stop reason: SDUPTHER

## 2019-07-24 RX ORDER — AMLODIPINE BESYLATE 5 MG/1
TABLET ORAL
Qty: 30 TAB | Refills: 0 | Status: SHIPPED | OUTPATIENT
Start: 2019-07-24 | End: 2019-08-15 | Stop reason: SDUPTHER

## 2019-07-24 NOTE — TELEPHONE ENCOUNTER
Was the patient seen in the last year in this department? No     Does patient have an active prescription for medications requested? No     Received Request Via: Pharmacy    Pt met protocol?: No     Last OV 01/18/18    BP Readings from Last 1 Encounters:   04/25/18 126/91

## 2019-08-15 ENCOUNTER — OFFICE VISIT (OUTPATIENT)
Dept: MEDICAL GROUP | Facility: PHYSICIAN GROUP | Age: 59
End: 2019-08-15
Payer: COMMERCIAL

## 2019-08-15 VITALS
RESPIRATION RATE: 16 BRPM | HEART RATE: 76 BPM | OXYGEN SATURATION: 95 % | BODY MASS INDEX: 30.86 KG/M2 | HEIGHT: 66 IN | DIASTOLIC BLOOD PRESSURE: 62 MMHG | SYSTOLIC BLOOD PRESSURE: 122 MMHG | TEMPERATURE: 97.9 F | WEIGHT: 192 LBS

## 2019-08-15 DIAGNOSIS — Z00.00 WELLNESS EXAMINATION: ICD-10-CM

## 2019-08-15 DIAGNOSIS — Z11.59 SCREENING FOR VIRAL DISEASE: ICD-10-CM

## 2019-08-15 DIAGNOSIS — Z23 NEED FOR VACCINATION: ICD-10-CM

## 2019-08-15 DIAGNOSIS — I10 ESSENTIAL HYPERTENSION: ICD-10-CM

## 2019-08-15 DIAGNOSIS — M54.9 CHRONIC BACK PAIN GREATER THAN 3 MONTHS DURATION: ICD-10-CM

## 2019-08-15 DIAGNOSIS — Z12.39 BREAST CANCER SCREENING: ICD-10-CM

## 2019-08-15 DIAGNOSIS — G89.29 CHRONIC BACK PAIN GREATER THAN 3 MONTHS DURATION: ICD-10-CM

## 2019-08-15 PROCEDURE — 90471 IMMUNIZATION ADMIN: CPT | Performed by: NURSE PRACTITIONER

## 2019-08-15 PROCEDURE — 99214 OFFICE O/P EST MOD 30 MIN: CPT | Mod: 25 | Performed by: NURSE PRACTITIONER

## 2019-08-15 PROCEDURE — 90715 TDAP VACCINE 7 YRS/> IM: CPT | Performed by: NURSE PRACTITIONER

## 2019-08-15 RX ORDER — HYDROCODONE BITARTRATE AND ACETAMINOPHEN 7.5; 325 MG/1; MG/1
1 TABLET ORAL EVERY 8 HOURS PRN
Refills: 0 | COMMUNITY
Start: 2019-08-04

## 2019-08-15 RX ORDER — AMLODIPINE BESYLATE 5 MG/1
5 TABLET ORAL
Qty: 90 TAB | Refills: 3 | Status: SHIPPED | OUTPATIENT
Start: 2019-08-15 | End: 2020-08-12 | Stop reason: SDUPTHER

## 2019-08-15 RX ORDER — LISINOPRIL 20 MG/1
20 TABLET ORAL
Qty: 90 TAB | Refills: 3 | Status: SHIPPED | OUTPATIENT
Start: 2019-08-15 | End: 2020-08-12 | Stop reason: SDUPTHER

## 2019-08-15 ASSESSMENT — PATIENT HEALTH QUESTIONNAIRE - PHQ9
SUM OF ALL RESPONSES TO PHQ9 QUESTIONS 1 AND 2: 0
2. FEELING DOWN, DEPRESSED, IRRITABLE, OR HOPELESS: NOT AT ALL
9. THOUGHTS THAT YOU WOULD BE BETTER OFF DEAD, OR OF HURTING YOURSELF: NOT AT ALL
7. TROUBLE CONCENTRATING ON THINGS, SUCH AS READING THE NEWSPAPER OR WATCHING TELEVISION: NOT AT ALL
6. FEELING BAD ABOUT YOURSELF - OR THAT YOU ARE A FAILURE OR HAVE LET YOURSELF OR YOUR FAMILY DOWN: NOT AL ALL
5. POOR APPETITE OR OVEREATING: NOT AT ALL
1. LITTLE INTEREST OR PLEASURE IN DOING THINGS: NOT AT ALL
3. TROUBLE FALLING OR STAYING ASLEEP OR SLEEPING TOO MUCH: NOT AT ALL
8. MOVING OR SPEAKING SO SLOWLY THAT OTHER PEOPLE COULD HAVE NOTICED. OR THE OPPOSITE, BEING SO FIGETY OR RESTLESS THAT YOU HAVE BEEN MOVING AROUND A LOT MORE THAN USUAL: NOT AT ALL
4. FEELING TIRED OR HAVING LITTLE ENERGY: NOT AT ALL
SUM OF ALL RESPONSES TO PHQ QUESTIONS 1-9: 0

## 2019-08-15 NOTE — PROGRESS NOTES
Chief Complaint   Patient presents with   • Hypertension     FV       HISTORY OF PRESENT ILLNESS: Patient is a 59 y.o. female established patient who presents today to discuss the following issues:    Chronic back pain greater than 3 months duration  Followed by [facility].  Scheduled for injections.     Hypertension  Chronic in nature.  Stable on meds.  Due for labs and refills.      Breast cancer screening  Due for mammogram.    Need for vaccination  Due for Tdap.      Patient Active Problem List    Diagnosis Date Noted   • Breast cancer screening 08/15/2019   • Need for vaccination 08/15/2019   • Dysthymia 01/18/2018   • Nicotine vapor product user 03/28/2016   • Chronic back pain greater than 3 months duration 03/23/2015   • Hypokalemia 05/31/2013   • Hypertension 04/15/2013       Allergies:Patient has no known allergies.    Current Outpatient Medications   Medication Sig Dispense Refill   • HYDROcodone-acetaminophen (NORCO) 7.5-325 MG per tablet Take 1 Tab by mouth every 8 hours as needed.  0   • amLODIPine (NORVASC) 5 MG Tab Take 1 Tab by mouth every day. 90 Tab 3   • lisinopril (PRINIVIL) 20 MG Tab Take 1 Tab by mouth every day. 90 Tab 3   • Misc. Devices Misc Hot tub For alleviating pain and Arthritis discpmfprt 1 Each 0   • ibuprofen (MOTRIN) 400 MG Tab Take 400 mg by mouth every 6 hours as needed.     • acetaminophen (TYLENOL) 500 MG Tab Take 500-1,000 mg by mouth every 6 hours as needed.     • Multiple Vitamins-Minerals (ONE-A-DAY 50 PLUS PO) Take  by mouth every day.     • gabapentin (NEURONTIN) 300 MG Cap Take 1 Cap by mouth 3 times a day. as needed for nerve pain (Patient not taking: Reported on 8/15/2019) 90 Cap 2   • baclofen (LIORESAL) 10 MG Tab Take 1/2  to 1 tablet by mouth three times per day as needed for muscle spasm (Patient not taking: Reported on 8/15/2019) 90 Tab 2   • triamcinolone acetonide (KENALOG) 0.1 % Cream Apply 1 Application to affected area(s) 2 times a day. (Patient not taking:  Reported on 8/15/2019) 45 g 0     No current facility-administered medications for this visit.        Social History     Tobacco Use   • Smoking status: Former Smoker     Packs/day: 1.00     Years: 31.00     Pack years: 31.00     Types: Cigarettes     Last attempt to quit: 2015     Years since quittin.0   • Smokeless tobacco: Never Used   Substance Use Topics   • Alcohol use: Yes     Alcohol/week: 2.4 oz     Types: 4 Cans of beer per week   • Drug use: No       Family Status   Relation Name Status   • Mo     • Fa     • Sis  Alive   • Neg Hx  (Not Specified)     Family History   Problem Relation Age of Onset   • Hyperlipidemia Mother    • Heart Disease Mother    • Diabetes Mother    • Thyroid Mother    • Hyperlipidemia Father    • Cancer Father         leukemia due to chemical exposure   • Thyroid Sister    • Stroke Neg Hx        Review of Systems:   Constitutional: Negative for fever, chills, weight loss and malaise/fatigue.   HENT: Negative for ear pain, nosebleeds, congestion, sore throat and neck pain.    Eyes: Negative for blurred vision.   Respiratory: Negative for cough, sputum production, shortness of breath and wheezing.    Cardiovascular: Negative for chest pain, palpitations, orthopnea and leg swelling.   Gastrointestinal: Negative for heartburn, nausea, vomiting and abdominal pain.   Genitourinary: Negative for dysuria, urgency and frequency.   Musculoskeletal: Negative for myalgias, joint pain.  Positive for chronic low back pain.  Skin: Negative for rash and itching.   Neurological: Negative for dizziness, tingling, tremors, sensory change, focal weakness and headaches.   Endo/Heme/Allergies: Does not bruise/bleed easily.   Psychiatric/Behavioral: Negative for depression, suicidal ideas and memory loss.  The patient is not nervous/anxious and does not have insomnia.    All other systems reviewed and are negative except as in HPI.    Exam:  /62   Pulse 76   Temp 36.6 °C  "(97.9 °F)   Resp 16   Ht 1.676 m (5' 6\")   Wt 87.1 kg (192 lb)   SpO2 95%   General:  Well nourished, well developed female in NAD  Head: Grossly normal.  Neck: Supple without JVD or bruit. Thyroid is not enlarged.  Pulmonary: Clear to ausculation. Normal effort. No rales, ronchi, or wheezing.  Cardiovascular: Regular rate and rhythm without murmur.   Abdomen:  Soft, nontender, nondistended.  Extremities: No clubbing, cyanosis, or edema.  Skin: Intact with no obvious rashes or lesions.  Neuro: Grossly intact.  Psych: Alert and oriented x 3.  Mood and affect appropriate.    Medical decision-making and discussion: Jayashree is here today for follow-up.  Lab work and mammogram were ordered, her meds were refilled, and she was given a Tdap.  SHe will follow-up here as needed.    I have placed the below orders and discussed them with an approved delegating provider. The MA is performing the below orders under the direction of Dr. Thomas, who have provided verbal consent for supervision.            Assessment/Plan:  1. Breast cancer screening  MA-SCREENING MAMMO BILAT W/TOMOSYNTHESIS W/CAD   2. Essential hypertension  amLODIPine (NORVASC) 5 MG Tab    lisinopril (PRINIVIL) 20 MG Tab   3. Wellness examination  CBC WITH DIFFERENTIAL    Comp Metabolic Panel    Lipid Profile    TSH    VITAMIN D,25 HYDROXY   4. Screening for viral disease  HEP C VIRUS ANTIBODY   5. Need for vaccination  Tdap =>6yo IM   6. Chronic back pain greater than 3 months duration         Return if symptoms worsen or fail to improve.    Please note that this dictation was created using voice recognition software. I have made every reasonable attempt to correct obvious errors, but I expect that there are errors of grammar and possibly content that I did not discover before finalizing the note.  "

## 2019-09-03 ENCOUNTER — HOSPITAL ENCOUNTER (OUTPATIENT)
Dept: LAB | Facility: MEDICAL CENTER | Age: 59
End: 2019-09-03
Attending: NURSE PRACTITIONER
Payer: COMMERCIAL

## 2019-09-03 DIAGNOSIS — Z11.59 SCREENING FOR VIRAL DISEASE: ICD-10-CM

## 2019-09-03 DIAGNOSIS — Z00.00 WELLNESS EXAMINATION: ICD-10-CM

## 2019-09-03 LAB
25(OH)D3 SERPL-MCNC: 20 NG/ML (ref 30–100)
ALBUMIN SERPL BCP-MCNC: 4.2 G/DL (ref 3.2–4.9)
ALBUMIN/GLOB SERPL: 1.4 G/DL
ALP SERPL-CCNC: 55 U/L (ref 30–99)
ALT SERPL-CCNC: 25 U/L (ref 2–50)
ANION GAP SERPL CALC-SCNC: 9 MMOL/L (ref 0–11.9)
AST SERPL-CCNC: 23 U/L (ref 12–45)
BASOPHILS # BLD AUTO: 1.1 % (ref 0–1.8)
BASOPHILS # BLD: 0.06 K/UL (ref 0–0.12)
BILIRUB SERPL-MCNC: 0.4 MG/DL (ref 0.1–1.5)
BUN SERPL-MCNC: 24 MG/DL (ref 8–22)
CALCIUM SERPL-MCNC: 9.4 MG/DL (ref 8.5–10.5)
CHLORIDE SERPL-SCNC: 107 MMOL/L (ref 96–112)
CHOLEST SERPL-MCNC: 244 MG/DL (ref 100–199)
CO2 SERPL-SCNC: 23 MMOL/L (ref 20–33)
CREAT SERPL-MCNC: 0.92 MG/DL (ref 0.5–1.4)
EOSINOPHIL # BLD AUTO: 0.17 K/UL (ref 0–0.51)
EOSINOPHIL NFR BLD: 3.2 % (ref 0–6.9)
ERYTHROCYTE [DISTWIDTH] IN BLOOD BY AUTOMATED COUNT: 47 FL (ref 35.9–50)
FASTING STATUS PATIENT QL REPORTED: NORMAL
GLOBULIN SER CALC-MCNC: 2.9 G/DL (ref 1.9–3.5)
GLUCOSE SERPL-MCNC: 102 MG/DL (ref 65–99)
HCT VFR BLD AUTO: 44.1 % (ref 37–47)
HCV AB SER QL: NEGATIVE
HDLC SERPL-MCNC: 48 MG/DL
HGB BLD-MCNC: 14.4 G/DL (ref 12–16)
IMM GRANULOCYTES # BLD AUTO: 0.02 K/UL (ref 0–0.11)
IMM GRANULOCYTES NFR BLD AUTO: 0.4 % (ref 0–0.9)
LDLC SERPL CALC-MCNC: 137 MG/DL
LYMPHOCYTES # BLD AUTO: 1.2 K/UL (ref 1–4.8)
LYMPHOCYTES NFR BLD: 22.8 % (ref 22–41)
MCH RBC QN AUTO: 29.6 PG (ref 27–33)
MCHC RBC AUTO-ENTMCNC: 32.7 G/DL (ref 33.6–35)
MCV RBC AUTO: 90.6 FL (ref 81.4–97.8)
MONOCYTES # BLD AUTO: 0.43 K/UL (ref 0–0.85)
MONOCYTES NFR BLD AUTO: 8.2 % (ref 0–13.4)
NEUTROPHILS # BLD AUTO: 3.38 K/UL (ref 2–7.15)
NEUTROPHILS NFR BLD: 64.3 % (ref 44–72)
NRBC # BLD AUTO: 0 K/UL
NRBC BLD-RTO: 0 /100 WBC
PLATELET # BLD AUTO: 271 K/UL (ref 164–446)
PMV BLD AUTO: 11 FL (ref 9–12.9)
POTASSIUM SERPL-SCNC: 4.3 MMOL/L (ref 3.6–5.5)
PROT SERPL-MCNC: 7.1 G/DL (ref 6–8.2)
RBC # BLD AUTO: 4.87 M/UL (ref 4.2–5.4)
SODIUM SERPL-SCNC: 139 MMOL/L (ref 135–145)
TRIGL SERPL-MCNC: 295 MG/DL (ref 0–149)
TSH SERPL DL<=0.005 MIU/L-ACNC: 1.38 UIU/ML (ref 0.38–5.33)
WBC # BLD AUTO: 5.3 K/UL (ref 4.8–10.8)

## 2019-09-03 PROCEDURE — 85025 COMPLETE CBC W/AUTO DIFF WBC: CPT

## 2019-09-03 PROCEDURE — 86803 HEPATITIS C AB TEST: CPT

## 2019-09-03 PROCEDURE — 80053 COMPREHEN METABOLIC PANEL: CPT

## 2019-09-03 PROCEDURE — 82306 VITAMIN D 25 HYDROXY: CPT

## 2019-09-03 PROCEDURE — 84443 ASSAY THYROID STIM HORMONE: CPT

## 2019-09-03 PROCEDURE — 36415 COLL VENOUS BLD VENIPUNCTURE: CPT

## 2019-09-03 PROCEDURE — 80061 LIPID PANEL: CPT

## 2019-09-18 ENCOUNTER — OFFICE VISIT (OUTPATIENT)
Dept: MEDICAL GROUP | Facility: PHYSICIAN GROUP | Age: 59
End: 2019-09-18
Payer: COMMERCIAL

## 2019-09-18 VITALS
BODY MASS INDEX: 30.22 KG/M2 | SYSTOLIC BLOOD PRESSURE: 128 MMHG | RESPIRATION RATE: 16 BRPM | DIASTOLIC BLOOD PRESSURE: 80 MMHG | OXYGEN SATURATION: 97 % | WEIGHT: 188 LBS | HEART RATE: 74 BPM | HEIGHT: 66 IN | TEMPERATURE: 97.6 F

## 2019-09-18 DIAGNOSIS — E78.5 HYPERLIPIDEMIA, UNSPECIFIED HYPERLIPIDEMIA TYPE: ICD-10-CM

## 2019-09-18 PROCEDURE — 99212 OFFICE O/P EST SF 10 MIN: CPT | Performed by: NURSE PRACTITIONER

## 2019-09-19 PROBLEM — E78.5 HYPERLIPIDEMIA: Status: ACTIVE | Noted: 2019-09-19

## 2019-09-20 NOTE — ASSESSMENT & PLAN NOTE
Reviewed recent lab results.  Triglycerides have gone up but LDL has gone done.  Discussed diet and lifestyle changes.

## 2020-06-11 ENCOUNTER — TELEPHONE (OUTPATIENT)
Dept: HEALTH INFORMATION MANAGEMENT | Facility: OTHER | Age: 60
End: 2020-06-11

## 2020-06-11 NOTE — TELEPHONE ENCOUNTER
1. Caller Name: Jayashree Anthony               Call Back Number: [phone]  [facility] PCP or Specialty Provider: Yes Rajiv Mc        2.  In the last two weeks, has the patient had any new or worsening symptoms (not explained by alternative diagnosis)? No.    3.  Does patient have any comoribidities? None     4.  Has the patient traveled in the last 14 days OR had any known contact with someone who is suspected or confirmed to have COVID-19?  Yes, coworker Covid19 positive    5. POTENTIAL PUI (LOW): Advised to perform self care, monitor for worsening symptoms and to call back in 3 days if no improvement. Instructed to self isolate and contact [facility] at [phone]         Note routed to [facility] Provider: LINDA only.

## 2020-08-12 DIAGNOSIS — I10 ESSENTIAL HYPERTENSION: ICD-10-CM

## 2020-08-12 RX ORDER — AMLODIPINE BESYLATE 5 MG/1
5 TABLET ORAL
Qty: 90 TAB | Refills: 0 | Status: SHIPPED | OUTPATIENT
Start: 2020-08-12 | End: 2020-11-09

## 2020-08-12 RX ORDER — LISINOPRIL 20 MG/1
20 TABLET ORAL
Qty: 90 TAB | Refills: 0 | Status: SHIPPED | OUTPATIENT
Start: 2020-08-12 | End: 2020-11-09

## 2020-10-19 SDOH — ECONOMIC STABILITY: TRANSPORTATION INSECURITY
IN THE PAST 12 MONTHS, HAS THE LACK OF TRANSPORTATION KEPT YOU FROM MEDICAL APPOINTMENTS OR FROM GETTING MEDICATIONS?: NO

## 2020-10-19 SDOH — ECONOMIC STABILITY: HOUSING INSECURITY: IN THE LAST 12 MONTHS, HOW MANY PLACES HAVE YOU LIVED?: 1

## 2020-10-19 SDOH — HEALTH STABILITY: MENTAL HEALTH
STRESS IS WHEN SOMEONE FEELS TENSE, NERVOUS, ANXIOUS, OR CAN'T SLEEP AT NIGHT BECAUSE THEIR MIND IS TROUBLED. HOW STRESSED ARE YOU?: NOT AT ALL

## 2020-10-19 SDOH — ECONOMIC STABILITY: TRANSPORTATION INSECURITY
IN THE PAST 12 MONTHS, HAS LACK OF RELIABLE TRANSPORTATION KEPT YOU FROM MEDICAL APPOINTMENTS, MEETINGS, WORK OR FROM GETTING THINGS NEEDED FOR DAILY LIVING?: NO

## 2020-10-19 SDOH — ECONOMIC STABILITY: INCOME INSECURITY: IN THE LAST 12 MONTHS, WAS THERE A TIME WHEN YOU WERE NOT ABLE TO PAY THE MORTGAGE OR RENT ON TIME?: NO

## 2020-10-19 SDOH — HEALTH STABILITY: PHYSICAL HEALTH: ON AVERAGE, HOW MANY MINUTES DO YOU ENGAGE IN EXERCISE AT THIS LEVEL?: 20 MINUTES

## 2020-10-19 SDOH — ECONOMIC STABILITY: HOUSING INSECURITY
IN THE LAST 12 MONTHS, WAS THERE A TIME WHEN YOU DID NOT HAVE A STEADY PLACE TO SLEEP OR SLEPT IN A SHELTER (INCLUDING NOW)?: NO

## 2020-10-19 SDOH — HEALTH STABILITY: PHYSICAL HEALTH: ON AVERAGE, HOW MANY DAYS PER WEEK DO YOU ENGAGE IN MODERATE TO STRENUOUS EXERCISE (LIKE A BRISK WALK)?: 5 DAYS

## 2020-10-19 ASSESSMENT — SOCIAL DETERMINANTS OF HEALTH (SDOH)
HOW OFTEN DO YOU GET TOGETHER WITH FRIENDS OR RELATIVES?: ONCE A WEEK
IN A TYPICAL WEEK, HOW MANY TIMES DO YOU TALK ON THE PHONE WITH FAMILY, FRIENDS, OR NEIGHBORS?: MORE THAN THREE TIMES A WEEK
WITHIN THE PAST 12 MONTHS, THE FOOD YOU BOUGHT JUST DIDN'T LAST AND YOU DIDN'T HAVE MONEY TO GET MORE: NEVER TRUE
HOW OFTEN DO YOU ATTENT MEETINGS OF THE CLUB OR ORGANIZATION YOU BELONG TO?: DECLINE
HOW OFTEN DO YOU HAVE SIX OR MORE DRINKS ON ONE OCCASION: NEVER
HOW OFTEN DO YOU ATTEND CHURCH OR RELIGIOUS SERVICES?: DECLINE
HOW MANY DRINKS CONTAINING ALCOHOL DO YOU HAVE ON A TYPICAL DAY WHEN YOU ARE DRINKING: 3 OR 4
DO YOU BELONG TO ANY CLUBS OR ORGANIZATIONS SUCH AS CHURCH GROUPS UNIONS, FRATERNAL OR ATHLETIC GROUPS, OR SCHOOL GROUPS?: NO
HOW OFTEN DO YOU HAVE A DRINK CONTAINING ALCOHOL: 2-4 TIMES A MONTH
WITHIN THE PAST 12 MONTHS, YOU WORRIED THAT YOUR FOOD WOULD RUN OUT BEFORE YOU GOT THE MONEY TO BUY MORE: SOMETIMES TRUE
HOW HARD IS IT FOR YOU TO PAY FOR THE VERY BASICS LIKE FOOD, HOUSING, MEDICAL CARE, AND HEATING?: NOT VERY HARD

## 2020-10-20 ENCOUNTER — OFFICE VISIT (OUTPATIENT)
Dept: MEDICAL GROUP | Facility: PHYSICIAN GROUP | Age: 60
End: 2020-10-20
Payer: COMMERCIAL

## 2020-10-20 VITALS
OXYGEN SATURATION: 99 % | HEIGHT: 66 IN | SYSTOLIC BLOOD PRESSURE: 110 MMHG | DIASTOLIC BLOOD PRESSURE: 74 MMHG | TEMPERATURE: 97.4 F | BODY MASS INDEX: 26.84 KG/M2 | RESPIRATION RATE: 14 BRPM | HEART RATE: 66 BPM | WEIGHT: 167 LBS

## 2020-10-20 DIAGNOSIS — M54.9 CHRONIC BACK PAIN GREATER THAN 3 MONTHS DURATION: ICD-10-CM

## 2020-10-20 DIAGNOSIS — Z12.31 ENCOUNTER FOR SCREENING MAMMOGRAM FOR BREAST CANCER: ICD-10-CM

## 2020-10-20 DIAGNOSIS — E55.9 VITAMIN D DEFICIENCY: ICD-10-CM

## 2020-10-20 DIAGNOSIS — R73.03 PREDIABETES: ICD-10-CM

## 2020-10-20 DIAGNOSIS — G89.29 CHRONIC BACK PAIN GREATER THAN 3 MONTHS DURATION: ICD-10-CM

## 2020-10-20 DIAGNOSIS — Z01.419 WELL WOMAN EXAM: ICD-10-CM

## 2020-10-20 DIAGNOSIS — I10 ESSENTIAL HYPERTENSION: Chronic | ICD-10-CM

## 2020-10-20 DIAGNOSIS — E78.5 HYPERLIPIDEMIA, UNSPECIFIED HYPERLIPIDEMIA TYPE: Chronic | ICD-10-CM

## 2020-10-20 DIAGNOSIS — D17.21 LIPOMA OF RIGHT UPPER EXTREMITY: ICD-10-CM

## 2020-10-20 PROBLEM — D17.9 LIPOMA: Status: ACTIVE | Noted: 2020-10-20

## 2020-10-20 PROCEDURE — 99203 OFFICE O/P NEW LOW 30 MIN: CPT | Performed by: INTERNAL MEDICINE

## 2020-10-20 ASSESSMENT — FIBROSIS 4 INDEX: FIB4 SCORE: 1.02

## 2020-10-20 NOTE — ASSESSMENT & PLAN NOTE
This is a chronic condition for the patient currently taking amlodipine and lisinopril.  Blood pressure has been stable today the blood pressure 110/74.  No side effect reported.

## 2020-10-20 NOTE — ASSESSMENT & PLAN NOTE
Chronic condition for patient currently taking vitamin D supplementation.  Patient is due for blood test.

## 2020-10-20 NOTE — PROGRESS NOTES
CC: Meet new provider  Patient complaining of a lump on her right arm for 7 years      HPI: This is a 60 y.o. pt.  Pt's medical history is notable for:     Hypertension  This is a chronic condition for the patient currently taking amlodipine and lisinopril.  Blood pressure has been stable today the blood pressure 110/74.  No side effect reported.    Hyperlipidemia  Chronic condition noted with previous lab test.  Patient is due for follow-up blood tests    Prediabetes  Chronic condition for the patient currently on diet therapy patient is due for lab test.    Vitamin D deficiency  Chronic condition for patient currently taking vitamin D supplementation.  Patient is due for blood test.      Lump, right arm  Patient noted lump on her right arm for 7 years.  She denies any change in size.  No pain or discomfort noted.    REVIEW OF SYSTEMS:     Constitutional:  no fever / chills   Neurologic: no headaches, no numbness/tingling  Eyes: no changes in vision  ENT: no sore throat, no hearing loss  CV:  no chest pain, no palpitations  Pulmonary: no SOB, no cough    GI: no nausea / vomiting, no diarrhea, no constipation  :  no dysuria, no hematuria         Allergies: Patient has no known allergies.    Current Outpatient Medications Ordered in Epic   Medication Sig Dispense Refill   • lisinopril (PRINIVIL) 20 MG Tab Take 1 Tab by mouth every day. 90 Tab 0   • amLODIPine (NORVASC) 5 MG Tab Take 1 Tab by mouth every day. 90 Tab 0   • Cholecalciferol (VITAMIN D3 PO) Take  by mouth.     • HYDROcodone-acetaminophen (NORCO) 7.5-325 MG per tablet Take 1 Tab by mouth every 8 hours as needed.  0   • Misc. Devices Misc Hot tub For alleviating pain and Arthritis discpmfprt 1 Each 0   • ibuprofen (MOTRIN) 400 MG Tab Take 400 mg by mouth every 6 hours as needed.     • acetaminophen (TYLENOL) 500 MG Tab Take 500-1,000 mg by mouth every 6 hours as needed.     • Multiple Vitamins-Minerals (ONE-A-DAY 50 PLUS PO) Take  by mouth every day.        No current Epic-ordered facility-administered medications on file.        Past Medical History:   Diagnosis Date   • Active smoker 4/15/2013   • Arthritis    • Chronic back pain greater than 3 months duration 3/23/2015   • Hypertension    • Hypertension 4/15/2013   • Scoliosis    • Varicose vein         Past Surgical History:   Procedure Laterality Date   • CERVICAL FUSION POSTERIOR  2011    Performed by ROBERT SIGALA at SURGERY [facility] ORS   • CERVICAL LAMINECTOMY POSTERIOR  2011    Performed by ROBERT SIGALA at SURGERY [facility] ORS   • OTHER ORTHOPEDIC SURGERY  1968    right  arm orif        Family History   Problem Relation Age of Onset   • Hyperlipidemia Mother    • Heart Disease Mother    • Diabetes Mother    • Thyroid Mother    • Hyperlipidemia Father    • Cancer Father         leukemia due to chemical exposure   • Thyroid Sister    • Stroke Neg Hx         Social History     Tobacco Use   Smoking Status Former Smoker   • Packs/day: 1.00   • Years: 31.   • Pack years: 31.00   • Types: Cigarettes   • Quit date: 2015   • Years since quittin.2   Smokeless Tobacco Never Used          Social History     Substance and Sexual Activity   Alcohol Use Yes   • Alcohol/week: 2.4 oz   • Types: 4 Cans of beer per week   • Frequency: 2-4 times a month   • Drinks per session: 3 or 4   • Binge frequency: Never        ---------------------------------------------------------------------     PHYSICAL EXAM:   Vitals:    10/20/20 0818   BP: 110/74   Pulse: 66   Resp: 14   Temp: 36.3 °C (97.4 °F)   SpO2: 99%      Body mass index is 26.95 kg/m².        Constitutional: no acute distress  Neck: supple, no JVD  CV: heart RRR  Resp: normal effort, no wheezing or rales.  GI: abdomen soft, no obvious mass, no tenderness  Neuro: CN 2-12 grossly intact  Right arm there is a palpable mass approximately 4 x 4.5 cm in size and is soft  nontender        ---------------------------------------------------------------------     ASSESSMENT and PLAN:  1. Well woman exam    - REFERRAL TO GYNECOLOGY    2. Encounter for screening mammogram for breast cancer    - MA-SCREENING MAMMO BILAT W/TOMOSYNTHESIS W/CAD; Future    3. Prediabetes  Chronic condition.  Let the patient regarding low sweet and low-carb diet.  Lab tests ordered for follow-up.  - HEMOGLOBIN A1C; Future  - Basic Metabolic Panel; Future    4. Vitamin D deficiency  Lab tests ordered.  - VITAMIN D,25 HYDROXY; Future    5. Essential hypertension  Chronic stable condition.  Continue with amlodipine and lisinopril.  - CBC WITH DIFFERENTIAL; Future  - TSH; Future  - MICROALBUMIN CREAT RATIO URINE; Future    6. Hyperlipidemia, unspecified hyperlipidemia type  Advised the patient to repeat lipid panel.  Recommend low-fat low-cholesterol diet.  - ALANINE AMINO-TRANS; Future  - Lipid Profile; Future    7. L lump right arm.  Examination is suggestive of lipoma.  Offered to refer the patient to general surgery for further evaluation.  The patient declined.            Return in about 6 months (around 4/20/2021).       PATIENT EDUCATION:  -If any problems should arise, patient was advised to contact our office or go to ER to be evaluated.  -Advised pt to follow a healthy diet and regular aerobic exercise regimen. Advised pt to avoid alcohol and tobacco use.    Please note that this dictation was created using voice recognition software. I have made every reasonable attempt to correct obvious errors, but it is possible there are errors of grammar and possibly content that I did not discover before finalizing the note.

## 2020-11-05 DIAGNOSIS — I10 ESSENTIAL HYPERTENSION: ICD-10-CM

## 2020-11-06 ENCOUNTER — HOSPITAL ENCOUNTER (OUTPATIENT)
Dept: LAB | Facility: MEDICAL CENTER | Age: 60
End: 2020-11-06
Attending: INTERNAL MEDICINE
Payer: COMMERCIAL

## 2020-11-06 DIAGNOSIS — I10 ESSENTIAL HYPERTENSION: Chronic | ICD-10-CM

## 2020-11-06 DIAGNOSIS — R73.03 PREDIABETES: ICD-10-CM

## 2020-11-06 DIAGNOSIS — E78.5 HYPERLIPIDEMIA, UNSPECIFIED HYPERLIPIDEMIA TYPE: Chronic | ICD-10-CM

## 2020-11-06 DIAGNOSIS — E55.9 VITAMIN D DEFICIENCY: ICD-10-CM

## 2020-11-06 LAB
25(OH)D3 SERPL-MCNC: 50 NG/ML (ref 30–100)
ALT SERPL-CCNC: 20 U/L (ref 2–50)
ANION GAP SERPL CALC-SCNC: 9 MMOL/L (ref 7–16)
BASOPHILS # BLD AUTO: 1 % (ref 0–1.8)
BASOPHILS # BLD: 0.05 K/UL (ref 0–0.12)
BUN SERPL-MCNC: 23 MG/DL (ref 8–22)
CALCIUM SERPL-MCNC: 10.2 MG/DL (ref 8.5–10.5)
CHLORIDE SERPL-SCNC: 101 MMOL/L (ref 96–112)
CHOLEST SERPL-MCNC: 246 MG/DL (ref 100–199)
CO2 SERPL-SCNC: 26 MMOL/L (ref 20–33)
CREAT SERPL-MCNC: 0.78 MG/DL (ref 0.5–1.4)
CREAT UR-MCNC: 79.02 MG/DL
EOSINOPHIL # BLD AUTO: 0.13 K/UL (ref 0–0.51)
EOSINOPHIL NFR BLD: 2.6 % (ref 0–6.9)
ERYTHROCYTE [DISTWIDTH] IN BLOOD BY AUTOMATED COUNT: 49.6 FL (ref 35.9–50)
EST. AVERAGE GLUCOSE BLD GHB EST-MCNC: 108 MG/DL
FASTING STATUS PATIENT QL REPORTED: NORMAL
GLUCOSE SERPL-MCNC: 85 MG/DL (ref 65–99)
HBA1C MFR BLD: 5.4 % (ref 0–5.6)
HCT VFR BLD AUTO: 42.9 % (ref 37–47)
HDLC SERPL-MCNC: 51 MG/DL
HGB BLD-MCNC: 14 G/DL (ref 12–16)
IMM GRANULOCYTES # BLD AUTO: 0.03 K/UL (ref 0–0.11)
IMM GRANULOCYTES NFR BLD AUTO: 0.6 % (ref 0–0.9)
LDLC SERPL CALC-MCNC: 171 MG/DL
LYMPHOCYTES # BLD AUTO: 1.43 K/UL (ref 1–4.8)
LYMPHOCYTES NFR BLD: 28.7 % (ref 22–41)
MCH RBC QN AUTO: 29.9 PG (ref 27–33)
MCHC RBC AUTO-ENTMCNC: 32.6 G/DL (ref 33.6–35)
MCV RBC AUTO: 91.7 FL (ref 81.4–97.8)
MICROALBUMIN UR-MCNC: <1.2 MG/DL
MICROALBUMIN/CREAT UR: NORMAL MG/G (ref 0–30)
MONOCYTES # BLD AUTO: 0.49 K/UL (ref 0–0.85)
MONOCYTES NFR BLD AUTO: 9.8 % (ref 0–13.4)
NEUTROPHILS # BLD AUTO: 2.85 K/UL (ref 2–7.15)
NEUTROPHILS NFR BLD: 57.3 % (ref 44–72)
NRBC # BLD AUTO: 0 K/UL
NRBC BLD-RTO: 0 /100 WBC
PLATELET # BLD AUTO: 282 K/UL (ref 164–446)
PMV BLD AUTO: 11.3 FL (ref 9–12.9)
POTASSIUM SERPL-SCNC: 4.6 MMOL/L (ref 3.6–5.5)
RBC # BLD AUTO: 4.68 M/UL (ref 4.2–5.4)
SODIUM SERPL-SCNC: 136 MMOL/L (ref 135–145)
TRIGL SERPL-MCNC: 121 MG/DL (ref 0–149)
TSH SERPL DL<=0.005 MIU/L-ACNC: 2.29 UIU/ML (ref 0.38–5.33)
WBC # BLD AUTO: 5 K/UL (ref 4.8–10.8)

## 2020-11-06 PROCEDURE — 82043 UR ALBUMIN QUANTITATIVE: CPT

## 2020-11-06 PROCEDURE — 82570 ASSAY OF URINE CREATININE: CPT

## 2020-11-06 PROCEDURE — 83036 HEMOGLOBIN GLYCOSYLATED A1C: CPT

## 2020-11-06 PROCEDURE — 36415 COLL VENOUS BLD VENIPUNCTURE: CPT

## 2020-11-06 PROCEDURE — 84443 ASSAY THYROID STIM HORMONE: CPT

## 2020-11-06 PROCEDURE — 80061 LIPID PANEL: CPT

## 2020-11-06 PROCEDURE — 85025 COMPLETE CBC W/AUTO DIFF WBC: CPT

## 2020-11-06 PROCEDURE — 84460 ALANINE AMINO (ALT) (SGPT): CPT

## 2020-11-06 PROCEDURE — 82306 VITAMIN D 25 HYDROXY: CPT

## 2020-11-06 PROCEDURE — 80048 BASIC METABOLIC PNL TOTAL CA: CPT

## 2020-11-08 RX ORDER — ATORVASTATIN CALCIUM 20 MG/1
20 TABLET, FILM COATED ORAL DAILY
Qty: 30 TAB | Refills: 6 | Status: SHIPPED | OUTPATIENT
Start: 2020-11-08 | End: 2021-05-26

## 2020-11-09 ENCOUNTER — TELEPHONE (OUTPATIENT)
Dept: MEDICAL GROUP | Facility: PHYSICIAN GROUP | Age: 60
End: 2020-11-09

## 2020-11-09 RX ORDER — LISINOPRIL 20 MG/1
TABLET ORAL
Qty: 90 TAB | Refills: 1 | Status: SHIPPED | OUTPATIENT
Start: 2020-11-09 | End: 2021-04-13

## 2020-11-09 RX ORDER — AMLODIPINE BESYLATE 5 MG/1
TABLET ORAL
Qty: 90 TAB | Refills: 1 | Status: SHIPPED | OUTPATIENT
Start: 2020-11-09 | End: 2021-03-17

## 2020-11-09 NOTE — TELEPHONE ENCOUNTER
Phone Number Called: [phone]    Call outcome: I spoke with the pt concerning her recent lab results and the message from Dr Mike.  Pt is aware that Rx was sent to her pharmacy with the Instructions.  Pt is aware that she needs to repeat the labs and follow up with PCP in 4 months.   Juli Mesa CMA

## 2020-11-09 NOTE — TELEPHONE ENCOUNTER
----- Message from Mauro Mike M.D. sent at 11/8/2020  8:07 AM PST -----    Please call patient :  Cholesterol :       246 high               [Desirable range = below 200]  pls begin taking atorvastatin 20mg qhs. rx sent to pharm.  Please start/continue with low fat low cholesterol diet, continue to exercise regularly and maintain an ideal weight.  Pls f.u w pcp approx 4mo to repeat cholesterol test      Liver enzyme,  chemistry panel,  kidney function test, vitamin d level, urine microalbumin,  thyroid test,  anemia test:  are within acceptable levels.

## 2020-11-09 NOTE — TELEPHONE ENCOUNTER
Refill X 6 months, sent to pharmacy.Pt. Seen in the last 6 months per protocol.   Lab Results   Component Value Date/Time    SODIUM 136 11/06/2020 06:09 AM    POTASSIUM 4.6 11/06/2020 06:09 AM    CHLORIDE 101 11/06/2020 06:09 AM    CO2 26 11/06/2020 06:09 AM    GLUCOSE 85 11/06/2020 06:09 AM    BUN 23 (H) 11/06/2020 06:09 AM    CREATININE 0.78 11/06/2020 06:09 AM

## 2020-11-09 NOTE — TELEPHONE ENCOUNTER
Last seen by PCP 10/20/2020.   Last Blood Pressure reading was 110/74 on 10/20/2020    Lab Results   Component Value Date/Time    SODIUM 136 11/06/2020 06:09 AM    POTASSIUM 4.6 11/06/2020 06:09 AM    CHLORIDE 101 11/06/2020 06:09 AM    CO2 26 11/06/2020 06:09 AM    GLUCOSE 85 11/06/2020 06:09 AM    BUN 23 (H) 11/06/2020 06:09 AM    CREATININE 0.78 11/06/2020 06:09 AM       Will send 6 month(s) to the pharmacy.

## 2021-01-25 ENCOUNTER — PATIENT MESSAGE (OUTPATIENT)
Dept: MEDICAL GROUP | Facility: PHYSICIAN GROUP | Age: 61
End: 2021-01-25

## 2021-01-25 NOTE — TELEPHONE ENCOUNTER
From: Jayashree Anthony  To: Terrie Avilez Med Ass't  Sent: 1/25/2021 7:48 AM PST  Subject: Non-Urgent Medical Question    Where would I go for that. A lot of snow out there      ----- Message -----   From:Terrie Avilez Med Ass't   Sent:1/25/2021 7:24 AM PST   To:Jayashree Anthony   Subject:RE: Non-Urgent Medical Question    Good Morning Jyaashree,    Sorry to hear that you are not feeling well. Would you like us to order you a Covid-19 screening? Please advise.    Thanks,  Terrie FRANK      ----- Message -----   From:Jayashree Anthony   Sent:1/25/2021 7:12 AM PST   To:Mauro Mike M.D.   Subject:Non-Urgent Medical Question    I have had flu like symptoms since 1/20/2021. My symptoms are headache, low fever, body aches and pain, fatigue. I have not got tested yet, not sure what I need to do. I did get better a little Saturday and part of Sunday. Then Sunday afternoon evening it all came back. Not sure what I should do, or is there a doctors excuse I could get messaged to me? I am not feeling well again today. Not sure how long this lasts.   Thanks so much  Jayashree Anthony

## 2021-01-25 NOTE — TELEPHONE ENCOUNTER
From: Jayashree Anthony  To: Mauro Mike M.D.  Sent: 1/25/2021 7:12 AM PST  Subject: Non-Urgent Medical Question    I have had flu like symptoms since 1/20/2021. My symptoms are headache, low fever, body aches and pain, fatigue. I have not got tested yet, not sure what I need to do. I did get better a little Saturday and part of Sunday. Then Sunday afternoon evening it all came back. Not sure what I should do, or is there a doctors excuse I could get messaged to me? I am not feeling well again today. Not sure how long this lasts.   Thanks so much  Jayashree Anthony

## 2021-01-25 NOTE — TELEPHONE ENCOUNTER
From: Jayashree Anthony  To: Derrick Ventura Ass't  Sent: 1/25/2021 10:23 AM PST  Subject: Non-Urgent Medical Question    I have an appointment scheduled for a rapid test with [facility] coming to the house 6pm tomorrow. Should I send a copy of results to you?      ----- Message -----   From:Derrick Ventura Ass't   Sent:1/25/2021 9:37 AM PST   To:Jayashree Anthony   Subject:RE: Non-Urgent Medical Question    You can be tested at any  as long as you are symptomatic. It still takes a few days but it would take a few days also to have an order to go and be tested at our locations: [facility], [facility], [facility], [facility], [facility] and [facility] (7am -12pm).      ----- Message -----   From:Jayashree Anthony   Sent:1/25/2021 9:10 AM PST   To:Derrick Ventura Ass'danielle   Subject:Non-Urgent Medical Question    Can I get tested at the [place] location? I'm [place] mile away from there      ----- Message -----   From:Derrick Ventura Ass't   Sent:1/25/2021 8:53 AM PST   To:Jayashree Anthony   Subject:RE: Non-Urgent Medical Question    The easiest way would be to go to  or we can print out an order and you can go to one of our 6 locations to be tested. UC might be easier for you due to all the snow.    Let me know what you would like to do :)    ThanksTerrie MA      ----- Message -----   From:Jayashree Anthony   Sent:1/25/2021 7:48 AM PST   To:Derrick Ventura'danielle   Subject:Non-Urgent Medical Question    Where would I go for that. A lot of snow out there      ----- Message -----   From:Derrick Ventura Ass't   Sent:1/25/2021 7:24 AM PST   To:Jayashree Anthony   Subject:RE: Non-Urgent Medical Question    Good Morning Jayashree,    Sorry to hear that you are not feeling well. Would you like us to order you a Covid-19 screening? Please advise.    Thanks,  Terrie FRANK      ----- Message -----   From:Jayashree Anthony   Sent:1/25/2021 7:12 AM PST   To:Mauro Mike M.D.   Subject:Non-Urgent  Medical Question    I have had flu like symptoms since 1/20/2021. My symptoms are headache, low fever, body aches and pain, fatigue. I have not got tested yet, not sure what I need to do. I did get better a little Saturday and part of Sunday. Then Sunday afternoon evening it all came back. Not sure what I should do, or is there a doctors excuse I could get messaged to me? I am not feeling well again today. Not sure how long this lasts.   Thanks so much  Jayashree Anthony

## 2021-01-25 NOTE — TELEPHONE ENCOUNTER
From: Jayashree Anthony  To: Derrick Ventura Ass't  Sent: 1/25/2021 9:10 AM PST  Subject: Non-Urgent Medical Question    Can I get tested at the [place] location? I'm a mile away from there      ----- Message -----   From:Derrick Ventura Ass't   Sent:1/25/2021 8:53 AM PST   To:Jayashree Anthony   Subject:RE: Non-Urgent Medical Question    The easiest way would be to go to  or we can print out an order and you can go to one of our 6 locations to be tested. UC might be easier for you due to all the snow.    Let me know what you would like to do :)    Terrie Munoz MA      ----- Message -----   From:Jayashree Anthony   Sent:1/25/2021 7:48 AM PST   To:Derrick Ventura Ass't   Subject:Non-Urgent Medical Question    Where would I go for that. A lot of snow out there      ----- Message -----   From:Derrick Ventura Ass't   Sent:1/25/2021 7:24 AM PST   To:Jayashree Anthony   Subject:RE: Non-Urgent Medical Question    Good Morning Jayashree,    Sorry to hear that you are not feeling well. Would you like us to order you a Covid-19 screening? Please advise.    Terrie Munoz MA      ----- Message -----   From:Jayashree Anthony   Sent:1/25/2021 7:12 AM PST   To:Mauro Mike M.D.   Subject:Non-Urgent Medical Question    I have had flu like symptoms since 1/20/2021. My symptoms are headache, low fever, body aches and pain, fatigue. I have not got tested yet, not sure what I need to do. I did get better a little Saturday and part of Sunday. Then Sunday afternoon evening it all came back. Not sure what I should do, or is there a doctors excuse I could get messaged to me? I am not feeling well again today. Not sure how long this lasts.   Thanks so much  Jayashree Anthony

## 2021-01-27 ENCOUNTER — PATIENT MESSAGE (OUTPATIENT)
Dept: MEDICAL GROUP | Facility: PHYSICIAN GROUP | Age: 61
End: 2021-01-27

## 2021-01-27 NOTE — TELEPHONE ENCOUNTER
From: Jayashree Anthony  To: Derrick Ventura Ass't  Sent: 1/27/2021 7:32 AM PST  Subject: Non-Urgent Medical Question    Hi Terrie,  I did have the [facility] come to the house to do a covid test. I wasn't aware they only have rapid testing. He had advised me this test may not work because of being to late in the illness. The doctor was pretty sure it was covid. And I should go through drive thru testing or what ever for the better test. I did almost pass out yesterday morning, but in the long run it was a better day, but no appetite. My blood pressure last night was 92 over 54. Little low. I took it myself this morning and it was 119 over81. So better. So this is what I have so far. I do have a doctors slip to cover the 10 day isolation.      ----- Message -----   From:Derrick Ventura Ass't   Sent:1/25/2021 10:46 AM PST    To:Jayashree Anthony   Subject:RE: Non-Urgent Medical Question    Yes please! Thank You!      ----- Message -----   From:Jayashree Anthony   Sent:1/25/2021 10:23 AM PST   To:Derrick Ventura'danielle   Subject:Non-Urgent Medical Question    I have an appointment scheduled for a rapid test with [facility] coming to the house 6pm tomorrow. Should I send a copy of results to you?      ----- Message -----   From:Derrick Ventura'danielle   Sent:1/25/2021 9:37 AM PST   To:Jayashree Anthony   Subject:RE: Non-Urgent Medical Question    You can be tested at any UC as long as you are symptomatic. It still takes a few days but it would take a few days also to have an order to go and be tested at our locations: [facility], [facility], [address], [facility], [place], [facility] and [facility] Thru (7am -12pm).      ----- Message -----   From:Jayashree Anthony   Sent:1/25/2021 9:10 AM PST   To:Derrick Ventura Ass't   Subject:Non-Urgent Medical Question    Can I get tested at the [place] location? I'm a mile away from there      ----- Message -----   From:Derrick Ventura Ass't   Sent:1/25/2021 8:53  AM PST   To:Jayashree Anthony   Subject:RE: Non-Urgent Medical Question    The easiest way would be to go to UC or we can print out an order and you can go to one of our 6 locations to be tested. UC might be easier for you due to all the snow.    Let me know what you would like to do :)    Terrie Munoz MA      ----- Message -----   From:Jayashree Anthony   Sent:1/25/2021 7:48 AM PST   To:Derrick Ventura Ass't   Subject:Non-Urgent Medical Question    Where would I go for that. A lot of snow out there      ----- Message -----   From:Terrie Avilez Med Ass't   Sent:1/25/2021 7:24 AM PST   To:Jayashree Anthony   Subject:RE: Non-Urgent Medical Question    Good Morning Jayashree,    Sorry to hear that you are not feeling well. Would you like us to order you a Covid-19 screening? Please advise.    Terrie Munoz MA      ----- Message -----   From:Jayashree Anthony   Sent:1/25/2021 7:12 AM PST   To:Mauro Mike M.D.   Subject:Non-Urgent Medical Question    I have had flu like symptoms since 1/20/2021. My symptoms are headache, low fever, body aches and pain, fatigue. I have not got tested yet, not sure what I need to do. I did get better a little Saturday and part of Sunday. Then Sunday afternoon evening it all came back. Not sure what I should do, or is there a doctors excuse I could get messaged to me? I am not feeling well again today. Not sure how long this lasts.   Thanks so much  Jayashree Anthony

## 2021-01-27 NOTE — TELEPHONE ENCOUNTER
From: Jayashree Anthony  To: Derrick Ventura Ass't  Sent: 1/27/2021 8:02 AM PST  Subject: Non-Urgent Medical Question    It was negative, but the doctor advised a very small window to test with rapid tests. I think if things change and don't keep improving I will look into that. Yesterday I actually did improve and slept really well.      ----- Message -----   From:Derrick Ventura Ass't   Sent:1/27/2021 7:40 AM PST   To:Jayashree Anthony   Subject:RE: Non-Urgent Medical Question    Rickey Blanc,    Did the rapid test come out negative? Have you thought about going to the  for all of your symptoms? Please advise.      ----- Message -----   From:Jayashree Anthony   Sent:1/27/2021 7:32 AM PST   To:Derrick Ventura Ass't   Subject:Non-Urgent Medical Question    Hi Terrie,  I did have the [facility] come to the house to do a covid test. I wasn't aware they only have rapid testing. He had advised me this test may not work because of being to late in the illness. The doctor was pretty sure it was covid. And I should go through drive thru testing or what ever for the better test. I did almost pass out yesterday morning, but in the long run it was a better day, but no appetite. My blood pressure last night was 92 over 54. Little low. I took it myself this morning and it was 119 over81. So better. So this is what I have so far. I do have a doctors slip to cover the 10 day isolation.      ----- Message -----   From:Derrick Ventura Ass't   Sent:1/25/2021 10:46 AM PST   To:Jayashree Anthony   Subject:RE: Non-Urgent Medical Question    Yes please! Thank You!      ----- Message -----   From:Jayashree Anthony   Sent:1/25/2021 10:23 AM PST   To:Derrick Ventura Ass'danielle   Subject:Non-Urgent Medical Question    I have an appointment scheduled for a rapid test with [facility] coming to the house 6pm tomorrow. Should I send a copy of results to you?      ----- Message -----   From:Terrie Avilez, Med Ass't   Sent:1/25/2021 9:37 AM  PST   To:Jayashree Anthony   Subject:RE: Non-Urgent Medical Question    You can be tested at any  as long as you are symptomatic. It still takes a few days but it would take a few days also to have an order to go and be tested at our locations: [facility], [place], [address], [facility] [facility] and [facility] (7am -12pm).      ----- Message -----   From:Jayashree Anthony   Sent:1/25/2021 9:10 AM PST   To:Derrick Ventura Ass't   Subject:Non-Urgent Medical Question    Can I get tested at the [place] location? I'm [place] mile away from there      ----- Message -----   From:Derrick Ventura Ass't   Sent:1/25/2021 8:53 AM PST   To:Jayashree Anthony   Subject:RE: Non-Urgent Medical Question    The easiest way would be to go to UC or we can print out an order and you can go to one of our 6 locations to be tested. UC might be easier for you due to all the snow.    Let me know what you would like to do :)    ThanksTerrie MA      ----- Message -----   From:Jayashree Anthony   Sent:1/25/2021 7:48 AM PST   To:Derrick Ventura Ass't   Subject:Non-Urgent Medical Question    Where would I go for that. A lot of snow out there      ----- Message -----   From:Derrick Ventura Ass't   Sent:1/25/2021 7:24 AM PST   To:Jayashree Anthony   Subject:RE: Non-Urgent Medical Question    Good Morning Jayashree,    Sorry to hear that you are not feeling well. Would you like us to order you a Covid-19 screening? Please advise.    Terrie Munoz MA      ----- Message -----   From:Jayashree Anthony   Sent:1/25/2021 7:12 AM PST   To:Mauro Mike M.D.   Subject:Non-Urgent Medical Question    I have had flu like symptoms since 1/20/2021. My symptoms are headache, low fever, body aches and pain, fatigue. I have not got tested yet, not sure what I need to do. I did get better a little Saturday and part of Sunday. Then Sunday afternoon evening it all came back. Not sure what I should do, or is there a doctors excuse I  could get messaged to me? I am not feeling well again today. Not sure how long this lasts.   Thanks so much  Jayashree Anthony

## 2021-01-28 ENCOUNTER — PATIENT MESSAGE (OUTPATIENT)
Dept: MEDICAL GROUP | Facility: PHYSICIAN GROUP | Age: 61
End: 2021-01-28

## 2021-02-11 ENCOUNTER — HOSPITAL ENCOUNTER (OUTPATIENT)
Dept: RADIOLOGY | Facility: MEDICAL CENTER | Age: 61
End: 2021-02-11
Attending: INTERNAL MEDICINE
Payer: COMMERCIAL

## 2021-02-11 DIAGNOSIS — Z12.31 ENCOUNTER FOR SCREENING MAMMOGRAM FOR BREAST CANCER: ICD-10-CM

## 2021-02-11 PROCEDURE — 77063 BREAST TOMOSYNTHESIS BI: CPT

## 2021-03-15 DIAGNOSIS — Z23 NEED FOR VACCINATION: ICD-10-CM

## 2021-03-17 ENCOUNTER — OFFICE VISIT (OUTPATIENT)
Dept: MEDICAL GROUP | Facility: PHYSICIAN GROUP | Age: 61
End: 2021-03-17
Payer: COMMERCIAL

## 2021-03-17 VITALS
RESPIRATION RATE: 16 BRPM | WEIGHT: 166 LBS | SYSTOLIC BLOOD PRESSURE: 130 MMHG | HEIGHT: 65 IN | BODY MASS INDEX: 27.66 KG/M2 | TEMPERATURE: 97.2 F | HEART RATE: 88 BPM | DIASTOLIC BLOOD PRESSURE: 80 MMHG | OXYGEN SATURATION: 97 %

## 2021-03-17 DIAGNOSIS — I10 ESSENTIAL HYPERTENSION: Chronic | ICD-10-CM

## 2021-03-17 DIAGNOSIS — Z12.4 ENCOUNTER FOR PAPANICOLAOU SMEAR OF CERVIX: ICD-10-CM

## 2021-03-17 DIAGNOSIS — Z23 NEED FOR VACCINATION: ICD-10-CM

## 2021-03-17 DIAGNOSIS — Z79.899 MEDICATION MANAGEMENT: ICD-10-CM

## 2021-03-17 DIAGNOSIS — E78.00 PURE HYPERCHOLESTEROLEMIA: Chronic | ICD-10-CM

## 2021-03-17 DIAGNOSIS — R73.02 IMPAIRED GLUCOSE TOLERANCE: ICD-10-CM

## 2021-03-17 DIAGNOSIS — M54.9 CHRONIC BACK PAIN GREATER THAN 3 MONTHS DURATION: ICD-10-CM

## 2021-03-17 DIAGNOSIS — Z12.11 SCREEN FOR COLON CANCER: ICD-10-CM

## 2021-03-17 DIAGNOSIS — H40.89 OTHER GLAUCOMA OF BOTH EYES: ICD-10-CM

## 2021-03-17 DIAGNOSIS — G89.29 CHRONIC BACK PAIN GREATER THAN 3 MONTHS DURATION: ICD-10-CM

## 2021-03-17 PROBLEM — Z00.00 HEALTHCARE MAINTENANCE: Status: ACTIVE | Noted: 2021-03-17

## 2021-03-17 PROCEDURE — 90471 IMMUNIZATION ADMIN: CPT | Performed by: STUDENT IN AN ORGANIZED HEALTH CARE EDUCATION/TRAINING PROGRAM

## 2021-03-17 PROCEDURE — 90750 HZV VACC RECOMBINANT IM: CPT | Performed by: STUDENT IN AN ORGANIZED HEALTH CARE EDUCATION/TRAINING PROGRAM

## 2021-03-17 PROCEDURE — 99214 OFFICE O/P EST MOD 30 MIN: CPT | Mod: 25 | Performed by: STUDENT IN AN ORGANIZED HEALTH CARE EDUCATION/TRAINING PROGRAM

## 2021-03-17 RX ORDER — LATANOPROST 50 UG/ML
SOLUTION/ DROPS OPHTHALMIC
COMMUNITY
Start: 2021-01-06

## 2021-03-17 ASSESSMENT — PATIENT HEALTH QUESTIONNAIRE - PHQ9
1. LITTLE INTEREST OR PLEASURE IN DOING THINGS: NOT AT ALL
6. FEELING BAD ABOUT YOURSELF - OR THAT YOU ARE A FAILURE OR HAVE LET YOURSELF OR YOUR FAMILY DOWN: NOT AL ALL
4. FEELING TIRED OR HAVING LITTLE ENERGY: NOT AT ALL
5. POOR APPETITE OR OVEREATING: NOT AT ALL
3. TROUBLE FALLING OR STAYING ASLEEP OR SLEEPING TOO MUCH: NOT AT ALL
SUM OF ALL RESPONSES TO PHQ QUESTIONS 1-9: 0
SUM OF ALL RESPONSES TO PHQ9 QUESTIONS 1 AND 2: 0
2. FEELING DOWN, DEPRESSED, IRRITABLE, OR HOPELESS: NOT AT ALL
9. THOUGHTS THAT YOU WOULD BE BETTER OFF DEAD, OR OF HURTING YOURSELF: NOT AT ALL
7. TROUBLE CONCENTRATING ON THINGS, SUCH AS READING THE NEWSPAPER OR WATCHING TELEVISION: NOT AT ALL
8. MOVING OR SPEAKING SO SLOWLY THAT OTHER PEOPLE COULD HAVE NOTICED. OR THE OPPOSITE, BEING SO FIGETY OR RESTLESS THAT YOU HAVE BEEN MOVING AROUND A LOT MORE THAN USUAL: NOT AT ALL

## 2021-03-17 ASSESSMENT — FIBROSIS 4 INDEX: FIB4 SCORE: 1.11

## 2021-03-17 NOTE — ASSESSMENT & PLAN NOTE
"Previously tested as \"pre-diabetic\",  But then improved diet, and A1c decreased to normal.   Last A1c - 5.4.  Denies thirst, polyuria, or polydipsia.   -Can check labs periodically.   "

## 2021-03-17 NOTE — ASSESSMENT & PLAN NOTE
Had laminectomy in neck - 2011.   Also with scoliosis and disc issues.   Follows with Miriam Minaya.   Has had injections and ablations.   Uses Tylenol every-other night (prn)  Also on Norco-7.5, (tid-prn) through them.   - Managed by Miriam Minaya.

## 2021-03-17 NOTE — ASSESSMENT & PLAN NOTE
influ vaccine - declines / never  Pneumo Vacc - n/a, not for 4 years...   Tetanus - 2019   Shingles - first one ... 3/17/2021   Colonoscopy - will get cologuard-q3y- ordered....  Mammogram - 2/2021.  Pap - due - will refer to GYN.   Labs < 12 mo / met screen ....

## 2021-03-17 NOTE — PROGRESS NOTES
New to Provider   (but Established with [facility])      Jayashree Anthony is a 61 y.o. female.    Reason to establish: PCP switch  Chief Complaint   Patient presents with   • Establish Care   • Hypertension                Problems addressed this visit:  Details of HPI (subjective) is included in Assessment & Plan, at bottom of note.   Problem   Healthcare Maintenance   Other Specified Glaucoma   Impaired Glucose Tolerance   Hyperlipidemia   Chronic Back Pain Greater Than 3 Months Duration   Hypertension                  Past Medical History:   Diagnosis Date   • Active smoker 4/15/2013   • Arthritis    • Chronic back pain greater than 3 months duration 3/23/2015   • Hypertension    • Hypertension 4/15/2013   • Scoliosis    • Varicose vein        Past Surgical History:   Procedure Laterality Date   • CERVICAL FUSION POSTERIOR  2011    Performed by ROBERT SIGALA at [facility]   • CERVICAL LAMINECTOMY POSTERIOR  2011    Performed by ROBERT SIGALA at [facility]   • OTHER ORTHOPEDIC SURGERY  1968    right  arm orif       Family History   Problem Relation Age of Onset   • Hyperlipidemia Mother    • Heart Disease Mother    • Diabetes Mother    • Thyroid Mother    • Hyperlipidemia Father    • Cancer Father         leukemia due to chemical exposure   • Thyroid Sister        Social History     Tobacco Use   • Smoking status: Former Smoker     Packs/day: 1.00     Years: 31.00     Pack years: 31.00     Types: Cigarettes     Quit date: 2015     Years since quittin.6   • Smokeless tobacco: Never Used   • Tobacco comment: Quit in    (Hx 1 ppd x 31 yrs).    Substance Use Topics   • Alcohol use: Yes     Comment: 4 per week       Current Outpatient Medications   Medication Sig Dispense Refill   • latanoprost (XALATAN) 0.005 % Solution INSTILL 1 DROP INTO BOTH EYES EVERY NIGHT     • lisinopril (PRINIVIL) 20 MG Tab TAKE 1 TABLET BY MOUTH EVERY DAY (Patient taking  "differently: 10 mg.) 90 Tab 1   • atorvastatin (LIPITOR) 20 MG Tab Take 1 Tab by mouth every day. 30 Tab 6   • Cholecalciferol (VITAMIN D3 PO) Take  by mouth.     • HYDROcodone-acetaminophen (NORCO) 7.5-325 MG per tablet Take 1 Tab by mouth every 8 hours as needed.  0   • Misc. Devices Misc Hot tub For alleviating pain and Arthritis discpmfprt 1 Each 0   • acetaminophen (TYLENOL) 500 MG Tab Take 500-1,000 mg by mouth every 6 hours as needed.     • Multiple Vitamins-Minerals (ONE-A-DAY 50 PLUS PO) Take  by mouth every day.       No current facility-administered medications for this visit.       Allergies as of 03/17/2021   • (No Known Allergies)       Review of Symptoms    GEN/CONST:   Denies fever, chills, fatigue, or weight change.    H/E:     Denies blurry vision or eye pain.     + glaucoma, on eye drops.   ENT:   Denies ear pain, difficulty hearing.  CARDIO:   Denies chest pain, palpitations, or edema.    RESP:   Denies shortness of breath, wheezing, or coughing.  GI:    Denies nausea, vomiting, diarrhea, constipation, or abdominal pain.   :   Denies dysuria, hematuria   MSK:  Denies weakness, or muscle aches.       + lipoma on right upper arm.   NEURO:  Denies numbness  or focal weakness.     ENDO:  Denies heat or cold intolerance, polyuria, or polydipsia.  PSYCH:  Denies anxiety, depression, or substance abuse        Physical Exam  /80 (BP Location: Left arm, Patient Position: Sitting, BP Cuff Size: Adult)   Pulse 88   Temp 36.2 °C (97.2 °F) (Temporal)   Resp 16   Ht 1.651 m (5' 5\")   Wt 75.3 kg (166 lb)   LMP 12/28/2011   SpO2 97%   BMI 27.62 kg/m²   General:  Alert and oriented, No apparent distress.  Eyes:  PER   EOMI.  No scleral icterus.    Neck: Supple. No lymphadenopathy noted. Thyroid not enlarged.  Lungs: Clear to auscultation bilaterally.  No wheezes or rales.     Cardiovascular: Regular rate and rhythm.  No murmurs, rubs or gallops.  Abdomen:  Soft.  Non-distended.  Non-tender.   " "  Extremities: No pedal edema.  Good general motion of extremities.    Psychological: Appears to have normal mood and affect.      Labs:  Recent / Prior labs - independently reviewed.     CBC, CMP, Lipids, TSH, A1c and Vit.D                 Health Maintenance Review    influ vaccine - declines / never  Pneumo Vacc - n/a, not for 4 years...   Tetanus - 2019   Shingles - get first one today... 3/17/2021 ...  Colonoscopy - will get cologuard-q3y- ordered....  Mammogram - 2/2021.  Pap - due - will refer to GYN.   Labs < 12 mo / met screen - reviewed / ordered.                 Assessment & Plan  (with subjective history and orders):    Problem List Items Addressed This Visit     Hypertension (Chronic)     Chronic and ongoing HTN.   Had illness a couple months ago, and had low BP.   Did not go to ER or get checked.  She stopped amlodipine, and cut lisinopril in half.   Currently on lisinopril 10 (half of 20 mg).  States at home systolic .  In office - 130/80.   - Will continue on lisinopril 10,  - Recheck in office in couple weeks.  - Bring home BP cuff and log with her to next visit.          Hyperlipidemia (Chronic)     Had HLD with elevated LDL - 171, 4 months ago.   Has been started on Lipitor 20, but no recheck.  Denies muscle aches or pains.  No follow-up labs yet.   - will get new LFT's and Lipids.           Relevant Orders    Comp Metabolic Panel    Lipid Profile    Chronic back pain greater than 3 months duration     Had laminectomy in neck - 2011.   Also with scoliosis and disc issues.   Follows with [facility].   Has had injections and ablations.   Uses Tylenol every-other night (prn)  Also on Norco-7.5, (tid-prn) through them.   - Managed by [facility].          Need for vaccination    Relevant Orders    Shingrix Vaccine (Completed)    Impaired glucose tolerance     Previously tested as \"pre-diabetic\",  But then improved diet, and A1c decreased to normal.   Last A1c - 5.4.  Denies thirst, polyuria, " or polydipsia.   -Can check labs periodically.          Other specified glaucoma     Sees eye doctors regularly.   On eye drops: latanoprost.  Bilateral glaucoma.   Still notes good / reasonable vision.   - Managed by [facility].           Other Visit Diagnoses     Medication management        Relevant Orders    Comp Metabolic Panel    Lipid Profile    Encounter for Papanicolaou smear of cervix        Relevant Orders    REFERRAL TO GYNECOLOGY    Screen for colon cancer        Relevant Orders    COLOGUARD (FIT DNA)                    Diagnosis Table, with orders:  1. Essential hypertension     2. Pure hypercholesterolemia  Comp Metabolic Panel    Lipid Profile   3. Medication management  Comp Metabolic Panel    Lipid Profile   4. Impaired glucose tolerance     5. Chronic back pain greater than 3 months duration     6. Other glaucoma of both eyes     7. Encounter for Papanicolaou smear of cervix  REFERRAL TO GYNECOLOGY   8. Screen for colon cancer  COLOGUARD (FIT DNA)   9. Need for vaccination  Shingrix Vaccine                   Follow-up in 3 weeks - HTN (continue on lisinopril 10).   Also HLD labs, and discuss CT for ex-smoker....              A computerized dictation system may have been used in this note.    Despite review, there may be some errors in spelling or grammar.    Kyle Shanks M.D.  3/17/2021

## 2021-03-17 NOTE — PATIENT INSTRUCTIONS
Please measure your blood pressure at home, and bring a log (list) with you to your next visit.      Please get the labs done a at least a few days prior to your next visit.  Please get them done while fasting (no food, coffee, or juices, for 8 hours - but water is ok).     Thank you.         How to Take Your Blood Pressure  You can take your blood pressure at home with a machine. You may need to check your blood pressure at home:  · To check if you have high blood pressure (hypertension).  · To check your blood pressure over time.  · To make sure your blood pressure medicine is working.  Supplies needed:  You will need a blood pressure machine, or monitor. You can buy one at a [employer] or online. When choosing one:  · Choose one with an arm cuff.  · Choose one that wraps around your upper arm. Only one finger should fit between your arm and the cuff.  · Do not choose one that measures your blood pressure from your wrist or finger.  Your doctor can suggest a monitor.  How to prepare  Avoid these things for 30 minutes before checking your blood pressure:  · Drinking caffeine.  · Drinking alcohol.  · Eating.  · Smoking.  · Exercising.  Five minutes before checking your blood pressure:  · Pee.  · Sit in a dining chair. Avoid sitting in a soft couch or armchair.  · Be quiet. Do not talk.  How to take your blood pressure  Follow the instructions that came with your machine. If you have a digital blood pressure monitor, these may be the instructions:  1. Sit up straight.  2. Place your feet on the floor. Do not cross your ankles or legs.  3. Rest your left arm at the level of your heart. You may rest it on a table, desk, or chair.  4. Pull up your shirt sleeve.  5. Wrap the blood pressure cuff around the upper part of your left arm. The cuff should be 1 inch (2.5 cm) above your elbow. It is best to wrap the cuff around bare skin.  6. Fit the cuff snugly around your arm. You should be able to place only one finger  "between the cuff and your arm.  7. Put the cord inside the groove of your elbow.  8. Press the power button.  9. Sit quietly while the cuff fills with air and loses air.  10. Write down the numbers on the screen.  11. Wait 2-3 minutes and then repeat steps 1-10.  What do the numbers mean?  Two numbers make up your blood pressure. The first number is called systolic pressure. The second is called diastolic pressure. An example of a blood pressure reading is \"120 over 80\" (or 120/80).  If you are an adult and do not have a medical condition, use this guide to find out if your blood pressure is normal:  Normal  · First number: below 120.  · Second number: below 80.  Elevated  · First number: 120-129.  · Second number: below 80.  Hypertension stage 1  · First number: 130-139.  · Second number: 80-89.  Hypertension stage 2  · First number: 140 or above.  · Second number: 90 or above.  Your blood pressure is above normal even if only the top or bottom number is above normal.  Follow these instructions at home:  · Check your blood pressure as often as your doctor tells you to.  · Take your monitor to your next doctor's appointment. Your doctor will:  ? Make sure you are using it correctly.  ? Make sure it is working right.  · Make sure you understand what your blood pressure numbers should be.  · Tell your doctor if your medicines are causing side effects.  Contact a doctor if:  · Your blood pressure keeps being high.  Get help right away if:  · Your first blood pressure number is higher than 180.  · Your second blood pressure number is higher than 120.  This information is not intended to replace advice given to you by your health care provider. Make sure you discuss any questions you have with your health care provider.  Document Released: 11/30/2009 Document Revised: 11/30/2018 Document Reviewed: 05/26/2017  Elsevier Patient Education © 2020 Elsevier Inc.    Blood Pressure Record Sheet  To take your blood pressure, you " will need a blood pressure machine. You can buy a blood pressure machine (blood pressure monitor) at your clinic, drug store, or online. When choosing one, consider:  · An automatic monitor that has an arm cuff.  · A cuff that wraps snugly around your upper arm. You should be able to fit only one finger between your arm and the cuff.  · A device that stores blood pressure reading results.  · Do not choose a monitor that measures your blood pressure from your wrist or finger.  Follow your health care provider's instructions for how to take your blood pressure. To use this form:  · Get one reading in the morning (a.m.) before you take any medicines.  · Get one reading in the evening (p.m.) before supper.  · Take at least 2 readings with each blood pressure check. This makes sure the results are correct. Wait 1-2 minutes between measurements.  · Write down the results in the spaces on this form.  · Repeat this once a week, or as told by your health care provider.  · Make a follow-up appointment with your health care provider to discuss the results.  Blood pressure log  Date: _______________________  · a.m. _____________________(1st reading) _____________________(2nd reading)  · p.m. _____________________(1st reading) _____________________(2nd reading)  Date: _______________________  · a.m. _____________________(1st reading) _____________________(2nd reading)  · p.m. _____________________(1st reading) _____________________(2nd reading)  Date: _______________________  · a.m. _____________________(1st reading) _____________________(2nd reading)  · p.m. _____________________(1st reading) _____________________(2nd reading)  Date: _______________________  · a.m. _____________________(1st reading) _____________________(2nd reading)  · p.m. _____________________(1st reading) _____________________(2nd reading)  Date: _______________________  · a.m. _____________________(1st reading) _____________________(2nd reading)  · p.m.  _____________________(1st reading) _____________________(2nd reading)  This information is not intended to replace advice given to you by your health care provider. Make sure you discuss any questions you have with your health care provider.  Document Released: 09/15/2004 Document Revised: 02/15/2019 Document Reviewed: 12/18/2018  Elsevier Patient Education © 2020 Elsevier Inc.

## 2021-03-17 NOTE — ASSESSMENT & PLAN NOTE
Sees eye doctors regularly.   On eye drops: latanoprost.  Bilateral glaucoma.   Still notes good / reasonable vision.   - Managed by [facility].

## 2021-03-17 NOTE — ASSESSMENT & PLAN NOTE
Chronic and ongoing HTN.   Had illness a couple months ago, and had low BP.   Did not go to ER or get checked.  She stopped amlodipine, and cut lisinopril in half.   Currently on lisinopril 10 (half of 20 mg).  States at home systolic .  In office - 130/80.   - Will continue on lisinopril 10,  - Recheck in office in couple weeks.  - Bring home BP cuff and log with her to next visit.

## 2021-03-18 ENCOUNTER — HOSPITAL ENCOUNTER (OUTPATIENT)
Dept: LAB | Facility: MEDICAL CENTER | Age: 61
End: 2021-03-18
Attending: STUDENT IN AN ORGANIZED HEALTH CARE EDUCATION/TRAINING PROGRAM
Payer: COMMERCIAL

## 2021-03-18 DIAGNOSIS — E78.00 PURE HYPERCHOLESTEROLEMIA: Chronic | ICD-10-CM

## 2021-03-18 DIAGNOSIS — Z79.899 MEDICATION MANAGEMENT: ICD-10-CM

## 2021-03-18 LAB
ALBUMIN SERPL BCP-MCNC: 4.5 G/DL (ref 3.2–4.9)
ALBUMIN/GLOB SERPL: 1.6 G/DL
ALP SERPL-CCNC: 68 U/L (ref 30–99)
ALT SERPL-CCNC: 31 U/L (ref 2–50)
ANION GAP SERPL CALC-SCNC: 9 MMOL/L (ref 7–16)
AST SERPL-CCNC: 31 U/L (ref 12–45)
BILIRUB SERPL-MCNC: 0.5 MG/DL (ref 0.1–1.5)
BUN SERPL-MCNC: 24 MG/DL (ref 8–22)
CALCIUM SERPL-MCNC: 10.2 MG/DL (ref 8.5–10.5)
CHLORIDE SERPL-SCNC: 101 MMOL/L (ref 96–112)
CHOLEST SERPL-MCNC: 157 MG/DL (ref 100–199)
CO2 SERPL-SCNC: 25 MMOL/L (ref 20–33)
CREAT SERPL-MCNC: 0.87 MG/DL (ref 0.5–1.4)
FASTING STATUS PATIENT QL REPORTED: NORMAL
GLOBULIN SER CALC-MCNC: 2.8 G/DL (ref 1.9–3.5)
GLUCOSE SERPL-MCNC: 82 MG/DL (ref 65–99)
HDLC SERPL-MCNC: 52 MG/DL
LDLC SERPL CALC-MCNC: 84 MG/DL
POTASSIUM SERPL-SCNC: 4.3 MMOL/L (ref 3.6–5.5)
PROT SERPL-MCNC: 7.3 G/DL (ref 6–8.2)
SODIUM SERPL-SCNC: 135 MMOL/L (ref 135–145)
TRIGL SERPL-MCNC: 107 MG/DL (ref 0–149)

## 2021-03-18 PROCEDURE — 36415 COLL VENOUS BLD VENIPUNCTURE: CPT

## 2021-03-18 PROCEDURE — 80061 LIPID PANEL: CPT

## 2021-03-18 PROCEDURE — 80053 COMPREHEN METABOLIC PANEL: CPT

## 2021-04-13 ENCOUNTER — OFFICE VISIT (OUTPATIENT)
Dept: MEDICAL GROUP | Facility: PHYSICIAN GROUP | Age: 61
End: 2021-04-13
Payer: COMMERCIAL

## 2021-04-13 VITALS
TEMPERATURE: 97.4 F | HEART RATE: 72 BPM | BODY MASS INDEX: 27.99 KG/M2 | HEIGHT: 65 IN | DIASTOLIC BLOOD PRESSURE: 70 MMHG | WEIGHT: 168 LBS | SYSTOLIC BLOOD PRESSURE: 112 MMHG | OXYGEN SATURATION: 95 %

## 2021-04-13 DIAGNOSIS — I10 ESSENTIAL HYPERTENSION: Chronic | ICD-10-CM

## 2021-04-13 DIAGNOSIS — Z87.891 FORMER SMOKER: ICD-10-CM

## 2021-04-13 DIAGNOSIS — Z12.2 ENCOUNTER FOR SCREENING FOR LUNG CANCER: ICD-10-CM

## 2021-04-13 DIAGNOSIS — E78.00 PURE HYPERCHOLESTEROLEMIA: Chronic | ICD-10-CM

## 2021-04-13 PROBLEM — Z12.39 ENCOUNTER FOR SCREENING FOR MALIGNANT NEOPLASM OF BREAST: Status: RESOLVED | Noted: 2019-08-15 | Resolved: 2021-04-13

## 2021-04-13 PROCEDURE — 99214 OFFICE O/P EST MOD 30 MIN: CPT | Performed by: STUDENT IN AN ORGANIZED HEALTH CARE EDUCATION/TRAINING PROGRAM

## 2021-04-13 RX ORDER — LISINOPRIL 10 MG/1
10 TABLET ORAL DAILY
Qty: 90 TABLET | Refills: 2 | Status: SHIPPED | OUTPATIENT
Start: 2021-04-13 | End: 2022-01-03

## 2021-04-13 ASSESSMENT — FIBROSIS 4 INDEX: FIB4 SCORE: 1.2

## 2021-04-13 NOTE — ASSESSMENT & PLAN NOTE
Former smoker - Quit 2015.  Hx of 1 ppd x 31 yrs.   Has never had lung cancer screening.   - will refer to screening program.

## 2021-04-13 NOTE — ASSESSMENT & PLAN NOTE
Had HLD with elevated LDL - 171,previously  Has been started on Lipitor 20,  New Labs with improvement - LDL - 84.  Denies muscle aches or pains, or confusions.   Normal LFT's.   - continue on Lipitor 20.

## 2021-04-13 NOTE — PROGRESS NOTES
Established Patient     Jayashree Anthony is a 61 y.o. female.    Chief Complaint   Patient presents with   • Hypertension     follow up on bp               Problems addressed this visit:     This note uses problem-based documentation.  Subjective HPI is included in Assessment & Plan, at bottom of note.   Problem   Former Smoker   Hyperlipidemia   Hypertension                            Past Medical History:   Diagnosis Date   • Arthritis    • Chronic back pain greater than 3 months duration 3/23/2015   • Former smoker    • Glaucoma     sees Ophth.   • Hypertension    • Hypertension 4/15/2013   • Scoliosis    • Varicose vein        Patient Active Problem List   Diagnosis   • Hypertension   • Chronic back pain greater than 3 months duration   • Dysthymia   • Need for vaccination   • Hyperlipidemia   • Impaired glucose tolerance   • Vitamin D deficiency   • Lipoma   • Healthcare maintenance   • Other specified glaucoma   • Former smoker       Past Surgical History:   Procedure Laterality Date   • CERVICAL FUSION POSTERIOR  2011    Performed by ROBERT SIGALA at SURGERY [facility]   • CERVICAL LAMINECTOMY POSTERIOR  2011    Performed by ROBERT SIGALA at SURGERY [facility] ORS   • OTHER ORTHOPEDIC SURGERY  1968    right  arm orif       Family History   Problem Relation Age of Onset   • Hyperlipidemia Mother    • Heart Disease Mother    • Diabetes Mother    • Thyroid Mother    • Hyperlipidemia Father    • Cancer Father         leukemia due to chemical exposure   • Thyroid Sister        Social History     Tobacco Use   • Smoking status: Former Smoker     Packs/day: 1.00     Years: 31.00     Pack years: 31.00     Types: Cigarettes     Quit date: 2015     Years since quittin.7   • Smokeless tobacco: Never Used   • Tobacco comment: Quit in    (Hx 1 ppd x 31 yrs).    Substance Use Topics   • Alcohol use: Yes     Comment: 4 per week       Current Outpatient Medications   Medication Sig Dispense  "Refill   • lisinopril (PRINIVIL) 10 MG Tab Take 1 tablet by mouth every day. 90 tablet 2   • latanoprost (XALATAN) 0.005 % Solution INSTILL 1 DROP INTO BOTH EYES EVERY NIGHT     • atorvastatin (LIPITOR) 20 MG Tab Take 1 Tab by mouth every day. 30 Tab 6   • Cholecalciferol (VITAMIN D3 PO) Take  by mouth.     • HYDROcodone-acetaminophen (NORCO) 7.5-325 MG per tablet Take 1 Tab by mouth every 8 hours as needed.  0   • Misc. Devices Misc Hot tub For alleviating pain and Arthritis discpmfprt 1 Each 0   • acetaminophen (TYLENOL) 500 MG Tab Take 500-1,000 mg by mouth every 6 hours as needed.     • Multiple Vitamins-Minerals (ONE-A-DAY 50 PLUS PO) Take  by mouth every day.       No current facility-administered medications for this visit.       Allergies as of 04/13/2021   • (No Known Allergies)                 Review of Symptoms   (as noted elsewhere, and .....)   GEN/CONST:   Denies fever, chills, fatigue,   CARDIO:   Denies chest pain, palpitations, or edema.    RESP:   Denies shortness of breath, wheezing, or coughing.  GI:    Denies nausea, vomiting, diarrhea,     :   Denies dysuria, hematuria,    MSK:  Denies joint pains  or muscle aches.    NEURO:  Denies numbness or focal weakness.       PSYCH:  Denies anxiety, depression, or substance abuse        Physical Exam  /70 (BP Location: Right arm, Patient Position: Sitting, BP Cuff Size: Adult)   Pulse 72   Temp 36.3 °C (97.4 °F) (Temporal)   Ht 1.651 m (5' 5\")   Wt 76.2 kg (168 lb)   LMP 12/28/2011   SpO2 95%   BMI 27.96 kg/m²   General:  Alert and oriented, No apparent distress.    Lungs: Clear to auscultation bilaterally.  No wheezes or rales.  Cardiovascular: Regular rate and rhythm.  No murmurs, rubs or gallops.  Abdomen:  Soft.  Non-tender.  No rebound or guarding.   Extremities:  No pedal edema.  Good general motion of extremities.   Psychological: Appears to have normal mood and affect.        Labs:  Recent / Prior labs reviewed.    CMP and Lipids "                             Assessment & Plan  (with subjective history and orders):    Problem List Items Addressed This Visit     Former smoker     Former smoker - Quit 2015.  Hx of 1 ppd x 31 yrs.   Has never had lung cancer screening.   - will refer to screening program.          Relevant Orders    REFERRAL TO LUNG CANCER SCREENING PROGRAM    Hyperlipidemia     Had HLD with elevated LDL - 171,previously  Has been started on Lipitor 20,  New Labs with improvement - LDL - 84.  Denies muscle aches or pains, or confusions.   Normal LFT's.   - continue on Lipitor 20.          Relevant Medications    lisinopril (PRINIVIL) 10 MG Tab    Hypertension     Chronic and ongoing HTN.   Previously had stopped amlodipine, and cut lisinopril in half.   Currently on lisinopril 10 (half of 20 mg).  Home BP list ~100/70 (avg), ???  but machine checked and much higher than the office machine.  In office - 112/70  - Will continue on lisinopril 10,  - get new home BP cuff and log with her to next visit.   - f/u in several months.          Relevant Medications    lisinopril (PRINIVIL) 10 MG Tab      Other Visit Diagnoses     Encounter for screening for lung cancer        Relevant Orders    REFERRAL TO LUNG CANCER SCREENING PROGRAM        Of note, the above list is not in a specific nor sortable order.                   Diagnosis Table, with orders:  1. Essential hypertension  lisinopril (PRINIVIL) 10 MG Tab   2. Pure hypercholesterolemia     3. Former smoker  REFERRAL TO LUNG CANCER SCREENING PROGRAM   4. Encounter for screening for lung cancer  REFERRAL TO LUNG CANCER SCREENING PROGRAM                 Follow-up:  3 months:  HTN.               A computerized dictation system may have been used in this note.    Despite review, there may be some errors in spelling or grammar.    Kyle Shanks M.D.  4/13/2021

## 2021-04-13 NOTE — PATIENT INSTRUCTIONS
Please return in mid-July, to follow-up on your blood pressure.     Please keep taking the lisinopril 10 (new prescription sent to the pharmacy).   If any lightheadedness, dizziness, or low BP (<100/60), then stop taking it, and make new appointment.      Thank you.

## 2021-04-13 NOTE — ASSESSMENT & PLAN NOTE
Chronic and ongoing HTN.   Previously had stopped amlodipine, and cut lisinopril in half.   Currently on lisinopril 10 (half of 20 mg).  Home BP list ~100/70 (avg), ???  but machine checked and much higher than the office machine.  In office - 112/70  - Will continue on lisinopril 10,  - get new home BP cuff and log with her to next visit.   - f/u in several months.

## 2021-04-19 ENCOUNTER — TELEPHONE (OUTPATIENT)
Dept: HEMATOLOGY ONCOLOGY | Facility: MEDICAL CENTER | Age: 61
End: 2021-04-19

## 2021-04-19 NOTE — TELEPHONE ENCOUNTER
Received referral to lung cancer screening program.  Chart review to assess for lung cancer screening program eligibility.   1. Age 55-77 yrs of age? Yes 61 y.o.  2. 30 pack year hx of smoking, or greater? Yes 1 [id number] hx  3. Current smoker or if quit, has pt quit within last 15 yrs?Yes  Quit 7/14/2015  4. Any signs or symptoms of lung cancer? None noted  5. Previous history of lung cancer? None noted  6. Chest CT within past 12 mos.? None noted  Patient does meet eligibility criteria. [facility] scheduling notified to schedule the shared decision making visit.

## 2021-05-12 ENCOUNTER — OFFICE VISIT (OUTPATIENT)
Dept: HEMATOLOGY ONCOLOGY | Facility: MEDICAL CENTER | Age: 61
End: 2021-05-12
Payer: COMMERCIAL

## 2021-05-12 VITALS
BODY MASS INDEX: 26.93 KG/M2 | RESPIRATION RATE: 18 BRPM | SYSTOLIC BLOOD PRESSURE: 118 MMHG | WEIGHT: 167.55 LBS | OXYGEN SATURATION: 98 % | HEART RATE: 68 BPM | HEIGHT: 66 IN | TEMPERATURE: 98.2 F | DIASTOLIC BLOOD PRESSURE: 82 MMHG

## 2021-05-12 DIAGNOSIS — Z87.891 PERSONAL HISTORY OF NICOTINE DEPENDENCE: ICD-10-CM

## 2021-05-12 PROCEDURE — G0296 VISIT TO DETERM LDCT ELIG: HCPCS | Performed by: NURSE PRACTITIONER

## 2021-05-12 RX ORDER — CHLORAL HYDRATE 500 MG
1000 CAPSULE ORAL
COMMUNITY

## 2021-05-12 ASSESSMENT — ENCOUNTER SYMPTOMS
COUGH: 0
WHEEZING: 0
SHORTNESS OF BREATH: 0
WEIGHT LOSS: 0

## 2021-05-12 ASSESSMENT — PAIN SCALES - GENERAL: PAINLEVEL: 5=MODERATE PAIN

## 2021-05-12 ASSESSMENT — FIBROSIS 4 INDEX: FIB4 SCORE: 1.2

## 2021-05-12 NOTE — PROGRESS NOTES
Subjective:      Jayashree Anthony is a 61 y.o. female who presents with Lung Cancer Screening Program Prescreen ([facility]/Kyle Shanks) for lung cancer screening shared decision making visit.           HPI    Patient seen today for initial lung cancer screening visit. Patient referred by her PCP Dr. Shanks.     The patient meets eligibility criteria including age, smoking history (30+ pack years), if former smoker, quit in the last 15 years, and absence of signs or symptoms of lung cancer.    - Age - 61  - Smoking history - Patient has smoked for 34 years at an average of 1 ppd = 34 pack year smoking history.  - Current smoking status - Former smoker, quit in 2015 - 6 years ago  - No symptoms of lung cancer and no previous history of lung cancer     No Known Allergies  Current Outpatient Medications on File Prior to Visit   Medication Sig Dispense Refill   • Omega-3 Fatty Acids (FISH OIL) 1000 MG Cap capsule Take 1,000 mg by mouth 3 times a day with meals.     • lisinopril (PRINIVIL) 10 MG Tab Take 1 tablet by mouth every day. 90 tablet 2   • latanoprost (XALATAN) 0.005 % Solution INSTILL 1 DROP INTO BOTH EYES EVERY NIGHT     • atorvastatin (LIPITOR) 20 MG Tab Take 1 Tab by mouth every day. 30 Tab 6   • Cholecalciferol (VITAMIN D3 PO) Take  by mouth.     • HYDROcodone-acetaminophen (NORCO) 7.5-325 MG per tablet Take 1 Tab by mouth every 8 hours as needed.  0   • Misc. Devices Misc Hot tub For alleviating pain and Arthritis discpmfprt 1 Each 0   • acetaminophen (TYLENOL) 500 MG Tab Take 500-1,000 mg by mouth every 6 hours as needed.     • Multiple Vitamins-Minerals (ONE-A-DAY 50 PLUS PO) Take  by mouth every day.       No current facility-administered medications on file prior to visit.       Review of Systems   Constitutional: Negative for malaise/fatigue and weight loss.   Respiratory: Negative for cough, shortness of breath and wheezing.           Objective:     /82   Pulse 68   Temp 36.8 °C  "(98.2 °F) (Temporal)   Resp 18   Ht 1.676 m (5' 6\")   Wt 76 kg (167 lb 8.8 oz)   LMP 12/28/2011   SpO2 98%   BMI 27.04 kg/m²      Physical Exam  Vitals reviewed.   Constitutional:       General: She is not in acute distress.     Appearance: Normal appearance. She is well-developed. She is not diaphoretic.   HENT:      Head: Normocephalic and atraumatic.   Cardiovascular:      Rate and Rhythm: Normal rate and regular rhythm.      Heart sounds: Normal heart sounds. No murmur heard.   No friction rub. No gallop.    Pulmonary:      Effort: Pulmonary effort is normal. No respiratory distress.      Breath sounds: Normal breath sounds. No wheezing.   Musculoskeletal:         General: Normal range of motion.   Skin:     General: Skin is warm and dry.   Neurological:      Mental Status: She is alert and oriented to person, place, and time.                 Assessment/Plan:        1. Personal history of nicotine dependence  CT-LUNG CANCER-SCREENING       We conducted a shared decision-making process using a decision aid. We reviewed benefits and harms of screening, including false positives and potential need for additional diagnostic testing, the possibility of over diagnosis, and total radiation exposure.    We discussed the importance of adhering to annual LDCT screening. We also discussed the impact of comorbities on the patient's the ability or willingness to undergo diagnostic procedure(s) and treatment.    Counseling on the importance of maintaining cigarette smoking abstinence if former smoker; or the importance of smoking cessation if current smoker and, if appropriate, furnishing of information about tobacco cessation interventions.    Based on our discussion, we have decided to begin annual lung cancer screening starting now.    "

## 2021-05-21 ENCOUNTER — HOSPITAL ENCOUNTER (OUTPATIENT)
Dept: RADIOLOGY | Facility: MEDICAL CENTER | Age: 61
End: 2021-05-21
Attending: NURSE PRACTITIONER
Payer: COMMERCIAL

## 2021-05-21 DIAGNOSIS — Z87.891 PERSONAL HISTORY OF NICOTINE DEPENDENCE: ICD-10-CM

## 2021-05-21 PROCEDURE — 71271 CT THORAX LUNG CANCER SCR C-: CPT

## 2021-05-24 ENCOUNTER — TELEPHONE (OUTPATIENT)
Dept: HEMATOLOGY ONCOLOGY | Facility: MEDICAL CENTER | Age: 61
End: 2021-05-24

## 2021-05-24 NOTE — TELEPHONE ENCOUNTER
Called pt to review LDCT results, pt did not answer.  Left detailed message with contact info & to return call at earliest convenience.    For Staff:  Please transfer to RN (or APRN, if RN unavailable)    RN:  Review LDCT (RADS 1) results from 5/21/21 with pt.  Route encounter to Yany WATTS & pt's referring provider, Kyle Shanks M.D.

## 2021-05-24 NOTE — TELEPHONE ENCOUNTER
Patient recently seen for lung cancer screening.  She completed lung cancer screening CT on 5/21/2021.  CT was negative for any abnormal finding.  Lung RADS one, continue annual screening per screening requirements.  Patient to follow-up with PCP for all further screening.      CT-LUNG CANCER-SCREENING    Result Date: 5/21/2021 5/21/2021 3:44 PM HISTORY/REASON FOR EXAM:  Lung cancer screening. 34 pack-year history of smoking. TECHNIQUE/EXAM DESCRIPTION AND NUMBER OF VIEWS: Lung cancer screening without contrast. Low dose noncontrast helical images were obtained of the chest from the lung apices through the costophrenic sulci utilizing thin collimation and intervals with reconstructed images sent to PACS in axial, coronal and sagittal planes. Low dose optimization technique was utilized for this CT exam including automated exposure control and adjustment of the mA and/or kV according to patient size. COMPARISON: None. FINDINGS: The lung parenchyma is clear. The heart is normal in size and configuration. Coronary artery calcifications are present. There is no mediastinal, hilar, or axillary adenopathy identified in this unenhanced exam. No pleural effusion is present. No adrenal enlargement or hydronephrosis is visualized.     1.  Negative CT evaluation of the thorax. 2.  No thoracic adenopathy or pleural effusion. Lung RADS: 1 - Negative: No nodules and definitely benign nodules Findings: no lung nodules nodule(s) with specific calcifications: complete, central, popcorn, concentric rings and fat containing nodules Management: Continue annual screening with LDCT in 12 months

## 2021-05-24 NOTE — TELEPHONE ENCOUNTER
Phoned patient with results of LDCT exam performed 5/21/2021 .    Notified her that the results showed no lung nodules nor masses.  Recommend follow up CT in 12 months.    Informed patient of incidental findings of---  Coronary artery calcifications are present.  with recommendation to follow up with PCP.    Patient agrees to all recommendations.    Referring provider, Kyle Shanks notified of results and incidental findings via this communication.    Health maintenance updated and patient sent lung cancer screening result letter.

## 2021-05-26 RX ORDER — ATORVASTATIN CALCIUM 20 MG/1
TABLET, FILM COATED ORAL
Qty: 90 TABLET | Refills: 3 | Status: SHIPPED | OUTPATIENT
Start: 2021-05-26 | End: 2022-05-16

## 2021-05-26 NOTE — TELEPHONE ENCOUNTER
Pt has had OV within the 12 month protocol and lipid panel is current. 12 month supply sent to pharmacy.   Lab Results   Component Value Date/Time    CHOLSTRLTOT 157 03/18/2021 06:06 AM    LDL 84 03/18/2021 06:06 AM    HDL 52 03/18/2021 06:06 AM    TRIGLYCERIDE 107 03/18/2021 06:06 AM       Lab Results   Component Value Date/Time    SODIUM 135 03/18/2021 06:06 AM    POTASSIUM 4.3 03/18/2021 06:06 AM    CHLORIDE 101 03/18/2021 06:06 AM    CO2 25 03/18/2021 06:06 AM    GLUCOSE 82 03/18/2021 06:06 AM    BUN 24 (H) 03/18/2021 06:06 AM    CREATININE 0.87 03/18/2021 06:06 AM     Lab Results   Component Value Date/Time    ALKPHOSPHAT 68 03/18/2021 06:06 AM    ASTSGOT 31 03/18/2021 06:06 AM    ALTSGPT 31 03/18/2021 06:06 AM    TBILIRUBIN 0.5 03/18/2021 06:06 AM

## 2022-06-20 ENCOUNTER — OFFICE VISIT (OUTPATIENT)
Dept: MEDICAL GROUP | Facility: PHYSICIAN GROUP | Age: 62
End: 2022-06-20
Payer: COMMERCIAL

## 2022-06-20 VITALS
HEART RATE: 81 BPM | SYSTOLIC BLOOD PRESSURE: 122 MMHG | HEIGHT: 65 IN | DIASTOLIC BLOOD PRESSURE: 80 MMHG | OXYGEN SATURATION: 97 % | RESPIRATION RATE: 17 BRPM | WEIGHT: 183 LBS | BODY MASS INDEX: 30.49 KG/M2 | TEMPERATURE: 98 F

## 2022-06-20 DIAGNOSIS — I10 PRIMARY HYPERTENSION: ICD-10-CM

## 2022-06-20 DIAGNOSIS — E55.9 VITAMIN D DEFICIENCY: ICD-10-CM

## 2022-06-20 DIAGNOSIS — Z87.891 HISTORY OF CIGARETTE SMOKING: ICD-10-CM

## 2022-06-20 DIAGNOSIS — Z87.891 FORMER SMOKER: ICD-10-CM

## 2022-06-20 DIAGNOSIS — G89.29 CHRONIC BACK PAIN GREATER THAN 3 MONTHS DURATION: ICD-10-CM

## 2022-06-20 DIAGNOSIS — Z76.89 ENCOUNTER TO ESTABLISH CARE: ICD-10-CM

## 2022-06-20 DIAGNOSIS — Z12.31 ENCOUNTER FOR SCREENING MAMMOGRAM FOR BREAST CANCER: ICD-10-CM

## 2022-06-20 DIAGNOSIS — E66.9 OBESITY (BMI 30-39.9): ICD-10-CM

## 2022-06-20 DIAGNOSIS — H40.89 OTHER GLAUCOMA OF BOTH EYES: ICD-10-CM

## 2022-06-20 DIAGNOSIS — E78.00 PURE HYPERCHOLESTEROLEMIA: ICD-10-CM

## 2022-06-20 DIAGNOSIS — M54.9 CHRONIC BACK PAIN GREATER THAN 3 MONTHS DURATION: ICD-10-CM

## 2022-06-20 PROBLEM — Z23 NEED FOR VACCINATION: Status: RESOLVED | Noted: 2019-08-15 | Resolved: 2022-06-20

## 2022-06-20 PROBLEM — Z00.00 HEALTHCARE MAINTENANCE: Status: RESOLVED | Noted: 2021-03-17 | Resolved: 2022-06-20

## 2022-06-20 PROBLEM — F34.1 DYSTHYMIA: Status: RESOLVED | Noted: 2018-01-18 | Resolved: 2022-06-20

## 2022-06-20 PROCEDURE — 99214 OFFICE O/P EST MOD 30 MIN: CPT | Performed by: NURSE PRACTITIONER

## 2022-06-20 ASSESSMENT — PATIENT HEALTH QUESTIONNAIRE - PHQ9: CLINICAL INTERPRETATION OF PHQ2 SCORE: 0

## 2022-06-20 ASSESSMENT — FIBROSIS 4 INDEX: FIB4 SCORE: 1.22

## 2022-06-20 NOTE — ASSESSMENT & PLAN NOTE
She is taking Lisinopril 10 mg daily. She checks her blood pressure intermittently at home.  She reports that her home blood pressures range 120's /60's. Reports was on a second medication but was able to get off the second medication with exercise. She is exercising 4 times a week. No new chest pain, shortness of breath, dizziness, blurry vision or headache.

## 2022-06-20 NOTE — ASSESSMENT & PLAN NOTE
She is taking atorvastatin 20 mg daily and Omega Red daily. She is exercising 4 times a week. During the week she eats grits, cottage cheese, salads with protein at lunch and evening snacks on bowl of cereal or pork rinds. She gets up around 4:00 a.m. every morning. In the afternoons she is going to the gym for 20 minutes.  Reports decreased energy in the afternoons.

## 2022-06-20 NOTE — PROGRESS NOTES
Subjective:     CC:  Diagnoses of Encounter to establish care, Primary hypertension, Pure hypercholesterolemia, Chronic back pain greater than 3 months duration, Other glaucoma of both eyes, Vitamin D deficiency, Former smoker, History of tobacco abuse (Quit >6 mos ago), Encounter for screening mammogram for breast cancer, and Obesity (BMI 30-39.9) were pertinent to this visit.    HISTORY OF THE PRESENT ILLNESS: Patient is a 62 y.o. female. This pleasant patient is here today to establish care and discuss the following. Her prior PCP was Dr. Kyle Shanks.    Chronic back pain greater than 3 months duration  She is followed by [facility]. She is currently taking hydrocodone.  Reports history of nerve ablations and steroid injections. Hx of scoliosis and arthritis.  Exercises 4 times a week, using treadmill and weights. Takes 1 hour in the morning to get moving.     Hypertension  She is taking Lisinopril 10 mg daily. She checks her blood pressure intermittently at home.  She reports that her home blood pressures range 120's /60's. Reports was on a second medication but was able to get off the second medication with exercise. She is exercising 4 times a week. No new chest pain, shortness of breath, dizziness, blurry vision or headache.    Hyperlipidemia  She is taking atorvastatin 20 mg daily and Omega Red daily. She is exercising 4 times a week. During the week she eats grits, cottage cheese, salads with protein at lunch and evening snacks on bowl of cereal or pork rinds. She gets up around 4:00 a.m. every morning. In the afternoons she is going to the gym for 20 minutes.  Reports decreased energy in the afternoons.    Vitamin D deficiency  She is taking daily vitamin D3 supplementation.     Other specified glaucoma  Followed by [facility]. She is taking latanoprost eyedrops to both eyes.    Obesity (BMI 30-39.9)  BMI today is 30.45 kg/m².  She exercises times weekly with both treadmill and weight  training.      Allergies: Patient has no known allergies.    Current Outpatient Medications Ordered in Epic   Medication Sig Dispense Refill   • COENZYME Q10 PO Take  by mouth every day.     • atorvastatin (LIPITOR) 20 MG Tab TAKE 1 TABLET BY MOUTH EVERY DAY 90 Tablet 0   • lisinopril (PRINIVIL) 10 MG Tab TAKE 1 TABLET BY MOUTH EVERY DAY 90 Tablet 1   • Omega-3 Fatty Acids (FISH OIL) 1000 MG Cap capsule Take 1,000 mg by mouth 3 times a day with meals.     • latanoprost (XALATAN) 0.005 % Solution INSTILL 1 DROP INTO BOTH EYES EVERY NIGHT     • Cholecalciferol (VITAMIN D3 PO) Take  by mouth.     • HYDROcodone-acetaminophen (NORCO) 7.5-325 MG per tablet Take 1 Tab by mouth every 8 hours as needed.  0   • Misc. Devices Misc Hot tub For alleviating pain and Arthritis discpmfprt 1 Each 0   • acetaminophen (TYLENOL) 500 MG Tab Take 500-1,000 mg by mouth every 6 hours as needed.     • Multiple Vitamins-Minerals (ONE-A-DAY 50 PLUS PO) Take  by mouth every day.       No current [facility]-ordered facility-administered medications on file.       Past Medical History:   Diagnosis Date   • Arthritis    • Chronic back pain greater than 3 months duration 3/23/2015   • Former smoker    • Glaucoma     sees Ophth.   • Healthcare maintenance 3/17/2021   • Hypertension    • Hypertension 4/15/2013   • Scoliosis    • Varicose vein        Past Surgical History:   Procedure Laterality Date   • CERVICAL FUSION POSTERIOR  2011    Performed by ROBERT SIGALA at SURGERY [facility] ORS   • CERVICAL LAMINECTOMY POSTERIOR  2011    Performed by ROBERT SIGALA at SURGERY [facility]   • OTHER ORTHOPEDIC SURGERY  1968    right  arm orif       Social History     Tobacco Use   • Smoking status: Former Smoker     Packs/day: 1.00     Years: 34.00     Pack years: 34.00     Types: Cigarettes     Quit date: 2015     Years since quittin.9   • Smokeless tobacco: Never Used   • Tobacco comment: Quit in    (Hx 1 ppd x 34 yrs).    Vaping  "Use   • Vaping Use: Former   Substance Use Topics   • Alcohol use: Yes     Comment: 4 per week; wine and vodka/seltzer   • Drug use: No       Social History     Social History Narrative   • Not on file       Family History   Problem Relation Age of Onset   • Hyperlipidemia Mother    • Heart Disease Mother    • Diabetes Mother    • Thyroid Mother    • Hyperlipidemia Father    • Cancer Father         leukemia due to chemical exposure   • Thyroid Sister         benign thyroid tumor   • Cancer Maternal Grandmother         skin   • Pulmonary Embolism Maternal Grandmother    • Arterial Aneurysm Maternal Grandfather    • Heart Disease Paternal Grandmother         d/t meningitis complications   • Cancer Paternal Grandfather         colon   • No Known Problems Daughter    • No Known Problems Daughter    • Heart defect Daughter    • Heart defect Daughter    • Hyperlipidemia Brother        Health Maintenance: Due for COVID-vaccine, Pap smear, zoster vaccine, mammogram and lung cancer screening.        Objective:     Vital signs reviewed   Exam: /80 (BP Location: Right arm, Patient Position: Sitting, BP Cuff Size: Adult)   Pulse 81   Temp 36.7 °C (98 °F) (Temporal)   Resp 17   Ht 1.651 m (5' 5\")   Wt 83 kg (183 lb)   SpO2 97%  Body mass index is 30.45 kg/m².    Gen: Alert and oriented, No apparent distress.  Neck: Neck is supple without lymphadenopathy.  Lungs: Normal effort, CTA bilaterally, no wheezes, rhonchi, or rales.    CV: Regular rate and rhythm. No murmurs, rubs, or gallops.  Ext: No clubbing, cyanosis, edema      Assessment & Plan:   62 y.o. female with the following -    1. Encounter to establish care  Acute uncomplicated problem.  Care established today.  She is interested in establishing care with gynecology, new referral placed today.  Would appreciate their assistance with her annual Pap smears.  - Referral to Gynecology    2. Primary hypertension  Chronic stable problem.  Blood pressure is at goal " today.  She will continue with her lisinopril 10 mg daily.  She will continue with home blood pressure monitoring.  Due for annual labs.  [facility] and we discussed following up with her labs in [facility].  - Comp Metabolic Panel; Future    3. Pure hypercholesterolemia  Chronic stable problem.  She will continue with her atorvastatin 20 mg daily.  Continue heart healthy diet and exercise.  Discussed adding lean protein to her diet as her weekly diet does not appear to account for much protein.  She verbalizes understanding.  Due for updated labs.  - Lipid Profile; Future    4. Chronic back pain greater than 3 months duration  Chronic stable problem.  Continue follow-up with [facility].  Continue use of hydrocodone.    5. Other glaucoma of both eyes  Chronic stable problem.  Continue follow-up with [facility].  Continue latanoprost.     6. Vitamin D deficiency  Chronic stable problem.  Continue daily vitamin D3 supplementation.  Due for updated labs.  - VITAMIN D,25 HYDROXY; Future    7. Former smoker  Chronic stable problem.  Continue smoking cessation.  Due for annual CT lung cancer screening.    8. History of tobacco abuse (Quit >6 mos ago)  Chronic stable problem.  Continue smoking cessation.  Due for annual CT for lung cancer screening.  - CT-LUNG CANCER-SCREENING; Future    9. Obesity (BMI 30-39.9)  Chronic stable problem.  Continue heart healthy diet and exercise.  - Patient identified as having weight management issue.  Appropriate orders and counseling given.    10. Encounter for screening mammogram for breast cancer  Chronic stable problem.  Due for annual mammogram.  - MA-SCREENING MAMMO BILAT W/TOMOSYNTHESIS W/CAD; Future      Return in about 1 year (around 6/20/2023), or if symptoms worsen or fail to improve.    Please note that this dictation was created using voice recognition software. I have made every reasonable attempt to correct obvious errors, but I expect  that there are errors of grammar and possibly content that I did not discover before finalizing the note.

## 2022-06-20 NOTE — ASSESSMENT & PLAN NOTE
She is followed by [facility]. She is currently taking hydrocodone.  Reports history of nerve ablations and steroid injections. Hx of scoliosis and arthritis.  Exercises 4 times a week, using treadmill and weights. Takes 1 hour in the morning to get moving.

## 2022-07-06 DIAGNOSIS — I10 ESSENTIAL HYPERTENSION: Chronic | ICD-10-CM

## 2022-07-06 RX ORDER — LISINOPRIL 10 MG/1
10 TABLET ORAL DAILY
Qty: 90 TABLET | Refills: 3 | Status: SHIPPED | OUTPATIENT
Start: 2022-07-06 | End: 2023-05-10

## 2022-07-06 NOTE — TELEPHONE ENCOUNTER
Requested Prescriptions     Signed Prescriptions Disp Refills   • lisinopril (PRINIVIL) 10 MG Tab 90 Tablet 3     Sig: Take 1 Tablet by mouth every day.     Authorizing Provider: KEILA BAUTISTA A.P.R.N.

## 2022-07-20 ENCOUNTER — HOSPITAL ENCOUNTER (OUTPATIENT)
Dept: LAB | Facility: MEDICAL CENTER | Age: 62
End: 2022-07-20
Attending: NURSE PRACTITIONER
Payer: COMMERCIAL

## 2022-07-20 DIAGNOSIS — E55.9 VITAMIN D DEFICIENCY: ICD-10-CM

## 2022-07-20 DIAGNOSIS — E78.00 PURE HYPERCHOLESTEROLEMIA: ICD-10-CM

## 2022-07-20 DIAGNOSIS — I10 PRIMARY HYPERTENSION: ICD-10-CM

## 2022-07-20 LAB
25(OH)D3 SERPL-MCNC: 56 NG/ML (ref 30–100)
ALBUMIN SERPL BCP-MCNC: 4.5 G/DL (ref 3.2–4.9)
ALBUMIN/GLOB SERPL: 1.8 G/DL
ALP SERPL-CCNC: 65 U/L (ref 30–99)
ALT SERPL-CCNC: 20 U/L (ref 2–50)
ANION GAP SERPL CALC-SCNC: 13 MMOL/L (ref 7–16)
AST SERPL-CCNC: 23 U/L (ref 12–45)
BILIRUB SERPL-MCNC: 0.2 MG/DL (ref 0.1–1.5)
BUN SERPL-MCNC: 26 MG/DL (ref 8–22)
CALCIUM SERPL-MCNC: 9.7 MG/DL (ref 8.5–10.5)
CHLORIDE SERPL-SCNC: 104 MMOL/L (ref 96–112)
CHOLEST SERPL-MCNC: 184 MG/DL (ref 100–199)
CO2 SERPL-SCNC: 22 MMOL/L (ref 20–33)
CREAT SERPL-MCNC: 1 MG/DL (ref 0.5–1.4)
GFR SERPLBLD CREATININE-BSD FMLA CKD-EPI: 64 ML/MIN/1.73 M 2
GLOBULIN SER CALC-MCNC: 2.5 G/DL (ref 1.9–3.5)
GLUCOSE SERPL-MCNC: 89 MG/DL (ref 65–99)
HDLC SERPL-MCNC: 58 MG/DL
LDLC SERPL CALC-MCNC: 94 MG/DL
POTASSIUM SERPL-SCNC: 4.3 MMOL/L (ref 3.6–5.5)
PROT SERPL-MCNC: 7 G/DL (ref 6–8.2)
SODIUM SERPL-SCNC: 139 MMOL/L (ref 135–145)
TRIGL SERPL-MCNC: 159 MG/DL (ref 0–149)

## 2022-07-20 PROCEDURE — 80053 COMPREHEN METABOLIC PANEL: CPT

## 2022-07-20 PROCEDURE — 82306 VITAMIN D 25 HYDROXY: CPT

## 2022-07-20 PROCEDURE — 80061 LIPID PANEL: CPT

## 2022-07-20 PROCEDURE — 36415 COLL VENOUS BLD VENIPUNCTURE: CPT

## 2022-08-12 DIAGNOSIS — E78.00 PURE HYPERCHOLESTEROLEMIA: ICD-10-CM

## 2022-08-15 ENCOUNTER — HOSPITAL ENCOUNTER (OUTPATIENT)
Dept: RADIOLOGY | Facility: MEDICAL CENTER | Age: 62
End: 2022-08-15
Attending: NURSE PRACTITIONER
Payer: COMMERCIAL

## 2022-08-15 DIAGNOSIS — Z87.891 HISTORY OF CIGARETTE SMOKING: ICD-10-CM

## 2022-08-15 PROCEDURE — 71271 CT THORAX LUNG CANCER SCR C-: CPT

## 2022-08-16 RX ORDER — ATORVASTATIN CALCIUM 20 MG/1
20 TABLET, FILM COATED ORAL
Qty: 90 TABLET | Refills: 3 | Status: SHIPPED | OUTPATIENT
Start: 2022-08-16 | End: 2023-07-10

## 2022-08-17 NOTE — TELEPHONE ENCOUNTER
Requested Prescriptions     Signed Prescriptions Disp Refills    atorvastatin (LIPITOR) 20 MG Tab 90 Tablet 3     Sig: Take 1 Tablet by mouth every day.     Authorizing Provider: KEILA BAUTISTA A.P.R.N.

## 2023-01-01 NOTE — LETTER
" [address]  [place] [address]  P [phone]  F [phone]         Date: May 24, 2021    Jayashree Anthony  [address] [place] [address]    Re:  Low-dose chest CT performed on 5/21/2021    Medical Record Number: [id number]    Dear Jayashree,    We are pleased to let you know that the results of your recent low-dose chest CT (LDCT) examination were negative, or showed no evidence of lung nodule or mass.  The radiologist recommends to continue annual screening with LDCT in 12 months.  In the event that any additional \"incidental\" findings were identified from this exam, we have communicated back to your primary care provider for follow-up.    Here are some other important points you should know:  · Your low-dose Chest CT report has been sent to your referring or primary health care provider and is available to participants in [facility].  As a part of our Lung Cancer Screening program we will remind you and your referring health care provider when your next LDCT screening is due.  · Although low-dose chest CT is very effective at finding lung cancer early, it cannot find all lung cancers. If you develop any new symptoms such as shortness of breath, chest pain, or coughing up blood, please call your doctor.  · Please keep in mind that good health involves quitting smoking (for help, call [facility] Quit Tobacco program at [phone]), an annual physical exam, and continued screening with low-dose chest CT.    Thank you for participating in the Lung Cancer Screening program. If you have any questions about this letter or our program, please call our Nurse at [phone].    Sincerely,  Yany Valentin MD, [facility]  Medical Director  [facility] Lung Cancer Screening Program    "
0

## 2023-04-26 ENCOUNTER — OFFICE VISIT (OUTPATIENT)
Dept: MEDICAL GROUP | Facility: PHYSICIAN GROUP | Age: 63
End: 2023-04-26
Payer: COMMERCIAL

## 2023-04-26 VITALS
WEIGHT: 186 LBS | OXYGEN SATURATION: 99 % | HEIGHT: 66 IN | TEMPERATURE: 97.3 F | BODY MASS INDEX: 29.89 KG/M2 | HEART RATE: 77 BPM | DIASTOLIC BLOOD PRESSURE: 70 MMHG | SYSTOLIC BLOOD PRESSURE: 116 MMHG

## 2023-04-26 DIAGNOSIS — L30.9 ECZEMA, UNSPECIFIED TYPE: ICD-10-CM

## 2023-04-26 DIAGNOSIS — L29.9 ITCHING: ICD-10-CM

## 2023-04-26 PROBLEM — L50.9 HIVES: Status: ACTIVE | Noted: 2023-04-26

## 2023-04-26 PROCEDURE — 99213 OFFICE O/P EST LOW 20 MIN: CPT | Performed by: NURSE PRACTITIONER

## 2023-04-26 RX ORDER — METHYLPREDNISOLONE 4 MG/1
TABLET ORAL
Qty: 21 TABLET | Refills: 0 | Status: SHIPPED | OUTPATIENT
Start: 2023-04-26 | End: 2023-06-20

## 2023-04-26 RX ORDER — CETIRIZINE HYDROCHLORIDE 10 MG/1
10 TABLET ORAL DAILY
COMMUNITY
End: 2023-06-20

## 2023-04-26 ASSESSMENT — PATIENT HEALTH QUESTIONNAIRE - PHQ9: CLINICAL INTERPRETATION OF PHQ2 SCORE: 0

## 2023-04-26 NOTE — PROGRESS NOTES
Subjective:     CC: urticaria    HPI:   Jayashree presents today with the following:    Hives  She first noticed the hives 1-2 weeks ago.  The hives are located on her back, arms and chest.  The hives are itchy and dry. The hives on her arms are improving. Her right and left posterior leg has hives.   As a child would get dry patches due to the swimming pool. 2 months ago did tanning bed and did have hives but not as severe. As she was outside the hives worsened. She does not wear sunscreen. She has tried zyrtec for 3-4 days that helped with itching and cortisone 10 cream that does help but does not resolve. Denies fever, chills, discharge, vesicles, new medications, new soaps, new detergents, new foods.      Past Medical History:   Diagnosis Date    Arthritis     Chronic back pain greater than 3 months duration 3/23/2015    Former smoker     Glaucoma     sees [facility].    Healthcare maintenance 3/17/2021    Hypertension     Hypertension 4/15/2013    Scoliosis     Varicose vein        Social History     Tobacco Use    Smoking status: Some Days     Packs/day: 1.00     Years: 34.00     Pack years: 34.00     Types: Cigarettes, Cigars     Last attempt to quit: 2015     Years since quittin.7    Smokeless tobacco: Never    Tobacco comments:     Quit in    (Hx 1 ppd x 34 yrs). But has 1 cigar a week   Vaping Use    Vaping Use: Former   Substance Use Topics    Alcohol use: Yes     Comment: 4 per week; wine and vodka/seltzer    Drug use: No       Current Outpatient Medications Ordered in Epic   Medication Sig Dispense Refill    cetirizine (ZYRTEC) 10 MG Tab Take 10 mg by mouth every day.      methylPREDNISolone (MEDROL DOSEPAK) 4 MG Tablet Therapy Pack As directed on the packaging label. 21 Tablet 0    atorvastatin (LIPITOR) 20 MG Tab Take 1 Tablet by mouth every day. 90 Tablet 3    lisinopril (PRINIVIL) 10 MG Tab Take 1 Tablet by mouth every day. 90 Tablet 3    COENZYME Q10 PO Take  by mouth every day.      Omega-3  "Fatty Acids (FISH OIL) 1000 MG Cap capsule Take 1,000 mg by mouth 3 times a day with meals.      latanoprost (XALATAN) 0.005 % Solution INSTILL 1 DROP INTO BOTH EYES EVERY NIGHT      Cholecalciferol (VITAMIN D3 PO) Take  by mouth.      HYDROcodone-acetaminophen (NORCO) 7.5-325 MG per tablet Take 1 Tab by mouth every 8 hours as needed.  0    Misc. Devices Misc Hot tub For alleviating pain and Arthritis discpmfprt 1 Each 0    acetaminophen (TYLENOL) 500 MG Tab Take 500-1,000 mg by mouth every 6 hours as needed.      Multiple Vitamins-Minerals (ONE-A-DAY 50 PLUS PO) Take  by mouth every day.       No current [facility]-ordered facility-administered medications on file.       Allergies:  Patient has no known allergies.    Health Maintenance: Reviewed. Encouraged to schedule with gynecology for pap smear, referral in place and approved.       Objective:     Vital signs reviewed  Exam:  /70 (BP Location: Left arm, Patient Position: Sitting, BP Cuff Size: Adult)   Pulse 77   Temp 36.3 °C (97.3 °F) (Temporal)   Ht 1.664 m (5' 5.5\")   Wt 84.4 kg (186 lb)   LMP 12/28/2011   SpO2 99%   BMI 30.48 kg/m²  Body mass index is 30.48 kg/m².    Gen: Alert and oriented, No apparent distress.  Neck: Neck is supple, full ROM.  Lungs: Normal effort, no audible wheezes  CV: Skin pink, warm and dry.  Ext: No clubbing, cyanosis, edema. Dry, raised, rough erythematous, hyperpigmented scattered lesions in various sizes to upper back, right and left gluteal fold, right upper chest and bilateral arms. No drainage, streaking or vesicles.        Assessment & Plan:     63 y.o. female with the following -     1. Eczema, unspecified type  Acute uncomplicated problem.  Exam appears consistent with eczema.  Discussed using daily moisturizer that is fragrance free and scent free such as either Lubriderm, Cetaphil or CeraVe and encouraged to apply to her back, arms and legs.  Continue to watch for triggering factors.  We will start a Medrol " Dosepak to help with her itching.  See #2 below.  - methylPREDNISolone (MEDROL DOSEPAK) 4 MG Tablet Therapy Pack; As directed on the packaging label.  Dispense: 21 Tablet; Refill: 0    2. Itching  Acute uncomplicated problem.  She is having itching that has not improved with Zyrtec or topical cortisone cream.  Started on Medrol Dosepak.  Encouraged moisturization.  See #1 above.  - methylPREDNISolone (MEDROL DOSEPAK) 4 MG Tablet Therapy Pack; As directed on the packaging label.  Dispense: 21 Tablet; Refill: 0      Return if symptoms worsen or fail to improve.    Please note that this dictation was created using voice recognition software. I have made every reasonable attempt to correct obvious errors, but I expect that there are errors of grammar and possibly content that I did not discover before finalizing the note.

## 2023-04-26 NOTE — ASSESSMENT & PLAN NOTE
She first noticed the hives 1-2 weeks ago.  The hives are located on her back, arms and chest.  The hives are itchy and dry. The hives on her arms are improving. Her right and left posterior leg has hives.   As a child would get dry patches due to the swimming pool. 2 months ago did tanning bed and did have hives but not as severe. As she was outside the hives worsened. She does not wear sunscreen. She has tried zyrtec for 3-4 days that helped with itching and cortisone 10 cream that does help but does not resolve. Denies fever, chills, discharge, vesicles, new medications, new soaps, new detergents, new foods.

## 2023-05-10 DIAGNOSIS — I10 ESSENTIAL HYPERTENSION: Chronic | ICD-10-CM

## 2023-05-10 RX ORDER — LISINOPRIL 10 MG/1
10 TABLET ORAL DAILY
Qty: 90 TABLET | Refills: 3 | Status: SHIPPED | OUTPATIENT
Start: 2023-05-10

## 2023-05-11 NOTE — TELEPHONE ENCOUNTER
Requested Prescriptions     Signed Prescriptions Disp Refills    lisinopril (PRINIVIL) 10 MG Tab 90 Tablet 3     Sig: TAKE 1 TABLET BY MOUTH EVERY DAY     Authorizing Provider: KEILA BAUTISTA A.P.R.N.

## 2023-05-19 ENCOUNTER — PATIENT MESSAGE (OUTPATIENT)
Dept: MEDICAL GROUP | Facility: PHYSICIAN GROUP | Age: 63
End: 2023-05-19
Payer: COMMERCIAL

## 2023-05-19 DIAGNOSIS — L29.9 ITCHING: ICD-10-CM

## 2023-05-19 DIAGNOSIS — L30.9 ECZEMA, UNSPECIFIED TYPE: ICD-10-CM

## 2023-06-20 ENCOUNTER — OFFICE VISIT (OUTPATIENT)
Dept: MEDICAL GROUP | Facility: PHYSICIAN GROUP | Age: 63
End: 2023-06-20
Payer: COMMERCIAL

## 2023-06-20 VITALS
BODY MASS INDEX: 30.37 KG/M2 | SYSTOLIC BLOOD PRESSURE: 122 MMHG | WEIGHT: 189 LBS | TEMPERATURE: 96.8 F | OXYGEN SATURATION: 99 % | HEIGHT: 66 IN | HEART RATE: 75 BPM | DIASTOLIC BLOOD PRESSURE: 78 MMHG

## 2023-06-20 DIAGNOSIS — R53.83 OTHER FATIGUE: ICD-10-CM

## 2023-06-20 DIAGNOSIS — R63.5 WEIGHT GAIN: ICD-10-CM

## 2023-06-20 DIAGNOSIS — E78.00 PURE HYPERCHOLESTEROLEMIA: ICD-10-CM

## 2023-06-20 DIAGNOSIS — M54.9 CHRONIC BACK PAIN GREATER THAN 3 MONTHS DURATION: ICD-10-CM

## 2023-06-20 DIAGNOSIS — Z12.31 ENCOUNTER FOR SCREENING MAMMOGRAM FOR BREAST CANCER: ICD-10-CM

## 2023-06-20 DIAGNOSIS — E66.9 OBESITY (BMI 30-39.9): ICD-10-CM

## 2023-06-20 DIAGNOSIS — Z87.891 HISTORY OF CIGARETTE SMOKING: ICD-10-CM

## 2023-06-20 DIAGNOSIS — Z00.00 ANNUAL VISIT FOR GENERAL ADULT MEDICAL EXAMINATION WITHOUT ABNORMAL FINDINGS: ICD-10-CM

## 2023-06-20 DIAGNOSIS — G89.29 CHRONIC BACK PAIN GREATER THAN 3 MONTHS DURATION: ICD-10-CM

## 2023-06-20 DIAGNOSIS — Z76.89 ENCOUNTER TO ESTABLISH CARE: ICD-10-CM

## 2023-06-20 DIAGNOSIS — R73.02 IMPAIRED GLUCOSE TOLERANCE: ICD-10-CM

## 2023-06-20 DIAGNOSIS — Z12.4 PAP SMEAR FOR CERVICAL CANCER SCREENING: ICD-10-CM

## 2023-06-20 DIAGNOSIS — H40.89 OTHER GLAUCOMA OF BOTH EYES: ICD-10-CM

## 2023-06-20 DIAGNOSIS — I10 PRIMARY HYPERTENSION: ICD-10-CM

## 2023-06-20 DIAGNOSIS — E55.9 VITAMIN D DEFICIENCY: ICD-10-CM

## 2023-06-20 PROBLEM — M47.816 ARTHROPATHY OF LUMBAR FACET JOINT: Status: ACTIVE | Noted: 2023-01-04

## 2023-06-20 PROBLEM — M53.3 SACROILIAC JOINT PAIN: Status: ACTIVE | Noted: 2023-02-27

## 2023-06-20 PROCEDURE — 3074F SYST BP LT 130 MM HG: CPT | Performed by: NURSE PRACTITIONER

## 2023-06-20 PROCEDURE — 99396 PREV VISIT EST AGE 40-64: CPT | Performed by: NURSE PRACTITIONER

## 2023-06-20 PROCEDURE — 3078F DIAST BP <80 MM HG: CPT | Performed by: NURSE PRACTITIONER

## 2023-06-20 RX ORDER — BACILLUS COAGULANS/INULIN 500MM-1.5G
TABLET,CHEWABLE ORAL DAILY
COMMUNITY

## 2023-06-20 NOTE — ASSESSMENT & PLAN NOTE
Followed up by [facility] and [facility] every 6 months.

## 2023-06-20 NOTE — PROGRESS NOTES
Subjective:     CC:   Chief Complaint   Patient presents with    Annual Exam       HPI:   Jayashree Anthony is a 63 y.o. female who presents for annual exam. She is feeling well and denies any complaints.    Chronic back pain greater than 3 months duration  Followed by [facility] and had ablation 2 weeks ago. Has arthritis in spine. Taking norco as needed in the morning, pain worse in the morning. Some days will take 2-4 tablets and some days 1 tablet/day.     Hypertension  Blood pressure controlled with lisinopril 10 mg daily.    Hyperlipidemia  Continues atorvastatin 20 mg daily, due for updated labs.    Vitamin D deficiency  Taking daily multivitamin and vitamin D3.     Other specified glaucoma  Followed up by [facility] and [facility] every 6 months.    Obesity (BMI 30-39.9)  Her BMI today is 30.97 kg/m2. She has noticed weight gain, ordering labs.       Ob-Gyn/ History:    Patient has GYN provider: no  /Para:  5/4  Last Pap Smear:  2013. No history of abnormal pap smears.  Gyn Surgery:  none.  Current Contraceptive Method:  postmenopausal. She is currently sexually active.  No significant bloating/fluid retention, pelvic pain, or dyspareunia. No vaginal discharge.  Post-menopausal bleeding: none  Urinary incontinence: none    Health Maintenance  Osteoporosis Screen/ DEXA: 2018 normal.    PT/vit D for falls prevention: yes   Cholesterol Screening: due for updated labs   Diabetes Screening: due for updated labs   Diet: She is watching her sugars but is having weight gain and is in bed by 7:30.    Exercise: She is limited due to her back pain but is currently stretching exercises in the hot tub, weights for 10 minutes and treadmill for 10 minutes during the week, 4-5 times. Gets 8,000-9,000 steps/day.  Substance Abuse: Discussed and reviewed    Safe in relationship.   Seat belts safety discussed.  Sun protection used.    Cancer screening  Colorectal Cancer Screening:  Completed 5/27/2021    Lung Cancer Screening: Due after 8/15/2023    Cervical Cancer Screening: Due, referral to Gynecology   Breast Cancer Screening: Ordered today     Infectious disease screening/Immunizations  --STI Screening: declines   --Practices safe sex.  --Hepatitis C Screening: Completed 9/3/2019   --Immunizations:    Influenza: n/a    Tetanus: Completed 8/15/2019    Shingles: Due for 1 shot   Pneumococcal : declines     Other immunizations: Declines COVID vaccine     She  has a past medical history of Arthritis, Chronic back pain greater than 3 months duration (3/23/2015), Former smoker, Glaucoma, Healthcare maintenance (3/17/2021), Hypertension, Hypertension (4/15/2013), Scoliosis, and Varicose vein.  She  has a past surgical history that includes cervical fusion posterior (12/27/2011); cervical laminectomy posterior (12/27/2011); and other orthopedic surgery (1968).    Family History   Problem Relation Age of Onset    Hyperlipidemia Mother     Heart Disease Mother     Diabetes Mother     Thyroid Mother     Hyperlipidemia Father     Cancer Father         leukemia due to chemical exposure    Thyroid Sister         benign thyroid tumor    Cancer Maternal Grandmother         skin    Pulmonary Embolism Maternal Grandmother     Arterial Aneurysm Maternal Grandfather     Heart Disease Paternal Grandmother         d/t meningitis complications    Cancer Paternal Grandfather         colon    No Known Problems Daughter     No Known Problems Daughter     Heart defect Daughter     Heart defect Daughter     Hyperlipidemia Brother        Social History     Socioeconomic History    Marital status:      Spouse name: Not on file    Number of children: Not on file    Years of education: Not on file    Highest education level: Associate degree: academic program   Occupational History    Not on file   Tobacco Use    Smoking status: Some Days     Packs/day: 1.00     Years: 34.00     Pack years: 34.00     Types:  Cigarettes, Cigars     Last attempt to quit: 2015     Years since quittin.9    Smokeless tobacco: Never    Tobacco comments:     Quit in 2015   (Hx 1 ppd x 34 yrs). But has 1 cigar a week   Vaping Use    Vaping Use: Former    Quit date: 2015   Substance and Sexual Activity    Alcohol use: Yes     Comment: 4 per week; wine and vodka/seltzer    Drug use: No    Sexual activity: Yes     Partners: Male     Comment:    Other Topics Concern     Service No    Blood Transfusions No    Caffeine Concern No    Occupational Exposure No    Hobby Hazards Yes     Comment: motorcycle riding     Sleep Concern Yes     Comment: pain    Stress Concern Yes    Weight Concern Yes    Special Diet No    Back Care Yes    Exercise Yes     Comment: tries     Bike Helmet No    Seat Belt Yes    Self-Exams Yes   Social History Narrative    Not on file     Social Determinants of Health     Financial Resource Strain: Low Risk  (10/19/2020)    Overall Financial Resource Strain (CARDIA)     Difficulty of Paying Living Expenses: Not very hard   Food Insecurity: Food Insecurity Present (10/19/2020)    Hunger Vital Sign     Worried About Running Out of Food in the Last Year: Sometimes true     Ran Out of Food in the Last Year: Never true   Transportation Needs: No Transportation Needs (10/19/2020)    PRAPARE - Transportation     Lack of Transportation (Medical): No     Lack of Transportation (Non-Medical): No   Physical Activity: Insufficiently Active (10/19/2020)    Exercise Vital Sign     Days of Exercise per Week: 5 days     Minutes of Exercise per Session: 20 min   Stress: No Stress Concern Present (10/19/2020)    [employer] [place] [employer] - Occupational Stress Questionnaire     Feeling of Stress : Not at all   Social Connections: Unknown (10/19/2020)    Social Connection and Isolation Panel [NHANES]     Frequency of Communication with Friends and Family: More than three times a week     Frequency of Social  Gatherings with Friends and Family: Once a week     Attends [employer] Services: Patient refused     Active Member of Clubs or Organizations: No     Attends Club or Organization Meetings: Patient refused     Marital Status:    Intimate Partner Violence: Not on file   Housing Stability: Low Risk  (10/19/2020)    Housing Stability Vital Sign     Unable to Pay for Housing in the Last Year: No     Number of Places Lived in the Last Year: 1     Unstable Housing in the Last Year: No       Patient Active Problem List    Diagnosis Date Noted    Hives 04/26/2023    Sacroiliac joint pain 02/27/2023    Arthropathy of lumbar facet joint 01/04/2023    Obesity (BMI 30-39.9) 06/20/2022    Former smoker 04/13/2021    Other specified glaucoma 03/17/2021    Impaired glucose tolerance 10/20/2020    Vitamin D deficiency 10/20/2020    Lipoma 10/20/2020    Hyperlipidemia 09/19/2019    Chronic back pain greater than 3 months duration 03/23/2015    Hypertension 04/15/2013         Current Outpatient Medications   Medication Sig Dispense Refill    Bacillus Coagulans-Inulin (BENEFIBER PREBIOTIC+PROBIOTIC) Chew Tab Chew every day.      lisinopril (PRINIVIL) 10 MG Tab TAKE 1 TABLET BY MOUTH EVERY DAY 90 Tablet 3    atorvastatin (LIPITOR) 20 MG Tab Take 1 Tablet by mouth every day. 90 Tablet 3    COENZYME Q10 PO Take  by mouth every day.      Omega-3 Fatty Acids (FISH OIL) 1000 MG Cap capsule Take 1,000 mg by mouth 3 times a day with meals.      latanoprost (XALATAN) 0.005 % Solution INSTILL 1 DROP INTO BOTH EYES EVERY NIGHT      Cholecalciferol (VITAMIN D3 PO) Take  by mouth.      HYDROcodone-acetaminophen (NORCO) 7.5-325 MG per tablet Take 1 Tab by mouth every 8 hours as needed.  0    Misc. Devices Misc Hot tub For alleviating pain and Arthritis discpmfprt 1 Each 0    acetaminophen (TYLENOL) 500 MG Tab Take 500-1,000 mg by mouth every 6 hours as needed.      Multiple Vitamins-Minerals (ONE-A-DAY 50 PLUS PO) Take  by mouth every day.    "    No current facility-administered medications for this visit.     No Known Allergies    Review of Systems   Constitutional: Negative for fever, chills. Positive for fatigue and weight gain.  HENT: Negative for congestion.    Eyes: Negative for pain.   Respiratory: Negative for cough and shortness of breath.    Cardiovascular: Negative for leg swelling.   Gastrointestinal: Negative for nausea, vomiting, abdominal pain and diarrhea.   Genitourinary: Negative for dysuria and hematuria.   Skin: Negative for rash.   Neurological: Negative for dizziness, focal weakness and headaches.   Endo/Heme/Allergies: Does not bruise/bleed easily.   Psychiatric/Behavioral: Negative for depression.  The patient is not nervous/anxious.      Objective:     Vital signs reviewed  /78 (BP Location: Right arm, Patient Position: Sitting, BP Cuff Size: Adult)   Pulse 75   Temp 36 °C (96.8 °F) (Temporal)   Ht 1.664 m (5' 5.5\")   Wt 85.7 kg (189 lb)   LMP 12/28/2011   SpO2 99%   BMI 30.97 kg/m²   Body mass index is 30.97 kg/m².  Wt Readings from Last 4 Encounters:   06/20/23 85.7 kg (189 lb)   04/26/23 84.4 kg (186 lb)   06/20/22 83 kg (183 lb)   05/12/21 76 kg (167 lb 8.8 oz)       Physical Exam:  Constitutional: Well-developed and well-nourished. Not diaphoretic. No distress.   Skin: Skin is warm and dry. No rash noted.  Head: Atraumatic without lesions.  Eyes: Conjunctivae and extraocular motions are normal. Pupils are equal, round, and reactive to light. No scleral icterus.   Ears:  External ears unremarkable. Tympanic membranes clear and intact.  Nose: Nares patent. Septum midline. Turbinates without erythema nor edema. No discharge.   Mouth/Throat: Dentition is intact. Tongue normal. Oropharynx is clear and moist. Posterior pharynx without erythema or exudates.  Neck: Supple, trachea midline. Normal range of motion. No thyromegaly present. No lymphadenopathy--cervical or supraclavicular.  Cardiovascular: Regular rate and " rhythm, S1 and S2 without murmur, rubs, or gallops.  Lungs: Normal inspiratory effort, CTA bilaterally, no wheezes/rhonchi/rales  Abdomen: Soft, non tender, and without distention. Active bowel sounds in all four quadrants. No rebound, guarding, masses or HSM.  Extremities: No cyanosis, clubbing, erythema, nor edema. Distal pulses intact and symmetric.   Musculoskeletal: All major joints AROM full in all directions without pain.  Neurological: Alert and oriented x 3. DTRs 2+/3 and symmetric. No cranial nerve deficit. 5/5 myotomes. Sensation intact. Negative Rhomberg.  Psychiatric:  Behavior, mood, and affect are appropriate.      Assessment and Plan:     1. Annual visit for general adult medical examination without abnormal findings  Acute uncomplicated problem.  Annual exam completed today.    2. Encounter to establish care  3. Pap smear for cervical cancer screening  Acute uncomplicated problem.  She is due for Pap smear.  She states that she had a difficult Pap last time and would like to see gynecology.  Referral placed to gynecology.  - Referral to Gynecology    4. Chronic back pain greater than 3 months duration  Chronic stable problem.  Continue follow-up with [facility].  Continue with Norco per spine [facility].    5. History of cigarette smoking  Chronic stable problem.  She will be due for annual screening around August 2023.  - CT-LUNG CANCER-SCREENING; Future    6. Encounter for screening mammogram for breast cancer  Chronic stable problem.  Due for screening.  - MA-SCREENING MAMMO BILAT W/TOMOSYNTHESIS W/CAD; Future    7. Obesity (BMI 30-39.9)  Chronic stable problem  Discussed referral to dietitian as he is in agreement.  Checking updated labs.  - Patient identified as having weight management issue.  Appropriate orders and counseling given.  - Referral to Nutrition Services    8. Vitamin D deficiency  Chronic stable problem.  Due for annual labs.  - VITAMIN D,25 HYDROXY (DEFICIENCY); Future    9. Other  fatigue  Acute uncomplicated problem.  She has been having fatigue and weight gain.  We will check labs as below.  Differential diagnosis includes anemia, thyroid dysfunction, vitamin deficiency, electrolyte abnormality, sleep apnea.  - CBC WITH DIFFERENTIAL; Future  - TSH WITH REFLEX TO FT4; Future  - VITAMIN B12; Future  - VITAMIN D,25 HYDROXY (DEFICIENCY); Future    10. Pure hypercholesterolemia  Chronic stable problem.  Continue atorvastatin 20 mg daily.  Continue with a diet and exercise as tolerated.  Due for updated labs.  - Lipid Profile; Future    11. Impaired glucose tolerance  Chronic stable problem.  Due for updated labs.  - HEMOGLOBIN A1C; Future    12. Primary hypertension  Chronic stable problem.  Blood pressure is at goal.  Continue lisinopril 10 mg daily.  Due for updated labs.  - Comp Metabolic Panel; Future    13. Weight gain  Acute uncomplicated problem.  She is having weight gain.  We will check labs and she will return in 1 week for follow-up.  - Comp Metabolic Panel; Future  - TSH WITH REFLEX TO FT4; Future  - HEMOGLOBIN A1C; Future    14. Other glaucoma of both eyes  Chronic stable problem.  Continue follow-up with [facility] every 6 months.      HCM:  reviewed    Labs per orders  Immunizations per orders  Patient counseled about skin care, diet, supplements, prenatal vitamins, safe sex and exercise.    Follow-up: Return for Labs, weight gain.    Please note that this dictation was created using voice recognition software. I have made every reasonable attempt to correct obvious errors, but I expect that there are errors of grammar and possibly content that I did not discover before finalizing the note.

## 2023-06-20 NOTE — ASSESSMENT & PLAN NOTE
Followed by [facility] and had ablation 2 weeks ago. Has arthritis in spine. Taking norco as needed in the morning, pain worse in the morning. Some days will take 2-4 tablets and some days 1 tablet/day.

## 2023-06-27 ENCOUNTER — HOSPITAL ENCOUNTER (OUTPATIENT)
Dept: LAB | Facility: MEDICAL CENTER | Age: 63
End: 2023-06-27
Attending: NURSE PRACTITIONER
Payer: COMMERCIAL

## 2023-06-27 DIAGNOSIS — E55.9 VITAMIN D DEFICIENCY: ICD-10-CM

## 2023-06-27 DIAGNOSIS — I10 PRIMARY HYPERTENSION: ICD-10-CM

## 2023-06-27 DIAGNOSIS — R63.5 WEIGHT GAIN: ICD-10-CM

## 2023-06-27 DIAGNOSIS — R73.02 IMPAIRED GLUCOSE TOLERANCE: ICD-10-CM

## 2023-06-27 DIAGNOSIS — R53.83 OTHER FATIGUE: ICD-10-CM

## 2023-06-27 DIAGNOSIS — E78.00 PURE HYPERCHOLESTEROLEMIA: ICD-10-CM

## 2023-06-27 LAB
25(OH)D3 SERPL-MCNC: 47 NG/ML (ref 30–100)
ALBUMIN SERPL BCP-MCNC: 4.6 G/DL (ref 3.2–4.9)
ALBUMIN/GLOB SERPL: 1.8 G/DL
ALP SERPL-CCNC: 63 U/L (ref 30–99)
ALT SERPL-CCNC: 30 U/L (ref 2–50)
ANION GAP SERPL CALC-SCNC: 12 MMOL/L (ref 7–16)
AST SERPL-CCNC: 30 U/L (ref 12–45)
BASOPHILS # BLD AUTO: 1 % (ref 0–1.8)
BASOPHILS # BLD: 0.04 K/UL (ref 0–0.12)
BILIRUB SERPL-MCNC: 0.4 MG/DL (ref 0.1–1.5)
BUN SERPL-MCNC: 18 MG/DL (ref 8–22)
CALCIUM ALBUM COR SERPL-MCNC: 9.3 MG/DL (ref 8.5–10.5)
CALCIUM SERPL-MCNC: 9.8 MG/DL (ref 8.5–10.5)
CHLORIDE SERPL-SCNC: 102 MMOL/L (ref 96–112)
CHOLEST SERPL-MCNC: 185 MG/DL (ref 100–199)
CO2 SERPL-SCNC: 24 MMOL/L (ref 20–33)
CREAT SERPL-MCNC: 0.96 MG/DL (ref 0.5–1.4)
EOSINOPHIL # BLD AUTO: 0.18 K/UL (ref 0–0.51)
EOSINOPHIL NFR BLD: 4.4 % (ref 0–6.9)
ERYTHROCYTE [DISTWIDTH] IN BLOOD BY AUTOMATED COUNT: 45 FL (ref 35.9–50)
EST. AVERAGE GLUCOSE BLD GHB EST-MCNC: 108 MG/DL
FASTING STATUS PATIENT QL REPORTED: NORMAL
GFR SERPLBLD CREATININE-BSD FMLA CKD-EPI: 66 ML/MIN/1.73 M 2
GLOBULIN SER CALC-MCNC: 2.5 G/DL (ref 1.9–3.5)
GLUCOSE SERPL-MCNC: 90 MG/DL (ref 65–99)
HBA1C MFR BLD: 5.4 % (ref 4–5.6)
HCT VFR BLD AUTO: 45.5 % (ref 37–47)
HDLC SERPL-MCNC: 51 MG/DL
HGB BLD-MCNC: 14.8 G/DL (ref 12–16)
IMM GRANULOCYTES # BLD AUTO: 0.02 K/UL (ref 0–0.11)
IMM GRANULOCYTES NFR BLD AUTO: 0.5 % (ref 0–0.9)
LDLC SERPL CALC-MCNC: 94 MG/DL
LYMPHOCYTES # BLD AUTO: 1.14 K/UL (ref 1–4.8)
LYMPHOCYTES NFR BLD: 27.9 % (ref 22–41)
MCH RBC QN AUTO: 30.1 PG (ref 27–33)
MCHC RBC AUTO-ENTMCNC: 32.5 G/DL (ref 32.2–35.5)
MCV RBC AUTO: 92.7 FL (ref 81.4–97.8)
MONOCYTES # BLD AUTO: 0.47 K/UL (ref 0–0.85)
MONOCYTES NFR BLD AUTO: 11.5 % (ref 0–13.4)
NEUTROPHILS # BLD AUTO: 2.23 K/UL (ref 1.82–7.42)
NEUTROPHILS NFR BLD: 54.7 % (ref 44–72)
NRBC # BLD AUTO: 0 K/UL
NRBC BLD-RTO: 0 /100 WBC (ref 0–0.2)
PLATELET # BLD AUTO: 260 K/UL (ref 164–446)
PMV BLD AUTO: 10.6 FL (ref 9–12.9)
POTASSIUM SERPL-SCNC: 4.2 MMOL/L (ref 3.6–5.5)
PROT SERPL-MCNC: 7.1 G/DL (ref 6–8.2)
RBC # BLD AUTO: 4.91 M/UL (ref 4.2–5.4)
SODIUM SERPL-SCNC: 138 MMOL/L (ref 135–145)
TRIGL SERPL-MCNC: 199 MG/DL (ref 0–149)
TSH SERPL DL<=0.005 MIU/L-ACNC: 2.77 UIU/ML (ref 0.38–5.33)
VIT B12 SERPL-MCNC: 850 PG/ML (ref 211–911)
WBC # BLD AUTO: 4.1 K/UL (ref 4.8–10.8)

## 2023-06-27 PROCEDURE — 82607 VITAMIN B-12: CPT

## 2023-06-27 PROCEDURE — 36415 COLL VENOUS BLD VENIPUNCTURE: CPT

## 2023-06-27 PROCEDURE — 84443 ASSAY THYROID STIM HORMONE: CPT

## 2023-06-27 PROCEDURE — 80053 COMPREHEN METABOLIC PANEL: CPT

## 2023-06-27 PROCEDURE — 85025 COMPLETE CBC W/AUTO DIFF WBC: CPT

## 2023-06-27 PROCEDURE — 80061 LIPID PANEL: CPT

## 2023-06-27 PROCEDURE — 83036 HEMOGLOBIN GLYCOSYLATED A1C: CPT

## 2023-06-27 PROCEDURE — 82306 VITAMIN D 25 HYDROXY: CPT

## 2023-06-29 ENCOUNTER — OFFICE VISIT (OUTPATIENT)
Dept: MEDICAL GROUP | Facility: PHYSICIAN GROUP | Age: 63
End: 2023-06-29
Payer: COMMERCIAL

## 2023-06-29 VITALS
TEMPERATURE: 96.8 F | WEIGHT: 188 LBS | SYSTOLIC BLOOD PRESSURE: 110 MMHG | OXYGEN SATURATION: 95 % | HEIGHT: 66 IN | BODY MASS INDEX: 30.22 KG/M2 | DIASTOLIC BLOOD PRESSURE: 66 MMHG | HEART RATE: 71 BPM

## 2023-06-29 DIAGNOSIS — R63.5 WEIGHT GAIN: ICD-10-CM

## 2023-06-29 DIAGNOSIS — R73.02 IMPAIRED GLUCOSE TOLERANCE: ICD-10-CM

## 2023-06-29 DIAGNOSIS — E78.00 PURE HYPERCHOLESTEROLEMIA: ICD-10-CM

## 2023-06-29 DIAGNOSIS — I10 PRIMARY HYPERTENSION: ICD-10-CM

## 2023-06-29 DIAGNOSIS — E55.9 VITAMIN D DEFICIENCY: ICD-10-CM

## 2023-06-29 PROCEDURE — 3074F SYST BP LT 130 MM HG: CPT | Performed by: NURSE PRACTITIONER

## 2023-06-29 PROCEDURE — 99214 OFFICE O/P EST MOD 30 MIN: CPT | Performed by: NURSE PRACTITIONER

## 2023-06-29 PROCEDURE — 3078F DIAST BP <80 MM HG: CPT | Performed by: NURSE PRACTITIONER

## 2023-06-29 ASSESSMENT — FIBROSIS 4 INDEX: FIB4 SCORE: 1.33

## 2023-06-29 NOTE — ASSESSMENT & PLAN NOTE
Cholesterol controlled with atorvastatin 20 mg daily. Triglycerides elevated, encouraged to decrease alcohol intake, watch red meats, fried and fatty foods.

## 2023-06-29 NOTE — PATIENT INSTRUCTIONS
[facility] Clinical Nutrition and Metabolism  [address] [place]  [address]  Phone: [phone]

## 2023-06-29 NOTE — ASSESSMENT & PLAN NOTE
Patient reports weight gain for the 3 last years. She has gained 15-20 pounds. Her initial weight on 6/20/2022 at establishing appointment was 183 pounds. She has been keeping a food diary for the last week. Her daily calories range 900-1400. She is doing stretching for 5 minutes, weights for 10 minutes, treadmill 5-10 minutes at fast pace, averaging 20-25 minutes 4 times a week. At work she will walk 5,000-6,000 steps. She a referral to the dietician, encouraged to schedule.

## 2023-06-29 NOTE — PROGRESS NOTES
Subjective:     CC: lab results and weight gain    HPI:   Jayashree presents today with the following:    Weight gain  Patient reports weight gain for the 3 last years. She has gained 15-20 pounds. Her initial weight on 2022 at establishing appointment was 183 pounds. She has been keeping a food diary for the last week. Her daily calories range 900-1400. She is doing stretching for 5 minutes, weights for 10 minutes, treadmill 5-10 minutes at fast pace, averaging 20-25 minutes 4 times a week. At work she will walk 5,000-6,000 steps. She a referral to the dietician, encouraged to schedule.     Hypertension  Blood pressure 110/66. Continues lisinopril 10 mg daily. Recent CMP WNL.     Hyperlipidemia  Cholesterol controlled with atorvastatin 20 mg daily. Triglycerides elevated, encouraged to decrease alcohol intake, watch red meats, fried and fatty foods.     Impaired glucose tolerance  Recent labs show fasting glucose of 90 and A1C of 5.4%.    Vitamin D deficiency  Continues daily vitamin D3, recent labs show level of 47.    Past Medical History:   Diagnosis Date    Arthritis     Chronic back pain greater than 3 months duration 3/23/2015    Former smoker     Glaucoma     sees [facility].    Healthcare maintenance 3/17/2021    Hypertension     Hypertension 4/15/2013    Scoliosis     Varicose vein        Social History     Tobacco Use    Smoking status: Some Days     Packs/day: 1.00     Years: 34.00     Pack years: 34.00     Types: Cigarettes, Cigars     Last attempt to quit: 2015     Years since quittin.9    Smokeless tobacco: Never    Tobacco comments:     Quit in    (Hx 1 ppd x 34 yrs). But has 1-2 cigar a week   Vaping Use    Vaping Use: Former    Quit date: 2015   Substance Use Topics    Alcohol use: Yes     Comment: 4 per week; wine and vodka/seltzer    Drug use: No       Current Outpatient Medications Ordered in Epic   Medication Sig Dispense Refill    Bacillus Coagulans-Inulin (BENEFIBER  "PREBIOTIC+PROBIOTIC) Chew Tab Chew every day.      lisinopril (PRINIVIL) 10 MG Tab TAKE 1 TABLET BY MOUTH EVERY DAY 90 Tablet 3    atorvastatin (LIPITOR) 20 MG Tab Take 1 Tablet by mouth every day. 90 Tablet 3    COENZYME Q10 PO Take  by mouth every day.      Omega-3 Fatty Acids (FISH OIL) 1000 MG Cap capsule Take 1,000 mg by mouth 3 times a day with meals.      latanoprost (XALATAN) 0.005 % Solution INSTILL 1 DROP INTO BOTH EYES EVERY NIGHT      Cholecalciferol (VITAMIN D3 PO) Take  by mouth.      HYDROcodone-acetaminophen (NORCO) 7.5-325 MG per tablet Take 1 Tab by mouth every 8 hours as needed.  0    Misc. Devices Misc Hot tub For alleviating pain and Arthritis discpmfprt 1 Each 0    acetaminophen (TYLENOL) 500 MG Tab Take 500-1,000 mg by mouth every 6 hours as needed.      Multiple Vitamins-Minerals (ONE-A-DAY 50 PLUS PO) Take  by mouth every day.       No current [facility]-ordered facility-administered medications on file.       Allergies:  Patient has no known allergies.    Health Maintenance: Declines pneumonia vaccine.       Objective:     Vital signs reviewed  Exam:  /66 (BP Location: Right arm, Patient Position: Sitting, BP Cuff Size: Adult)   Pulse 71   Temp 36 °C (96.8 °F) (Temporal)   Ht 1.664 m (5' 5.5\")   Wt 85.3 kg (188 lb)   LMP 12/28/2011   SpO2 95%   BMI 30.81 kg/m²  Body mass index is 30.81 kg/m².    Gen: Alert and oriented, No apparent distress.  Neck: Neck is supple, full ROM.  Lungs: Normal effort, no audible wheezes  CV: Skin pink, warm and dry.  Ext: No clubbing, cyanosis, edema.      Assessment & Plan:     63 y.o. female with the following -     Discussed and reviewed lab results from 6/27/2023.    1. Weight gain  Chronic exacerbated problem.  Recommend that she schedule with the dietitian.  Encouraged 150 minutes weekly of exercise, discussed healthy diet.     2. Primary hypertension  Chronic stable problem.  Continue lisinopril 10 mg daily.    3. Pure hypercholesterolemia  Chronic " stable problem.  Continue atorvastatin 20 mg daily.    4. Impaired glucose tolerance  Chronic stable problem.  Continue healthy diet and exercise.      5. Vitamin D deficiency  Chronic stable problem.  Continue daily vitamin D supplementation.        Return in about 6 months (around 12/29/2023) for Hypertension.    Please note that this dictation was created using voice recognition software. I have made every reasonable attempt to correct obvious errors, but I expect that there are errors of grammar and possibly content that I did not discover before finalizing the note.

## 2023-07-05 ENCOUNTER — HOSPITAL ENCOUNTER (OUTPATIENT)
Dept: RADIOLOGY | Facility: MEDICAL CENTER | Age: 63
End: 2023-07-05
Attending: NURSE PRACTITIONER
Payer: COMMERCIAL

## 2023-07-05 DIAGNOSIS — Z12.31 ENCOUNTER FOR SCREENING MAMMOGRAM FOR BREAST CANCER: ICD-10-CM

## 2023-07-05 PROCEDURE — 77063 BREAST TOMOSYNTHESIS BI: CPT

## 2023-07-10 DIAGNOSIS — E78.00 PURE HYPERCHOLESTEROLEMIA: ICD-10-CM

## 2023-07-10 RX ORDER — ATORVASTATIN CALCIUM 20 MG/1
20 TABLET, FILM COATED ORAL
Qty: 90 TABLET | Refills: 0 | Status: SHIPPED | OUTPATIENT
Start: 2023-07-10 | End: 2023-08-29

## 2023-07-10 NOTE — TELEPHONE ENCOUNTER
Requested Prescriptions     Pending Prescriptions Disp Refills   • atorvastatin (LIPITOR) 20 MG Tab [Pharmacy Med Name: ATORVASTATIN 20 MG TABLET] 90 Tablet 0     Sig: TAKE 1 TABLET BY MOUTH EVERY DAY       GERMAIN Garcia.

## 2023-07-25 ENCOUNTER — APPOINTMENT (RX ONLY)
Dept: URBAN - METROPOLITAN AREA CLINIC 22 | Facility: CLINIC | Age: 63
Setting detail: DERMATOLOGY
End: 2023-07-25

## 2023-07-25 DIAGNOSIS — L30.0 NUMMULAR DERMATITIS: ICD-10-CM

## 2023-07-25 PROCEDURE — ? ADDITIONAL NOTES

## 2023-07-25 PROCEDURE — 99203 OFFICE O/P NEW LOW 30 MIN: CPT

## 2023-07-25 PROCEDURE — ? PRESCRIPTION

## 2023-07-25 PROCEDURE — ? COUNSELING

## 2023-07-25 RX ORDER — FLUOCINOLONE ACETONIDE 0.11 MG/ML
1 OIL TOPICAL
Qty: 118.28 | Refills: 3 | Status: ERX | COMMUNITY
Start: 2023-07-25

## 2023-07-25 RX ORDER — TRIAMCINOLONE ACETONIDE 1 MG/G
1 OINTMENT TOPICAL BID
Qty: 80 | Refills: 1 | Status: ERX | COMMUNITY
Start: 2023-07-25

## 2023-07-25 RX ADMIN — FLUOCINOLONE ACETONIDE 1: 0.11 OIL TOPICAL at 00:00

## 2023-07-25 RX ADMIN — TRIAMCINOLONE ACETONIDE 1: 1 OINTMENT TOPICAL at 00:00

## 2023-07-25 ASSESSMENT — LOCATION SIMPLE DESCRIPTION DERM
LOCATION SIMPLE: LEFT POSTERIOR THIGH
LOCATION SIMPLE: RIGHT FOREARM
LOCATION SIMPLE: ABDOMEN
LOCATION SIMPLE: RIGHT LOWER BACK
LOCATION SIMPLE: RIGHT POSTERIOR THIGH
LOCATION SIMPLE: LEFT FOREARM

## 2023-07-25 ASSESSMENT — LOCATION DETAILED DESCRIPTION DERM
LOCATION DETAILED: RIGHT VENTRAL PROXIMAL FOREARM
LOCATION DETAILED: LEFT VENTRAL PROXIMAL FOREARM
LOCATION DETAILED: PERIUMBILICAL SKIN
LOCATION DETAILED: RIGHT SUPERIOR MEDIAL MIDBACK
LOCATION DETAILED: LEFT DISTAL POSTERIOR THIGH
LOCATION DETAILED: RIGHT DISTAL POSTERIOR THIGH

## 2023-07-25 ASSESSMENT — LOCATION ZONE DERM
LOCATION ZONE: TRUNK
LOCATION ZONE: LEG
LOCATION ZONE: ARM

## 2023-07-25 NOTE — PROCEDURE: ADDITIONAL NOTES
Detail Level: Simple
Render Risk Assessment In Note?: no
Additional Notes: Discussed pt switching to hypoallergenic laundry soap/sheets and other personal hygiene products. Advised to take 2 Zyrtec daily to try to help with itching that is particularly worse at night  and switch moisturizer from lotion to cream. Discussed with pt if she has zero improvement at 6 week f/u a bx might be recommended at that point to further characterize or guide underlying cause.

## 2023-08-17 ENCOUNTER — APPOINTMENT (RX ONLY)
Dept: URBAN - METROPOLITAN AREA CLINIC 22 | Facility: CLINIC | Age: 63
Setting detail: DERMATOLOGY
End: 2023-08-17

## 2023-08-17 DIAGNOSIS — L30.9 DERMATITIS, UNSPECIFIED: ICD-10-CM

## 2023-08-17 DIAGNOSIS — L43.8 OTHER LICHEN PLANUS: ICD-10-CM

## 2023-08-17 PROCEDURE — ? ADDITIONAL NOTES

## 2023-08-17 PROCEDURE — 96372 THER/PROPH/DIAG INJ SC/IM: CPT | Mod: 59

## 2023-08-17 PROCEDURE — ? BIOPSY BY SHAVE METHOD

## 2023-08-17 PROCEDURE — ? INTRAMUSCULAR KENALOG

## 2023-08-17 PROCEDURE — 11103 TANGNTL BX SKIN EA SEP/ADDL: CPT

## 2023-08-17 PROCEDURE — 11102 TANGNTL BX SKIN SINGLE LES: CPT

## 2023-08-17 ASSESSMENT — LOCATION ZONE DERM
LOCATION ZONE: LEG
LOCATION ZONE: TRUNK

## 2023-08-17 ASSESSMENT — LOCATION DETAILED DESCRIPTION DERM
LOCATION DETAILED: RIGHT DISTAL POSTERIOR THIGH
LOCATION DETAILED: RIGHT BUTTOCK
LOCATION DETAILED: LEFT INFERIOR UPPER BACK

## 2023-08-17 ASSESSMENT — LOCATION SIMPLE DESCRIPTION DERM
LOCATION SIMPLE: LEFT UPPER BACK
LOCATION SIMPLE: RIGHT POSTERIOR THIGH
LOCATION SIMPLE: RIGHT BUTTOCK

## 2023-08-17 ASSESSMENT — ITCH NUMERIC RATING SCALE: HOW SEVERE IS YOUR ITCHING?: 7

## 2023-08-17 NOTE — PROCEDURE: ADDITIONAL NOTES
Detail Level: Simple
Render Risk Assessment In Note?: no
Additional Notes: Bx x 2 today to further characterize rash. \\n5 month, new rash.   Very itchy, topicals and diligent change to hypoallergenic hygiene protocol with no effect\\nDue to severity of patient symptoms offered IM Kenalog today while awaiting results to further guide treatment and she agrees.

## 2023-08-17 NOTE — PROCEDURE: INTRAMUSCULAR KENALOG
Lot # (Optional): [id number]
Detail Level: None
Total Volume (Ccs): 1
Concentration (Mg/Ml): 40.0
Consent: The risks of atrophy were reviewed with the patient.
Kenalog Preparation: kenalog
Add Option For Additional Mediation: No
Administered By (Optional): Bailey Lai MA
Concentration (Mg/Ml) Of Additional Medication: 2.5
Expiration Date (Optional): 08/24

## 2023-08-17 NOTE — PROCEDURE: BIOPSY BY SHAVE METHOD
Detail Level: Detailed
Depth Of Biopsy: dermis
Was A Bandage Applied: Yes
Size Of Lesion In Cm: 1.2
X Size Of Lesion In Cm: 0
Biopsy Type: H and E
Biopsy Method: Dermablade
Anesthesia Type: 0.05% lidocaine without epinephrine
Anesthesia Volume In Cc: 0.5
Hemostasis: Drysol
Wound Care: Petrolatum
Dressing: bandage
Destruction After The Procedure: No
Type Of Destruction Used: Curettage
Curettage Text: The wound bed was treated with curettage after the biopsy was performed.
Cryotherapy Text: The wound bed was treated with cryotherapy after the biopsy was performed.
Electrodesiccation Text: The wound bed was treated with electrodesiccation after the biopsy was performed.
Electrodesiccation And Curettage Text: The wound bed was treated with electrodesiccation and curettage after the biopsy was performed.
Silver Nitrate Text: The wound bed was treated with silver nitrate after the biopsy was performed.
Lab: [id number]
Lab Facility: 
Consent: Written consent was obtained and risks were reviewed including but not limited to scarring, infection, bleeding, scabbing, incomplete removal, nerve damage and allergy to anesthesia.
Post-Care Instructions: I reviewed with the patient in detail post-care instructions. Patient is to keep the biopsy site dry overnight, and then apply bacitracin twice daily until healed. Patient may apply hydrogen peroxide soaks to remove any crusting.
Notification Instructions: Patient will be notified of biopsy results. However, patient instructed to call the office if not contacted within 2 weeks.
Billing Type: Third-Party Bill
Information: Selecting Yes will display possible errors in your note based on the variables you have selected. This validation is only offered as a suggestion for you. PLEASE NOTE THAT THE VALIDATION TEXT WILL BE REMOVED WHEN YOU FINALIZE YOUR NOTE. IF YOU WANT TO FAX A PRELIMINARY NOTE YOU WILL NEED TO TOGGLE THIS TO 'NO' IF YOU DO NOT WANT IT IN YOUR FAXED NOTE.
Size Of Lesion In Cm: 1

## 2023-08-29 DIAGNOSIS — E78.00 PURE HYPERCHOLESTEROLEMIA: ICD-10-CM

## 2023-08-29 RX ORDER — ATORVASTATIN CALCIUM 20 MG/1
20 TABLET, FILM COATED ORAL
Qty: 90 TABLET | Refills: 3 | Status: SHIPPED | OUTPATIENT
Start: 2023-08-29

## 2023-08-29 NOTE — TELEPHONE ENCOUNTER
Requested Prescriptions     Signed Prescriptions Disp Refills    atorvastatin (LIPITOR) 20 MG Tab 90 Tablet 3     Sig: TAKE 1 TABLET BY MOUTH EVERY DAY     Authorizing Provider: KEILA BAUTISTA A.P.R.N.

## 2023-08-30 ENCOUNTER — APPOINTMENT (RX ONLY)
Dept: URBAN - METROPOLITAN AREA CLINIC 22 | Facility: CLINIC | Age: 63
Setting detail: DERMATOLOGY
End: 2023-08-30

## 2023-08-30 DIAGNOSIS — L20.89 OTHER ATOPIC DERMATITIS: ICD-10-CM

## 2023-08-30 PROCEDURE — ? ORDER TESTS

## 2023-08-30 NOTE — PROCEDURE: ORDER TESTS
Billing Type: Third-Party Bill
Lab Facility: [id number]
Expected Date Of Service: 08/30/2023
Performing Laboratory: -[id number]
Bill For Surgical Tray: no

## 2023-09-14 ENCOUNTER — APPOINTMENT (RX ONLY)
Dept: URBAN - METROPOLITAN AREA CLINIC 22 | Facility: CLINIC | Age: 63
Setting detail: DERMATOLOGY
End: 2023-09-14

## 2023-09-14 DIAGNOSIS — L20.89 OTHER ATOPIC DERMATITIS: ICD-10-CM | Status: IMPROVED

## 2023-09-14 PROCEDURE — ? ADDITIONAL NOTES

## 2023-09-14 PROCEDURE — ? COUNSELING

## 2023-09-14 PROCEDURE — ? PRESCRIPTION

## 2023-09-14 PROCEDURE — 99213 OFFICE O/P EST LOW 20 MIN: CPT

## 2023-09-14 RX ORDER — FLUOCINOLONE ACETONIDE 0.11 MG/ML
1 OIL TOPICAL
Qty: 118.28 | Refills: 3 | Status: CANCELLED

## 2023-09-14 RX ORDER — CLOBETASOL PROPIONATE 0.5 MG/G
OINTMENT TOPICAL BID
Qty: 60 | Refills: 2 | Status: ERX | COMMUNITY
Start: 2023-09-14

## 2023-09-14 RX ADMIN — CLOBETASOL PROPIONATE: 0.5 OINTMENT TOPICAL at 00:00

## 2023-09-14 ASSESSMENT — LOCATION DETAILED DESCRIPTION DERM
LOCATION DETAILED: LEFT DISTAL POSTERIOR THIGH
LOCATION DETAILED: PERIUMBILICAL SKIN
LOCATION DETAILED: LEFT PROXIMAL DORSAL FOREARM
LOCATION DETAILED: RIGHT PROXIMAL DORSAL FOREARM
LOCATION DETAILED: RIGHT DISTAL POSTERIOR THIGH
LOCATION DETAILED: RIGHT SUPERIOR MEDIAL MIDBACK

## 2023-09-14 ASSESSMENT — LOCATION SIMPLE DESCRIPTION DERM
LOCATION SIMPLE: ABDOMEN
LOCATION SIMPLE: RIGHT FOREARM
LOCATION SIMPLE: LEFT FOREARM
LOCATION SIMPLE: RIGHT POSTERIOR THIGH
LOCATION SIMPLE: RIGHT LOWER BACK
LOCATION SIMPLE: LEFT POSTERIOR THIGH

## 2023-09-14 ASSESSMENT — LOCATION ZONE DERM
LOCATION ZONE: TRUNK
LOCATION ZONE: LEG
LOCATION ZONE: ARM

## 2023-09-14 NOTE — PROCEDURE: ADDITIONAL NOTES
Render Risk Assessment In Note?: no
Detail Level: Simple
Additional Notes: Pt did get blood work antigen testing done but results are preliminary for the  and . Pt reports that the kenalog injection has helped and decreased itch and rash. Pt is still continuing to use rx of triamcinolone and oil,  but we are going to increase steroid potency and switch to clobetasol. Pt states her rash and symptoms are about 60% less than previous months.Discussed with patient that we will wait to see the antigen BP blood work.  She has few active lesions today.  Will proceed with DIF if needed for further eval.

## 2024-01-31 ENCOUNTER — APPOINTMENT (RX ONLY)
Dept: URBAN - METROPOLITAN AREA CLINIC 22 | Facility: CLINIC | Age: 64
Setting detail: DERMATOLOGY
End: 2024-01-31

## 2024-01-31 DIAGNOSIS — L20.89 OTHER ATOPIC DERMATITIS: ICD-10-CM

## 2024-01-31 DIAGNOSIS — L82.0 INFLAMED SEBORRHEIC KERATOSIS: ICD-10-CM

## 2024-01-31 PROCEDURE — ? ADDITIONAL NOTES

## 2024-01-31 PROCEDURE — ? LIQUID NITROGEN

## 2024-01-31 PROCEDURE — 99213 OFFICE O/P EST LOW 20 MIN: CPT | Mod: 25

## 2024-01-31 PROCEDURE — ? COUNSELING

## 2024-01-31 PROCEDURE — 17110 DESTRUCTION B9 LES UP TO 14: CPT

## 2024-01-31 ASSESSMENT — LOCATION DETAILED DESCRIPTION DERM
LOCATION DETAILED: LEFT PROXIMAL DORSAL FOREARM
LOCATION DETAILED: RIGHT PROXIMAL DORSAL FOREARM
LOCATION DETAILED: PERIUMBILICAL SKIN
LOCATION DETAILED: RIGHT DISTAL POSTERIOR THIGH
LOCATION DETAILED: RIGHT SUPERIOR MEDIAL MIDBACK
LOCATION DETAILED: LEFT DISTAL POSTERIOR THIGH
LOCATION DETAILED: RIGHT INFERIOR MEDIAL UPPER BACK

## 2024-01-31 ASSESSMENT — LOCATION SIMPLE DESCRIPTION DERM
LOCATION SIMPLE: RIGHT POSTERIOR THIGH
LOCATION SIMPLE: LEFT POSTERIOR THIGH
LOCATION SIMPLE: RIGHT FOREARM
LOCATION SIMPLE: RIGHT UPPER BACK
LOCATION SIMPLE: RIGHT LOWER BACK
LOCATION SIMPLE: ABDOMEN
LOCATION SIMPLE: LEFT FOREARM

## 2024-01-31 ASSESSMENT — LOCATION ZONE DERM
LOCATION ZONE: LEG
LOCATION ZONE: ARM
LOCATION ZONE: TRUNK

## 2024-01-31 NOTE — PROCEDURE: ADDITIONAL NOTES
Render Risk Assessment In Note?: no
Detail Level: Simple
Additional Notes: 1/31/24: Condition is greatly improved today. Pt has minor flares on arms today. Pt reports that condition has been stable and declines allergy testing at this time. \\n\\nPrior:Pt did get blood work antigen testing done but results are preliminary for the  and . Pt reports that the kenalog injection has helped and decreased itch and rash. Pt is still continuing to use rx of triamcinolone and oil,  but we are going to increase steroid potency and switch to clobetasol. Pt states her rash and symptoms are about 60% less than previous months.Discussed with patient that we will wait to see the antigen BP blood work.  She has few active lesions today.  Will proceed with DIF if needed for further eval.

## 2024-01-31 NOTE — PROCEDURE: LIQUID NITROGEN
Medical Necessity Information: It is in your best interest to select a reason for this procedure from the list below. All of these items fulfill various CMS LCD requirements except the new and changing color options.
Show Topical Anesthesia Variable?: Yes
Render Post-Care Instructions In Note?: no
Medical Necessity Clause: This procedure was medically necessary because the lesions that were treated were:
Detail Level: Detailed
Post-Care Instructions: I reviewed with the patient in detail post-care instructions. Patient is to wear sunprotection, and avoid picking at any of the treated lesions. Pt may apply Vaseline to crusted or scabbing areas.
Spray Paint Text: The liquid nitrogen was applied to the skin utilizing a spray paint frosting technique.
Consent: The patient's mom’s consent was obtained including but not limited to risks of crusting, scabbing, blistering, scarring, darker or lighter pigmentary change, recurrence, incomplete removal and infection.

## 2024-01-31 NOTE — HPI: SKIN LESION
Is This A New Presentation, Or A Follow-Up?: Skin Lesion
What Type Of Note Output Would You Prefer (Optional)?: Bullet Format
How Severe Is Your Skin Lesion?: mild
Has Your Skin Lesion Been Treated?: not been treated
Mid-Level Procedure Text (A): After obtaining clear surgical margins the patient was sent to a mid-level provider for surgical repair.  The patient understands they will receive post-surgical care and follow-up from the mid-level provider.

## 2024-03-05 ENCOUNTER — APPOINTMENT (OUTPATIENT)
Dept: MEDICAL GROUP | Facility: PHYSICIAN GROUP | Age: 64
End: 2024-03-05
Payer: COMMERCIAL

## 2024-04-09 ENCOUNTER — APPOINTMENT (OUTPATIENT)
Dept: MEDICAL GROUP | Facility: PHYSICIAN GROUP | Age: 64
End: 2024-04-09
Payer: COMMERCIAL

## 2024-04-09 VITALS
OXYGEN SATURATION: 97 % | WEIGHT: 191 LBS | BODY MASS INDEX: 30.7 KG/M2 | HEART RATE: 65 BPM | HEIGHT: 66 IN | SYSTOLIC BLOOD PRESSURE: 114 MMHG | DIASTOLIC BLOOD PRESSURE: 82 MMHG | TEMPERATURE: 97.1 F

## 2024-04-09 DIAGNOSIS — E78.00 PURE HYPERCHOLESTEROLEMIA: ICD-10-CM

## 2024-04-09 DIAGNOSIS — Z12.11 SCREENING FOR COLORECTAL CANCER: ICD-10-CM

## 2024-04-09 DIAGNOSIS — S86.892A LEFT MEDIAL TIBIAL STRESS SYNDROME, INITIAL ENCOUNTER: ICD-10-CM

## 2024-04-09 DIAGNOSIS — Z12.12 SCREENING FOR COLORECTAL CANCER: ICD-10-CM

## 2024-04-09 DIAGNOSIS — R73.02 IMPAIRED GLUCOSE TOLERANCE: ICD-10-CM

## 2024-04-09 DIAGNOSIS — M72.2 PLANTAR FASCIITIS: ICD-10-CM

## 2024-04-09 DIAGNOSIS — I10 PRIMARY HYPERTENSION: ICD-10-CM

## 2024-04-09 DIAGNOSIS — E55.9 VITAMIN D DEFICIENCY: ICD-10-CM

## 2024-04-09 PROCEDURE — 3074F SYST BP LT 130 MM HG: CPT | Performed by: NURSE PRACTITIONER

## 2024-04-09 PROCEDURE — 99214 OFFICE O/P EST MOD 30 MIN: CPT | Performed by: NURSE PRACTITIONER

## 2024-04-09 PROCEDURE — 3079F DIAST BP 80-89 MM HG: CPT | Performed by: NURSE PRACTITIONER

## 2024-04-09 RX ORDER — CLOBETASOL PROPIONATE 0.5 MG/G
OINTMENT TOPICAL
COMMUNITY

## 2024-04-09 RX ORDER — TRIAMCINOLONE ACETONIDE 1 MG/G
OINTMENT TOPICAL
COMMUNITY

## 2024-04-09 ASSESSMENT — FIBROSIS 4 INDEX: FIB4 SCORE: 1.35

## 2024-04-09 ASSESSMENT — PATIENT HEALTH QUESTIONNAIRE - PHQ9: CLINICAL INTERPRETATION OF PHQ2 SCORE: 0

## 2024-04-09 NOTE — ASSESSMENT & PLAN NOTE
Blood pressure today 114/82. Controlled with lisinopril 10 mg daily. Her blood pressures at [facility] have been similar to today's reading.

## 2024-04-09 NOTE — PROGRESS NOTES
Subjective:     CC: leg pain    HPI:   Jayashree presents today with the following:    Leg pain   The left shin splints and left heel pain started 3-4 weeks ago. The shin pain is intermittent. The left heel pain is intermittent.  Worse at night and first thing in the morning. History of scoliosis, followed by [facility] and getting injections. Ambulating and exercises can help. Plans to try using cane. No problems with going up stairs. Doing calf stretches. No recent falls or trauma.     Hypertension  Blood pressure today 114/82. Controlled with lisinopril 10 mg daily. Her blood pressures at [facility] have been similar to today's reading.       Past Medical History:   Diagnosis Date    Arthritis     Chronic back pain greater than 3 months duration 3/23/2015    Former smoker     Glaucoma     sees [facility].    Healthcare maintenance 3/17/2021    Hypertension     Hypertension 4/15/2013    Scoliosis     Varicose vein        Social History     Tobacco Use    Smoking status: Some Days     Current packs/day: 0.00     Average packs/day: 1 pack/day for 34.0 years (34.0 ttl pk-yrs)     Types: Cigarettes, Cigars     Start date: 1981     Last attempt to quit: 2015     Years since quittin.7    Smokeless tobacco: Never    Tobacco comments:     Quit in 2015   (Hx 1 ppd x 34 yrs). But has 1-2 cigar a week   Vaping Use    Vaping Use: Former    Quit date: 2015   Substance Use Topics    Alcohol use: Yes     Comment: 4 per week; wine and vodka/seltzer    Drug use: No       Current Outpatient Medications Ordered in Epic   Medication Sig Dispense Refill    clobetasol (TEMOVATE) 0.05 % Ointment PLEASE SEE ATTACHED FOR DETAILED DIRECTIONS      triamcinolone acetonide (KENALOG) 0.1 % Ointment APPLY TO ITCHY SPOTS TWICE A DAY FOR 14 DAYS AS NEEDED      atorvastatin (LIPITOR) 20 MG Tab TAKE 1 TABLET BY MOUTH EVERY DAY 90 Tablet 3    Bacillus Coagulans-Inulin (BENEFIBER PREBIOTIC+PROBIOTIC) Chew Tab Chew every day.       "lisinopril (PRINIVIL) 10 MG Tab TAKE 1 TABLET BY MOUTH EVERY DAY 90 Tablet 3    COENZYME Q10 PO Take  by mouth every day.      Omega-3 Fatty Acids (FISH OIL) 1000 MG Cap capsule Take 1,000 mg by mouth 3 times a day with meals.      latanoprost (XALATAN) 0.005 % Solution INSTILL 1 DROP INTO BOTH EYES EVERY NIGHT      Cholecalciferol (VITAMIN D3 PO) Take  by mouth.      HYDROcodone-acetaminophen (NORCO) 7.5-325 MG per tablet Take 1 Tab by mouth every 8 hours as needed.  0    Misc. Devices Misc Hot tub For alleviating pain and Arthritis discpmfprt 1 Each 0    acetaminophen (TYLENOL) 500 MG Tab Take 500-1,000 mg by mouth every 6 hours as needed.      Multiple Vitamins-Minerals (ONE-A-DAY 50 PLUS PO) Take  by mouth every day.       No current [facility]-ordered facility-administered medications on file.       Allergies:  Patient has no known allergies.    Health Maintenance: Reviewed.       Objective:     Vital signs reviewed  Exam:  /82 (BP Location: Right arm, Patient Position: Sitting, BP Cuff Size: Adult)   Pulse 65   Temp 36.2 °C (97.1 °F) (Temporal)   Ht 1.664 m (5' 5.5\")   Wt 86.6 kg (191 lb)   LMP 12/28/2011   SpO2 97%   BMI 31.30 kg/m²  Body mass index is 31.3 kg/m².    Gen: Alert and oriented, No apparent distress.  Lungs: Normal effort, CTA bilaterally, no wheezes, rhonchi, or rales  CV: Regular rate and rhythm. No murmurs, rubs, or gallops.    Assessment & Plan:     64 y.o. female with the following -     1. Left medial tibial stress syndrome, initial encounter  Acute uncomplicated problem.  Discussed may use over-the-counter Voltaren topical, continue stretching and may use Ace bandage for support.    2. Plantar fasciitis  Acute uncomplicated problem.  May use over-the-counter Voltaren topical.  Continue stretching exercises.    3. Primary hypertension  Chronic stable problem.  Continue lisinopril 10 mg daily.  Due for updated labs.  - CBC WITH DIFFERENTIAL; Future  - Comp Metabolic Panel; Future  - " TSH WITH REFLEX TO FT4; Future    4. Pure hypercholesterolemia  Chronic stable problem.  Continue atorvastatin 20 mg daily.  Continue healthy diet and exercise.  Due for updated labs.  - Comp Metabolic Panel; Future  - Lipid Profile; Future    5. Vitamin D deficiency  Chronic stable problem.  Continue daily vitamin D3 supplementation.  - VITAMIN D,25 HYDROXY (DEFICIENCY); Future    6. Impaired glucose tolerance  Chronic stable problem.  Due for updated A1c.  - HEMOGLOBIN A1C; Future    7. Screening for colorectal cancer  Acute uncomplicated problem.  Due for Cologuard.  - COLOGUARD (FIT DNA)      Return in about 6 months (around 10/9/2024) for annual.    Please note that this dictation was created using voice recognition software. I have made every reasonable attempt to correct obvious errors, but I expect that there are errors of grammar and possibly content that I did not discover before finalizing the note.

## 2024-05-08 DIAGNOSIS — I10 ESSENTIAL HYPERTENSION: Chronic | ICD-10-CM

## 2024-05-08 RX ORDER — LISINOPRIL 10 MG/1
10 TABLET ORAL DAILY
Qty: 90 TABLET | Refills: 3 | Status: SHIPPED | OUTPATIENT
Start: 2024-05-08

## 2024-05-14 ENCOUNTER — APPOINTMENT (RX ONLY)
Dept: URBAN - METROPOLITAN AREA CLINIC 22 | Facility: CLINIC | Age: 64
Setting detail: DERMATOLOGY
End: 2024-05-14

## 2024-05-14 DIAGNOSIS — D22 MELANOCYTIC NEVI: ICD-10-CM

## 2024-05-14 DIAGNOSIS — D18.0 HEMANGIOMA: ICD-10-CM

## 2024-05-14 DIAGNOSIS — L81.4 OTHER MELANIN HYPERPIGMENTATION: ICD-10-CM

## 2024-05-14 DIAGNOSIS — L57.0 ACTINIC KERATOSIS: ICD-10-CM

## 2024-05-14 DIAGNOSIS — L82.1 OTHER SEBORRHEIC KERATOSIS: ICD-10-CM

## 2024-05-14 DIAGNOSIS — Z71.89 OTHER SPECIFIED COUNSELING: ICD-10-CM

## 2024-05-14 DIAGNOSIS — L20.89 OTHER ATOPIC DERMATITIS: ICD-10-CM

## 2024-05-14 PROBLEM — D22.5 MELANOCYTIC NEVI OF TRUNK: Status: ACTIVE | Noted: 2024-05-14

## 2024-05-14 PROBLEM — D18.01 HEMANGIOMA OF SKIN AND SUBCUTANEOUS TISSUE: Status: ACTIVE | Noted: 2024-05-14

## 2024-05-14 PROCEDURE — ? COUNSELING

## 2024-05-14 PROCEDURE — ? PHOTO-DOCUMENTATION

## 2024-05-14 PROCEDURE — ? LIQUID NITROGEN

## 2024-05-14 PROCEDURE — 99214 OFFICE O/P EST MOD 30 MIN: CPT | Mod: 25

## 2024-05-14 PROCEDURE — ? SUNSCREEN RECOMMENDATIONS

## 2024-05-14 PROCEDURE — 17000 DESTRUCT PREMALG LESION: CPT

## 2024-05-14 PROCEDURE — ? DIAGNOSIS COMMENT

## 2024-05-14 PROCEDURE — ? ADDITIONAL NOTES

## 2024-05-14 PROCEDURE — ? PRESCRIPTION

## 2024-05-14 RX ORDER — FLUOCINOLONE ACETONIDE 0.11 MG/ML
OIL TOPICAL
Qty: 118.28 | Refills: 1 | Status: ERX

## 2024-05-14 RX ORDER — CLOBETASOL PROPIONATE 0.5 MG/G
OINTMENT TOPICAL BID
Qty: 60 | Refills: 1 | Status: ERX

## 2024-05-14 RX ORDER — PIMECROLIMUS 10 MG/G
CREAM TOPICAL QD
Qty: 60 | Refills: 2 | Status: ERX | COMMUNITY
Start: 2024-05-14

## 2024-05-14 RX ADMIN — PIMECROLIMUS: 10 CREAM TOPICAL at 00:00

## 2024-05-14 ASSESSMENT — LOCATION DETAILED DESCRIPTION DERM
LOCATION DETAILED: LEFT LATERAL ABDOMEN
LOCATION DETAILED: RIGHT PROXIMAL DORSAL FOREARM
LOCATION DETAILED: LEFT DISTAL POSTERIOR THIGH
LOCATION DETAILED: RIGHT LATERAL MALAR CHEEK
LOCATION DETAILED: RIGHT MID-UPPER BACK
LOCATION DETAILED: RIGHT SUPERIOR MEDIAL MIDBACK
LOCATION DETAILED: LEFT INFERIOR UPPER BACK
LOCATION DETAILED: LEFT MEDIAL SUPERIOR CHEST
LOCATION DETAILED: PERIUMBILICAL SKIN
LOCATION DETAILED: RIGHT DISTAL POSTERIOR THIGH
LOCATION DETAILED: LEFT PROXIMAL DORSAL FOREARM

## 2024-05-14 ASSESSMENT — LOCATION SIMPLE DESCRIPTION DERM
LOCATION SIMPLE: RIGHT UPPER BACK
LOCATION SIMPLE: RIGHT FOREARM
LOCATION SIMPLE: LEFT FOREARM
LOCATION SIMPLE: LEFT POSTERIOR THIGH
LOCATION SIMPLE: ABDOMEN
LOCATION SIMPLE: RIGHT CHEEK
LOCATION SIMPLE: RIGHT POSTERIOR THIGH
LOCATION SIMPLE: RIGHT LOWER BACK
LOCATION SIMPLE: CHEST
LOCATION SIMPLE: LEFT UPPER BACK

## 2024-05-14 ASSESSMENT — LOCATION ZONE DERM
LOCATION ZONE: LEG
LOCATION ZONE: ARM
LOCATION ZONE: FACE
LOCATION ZONE: TRUNK

## 2024-05-14 ASSESSMENT — BSA RASH: BSA RASH: 10

## 2024-05-14 NOTE — PROCEDURE: LIQUID NITROGEN
Show Applicator Variable?: Yes
Render Note In Bullet Format When Appropriate: No
Consent: The patient's consent was obtained including but not limited to risks of crusting, scabbing, blistering, scarring, darker or lighter pigmentary change, recurrence, incomplete removal and infection.
Detail Level: Detailed
Duration Of Freeze Thaw-Cycle (Seconds): 3
Number Of Freeze-Thaw Cycles: 2 freeze-thaw cycles
Post-Care Instructions: I reviewed with the patient in detail post-care instructions. Patient is to wear sunprotection, and avoid picking at any of the treated lesions. Pt may apply Vaseline to crusted or scabbing areas.

## 2024-05-14 NOTE — PROCEDURE: ADDITIONAL NOTES
Render Risk Assessment In Note?: no
Detail Level: Simple
Additional Notes: 5/14/24: Pt is still flared today. Discussed systemic rx if pt is still flared in the fall. Pt given non steroidal topical for daily maintenance.\\n\\n1/31/24: Condition is greatly improved today. Pt has minor flares on arms today. Pt reports that condition has been stable and declines allergy testing at this time. \\n\\nPrior:Pt did get blood work antigen testing done but results are preliminary for the  and . Pt reports that the kenalog injection has helped and decreased itch and rash. Pt is still continuing to use rx of triamcinolone and oil,  but we are going to increase steroid potency and switch to clobetasol. Pt states her rash and symptoms are about 60% less than previous months.Discussed with patient that we will wait to see the antigen BP blood work.  She has few active lesions today.  Will proceed with DIF if needed for further eval.
Additional Notes: Monitor.

## 2024-07-23 ENCOUNTER — HOSPITAL ENCOUNTER (OUTPATIENT)
Dept: LAB | Facility: MEDICAL CENTER | Age: 64
End: 2024-07-23
Attending: NURSE PRACTITIONER
Payer: COMMERCIAL

## 2024-07-23 DIAGNOSIS — E78.00 PURE HYPERCHOLESTEROLEMIA: ICD-10-CM

## 2024-07-23 DIAGNOSIS — E55.9 VITAMIN D DEFICIENCY: ICD-10-CM

## 2024-07-23 DIAGNOSIS — R73.02 IMPAIRED GLUCOSE TOLERANCE: ICD-10-CM

## 2024-07-23 DIAGNOSIS — I10 PRIMARY HYPERTENSION: ICD-10-CM

## 2024-07-23 LAB
25(OH)D3 SERPL-MCNC: 44 NG/ML (ref 30–100)
ALBUMIN SERPL BCP-MCNC: 4.4 G/DL (ref 3.2–4.9)
ALBUMIN/GLOB SERPL: 1.8 G/DL
ALP SERPL-CCNC: 63 U/L (ref 30–99)
ALT SERPL-CCNC: 32 U/L (ref 2–50)
ANION GAP SERPL CALC-SCNC: 12 MMOL/L (ref 7–16)
AST SERPL-CCNC: 28 U/L (ref 12–45)
BASOPHILS # BLD AUTO: 1.2 % (ref 0–1.8)
BASOPHILS # BLD: 0.06 K/UL (ref 0–0.12)
BILIRUB SERPL-MCNC: 0.4 MG/DL (ref 0.1–1.5)
BUN SERPL-MCNC: 15 MG/DL (ref 8–22)
CALCIUM ALBUM COR SERPL-MCNC: 9.5 MG/DL (ref 8.5–10.5)
CALCIUM SERPL-MCNC: 9.8 MG/DL (ref 8.5–10.5)
CHLORIDE SERPL-SCNC: 106 MMOL/L (ref 96–112)
CHOLEST SERPL-MCNC: 192 MG/DL (ref 100–199)
CO2 SERPL-SCNC: 21 MMOL/L (ref 20–33)
CREAT SERPL-MCNC: 0.91 MG/DL (ref 0.5–1.4)
EOSINOPHIL # BLD AUTO: 0.16 K/UL (ref 0–0.51)
EOSINOPHIL NFR BLD: 3.1 % (ref 0–6.9)
ERYTHROCYTE [DISTWIDTH] IN BLOOD BY AUTOMATED COUNT: 45.9 FL (ref 35.9–50)
EST. AVERAGE GLUCOSE BLD GHB EST-MCNC: 117 MG/DL
FASTING STATUS PATIENT QL REPORTED: NORMAL
GFR SERPLBLD CREATININE-BSD FMLA CKD-EPI: 70 ML/MIN/1.73 M 2
GLOBULIN SER CALC-MCNC: 2.5 G/DL (ref 1.9–3.5)
GLUCOSE SERPL-MCNC: 102 MG/DL (ref 65–99)
HBA1C MFR BLD: 5.7 % (ref 4–5.6)
HCT VFR BLD AUTO: 45.6 % (ref 37–47)
HDLC SERPL-MCNC: 55 MG/DL
HGB BLD-MCNC: 14.7 G/DL (ref 12–16)
IMM GRANULOCYTES # BLD AUTO: 0.01 K/UL (ref 0–0.11)
IMM GRANULOCYTES NFR BLD AUTO: 0.2 % (ref 0–0.9)
LDLC SERPL CALC-MCNC: 95 MG/DL
LYMPHOCYTES # BLD AUTO: 1.3 K/UL (ref 1–4.8)
LYMPHOCYTES NFR BLD: 25.4 % (ref 22–41)
MCH RBC QN AUTO: 29.1 PG (ref 27–33)
MCHC RBC AUTO-ENTMCNC: 32.2 G/DL (ref 32.2–35.5)
MCV RBC AUTO: 90.1 FL (ref 81.4–97.8)
MONOCYTES # BLD AUTO: 0.49 K/UL (ref 0–0.85)
MONOCYTES NFR BLD AUTO: 9.6 % (ref 0–13.4)
NEUTROPHILS # BLD AUTO: 3.1 K/UL (ref 1.82–7.42)
NEUTROPHILS NFR BLD: 60.5 % (ref 44–72)
NRBC # BLD AUTO: 0 K/UL
NRBC BLD-RTO: 0 /100 WBC (ref 0–0.2)
PLATELET # BLD AUTO: 305 K/UL (ref 164–446)
PMV BLD AUTO: 10.4 FL (ref 9–12.9)
POTASSIUM SERPL-SCNC: 4 MMOL/L (ref 3.6–5.5)
PROT SERPL-MCNC: 6.9 G/DL (ref 6–8.2)
RBC # BLD AUTO: 5.06 M/UL (ref 4.2–5.4)
SODIUM SERPL-SCNC: 139 MMOL/L (ref 135–145)
TRIGL SERPL-MCNC: 211 MG/DL (ref 0–149)
TSH SERPL DL<=0.005 MIU/L-ACNC: 4.16 UIU/ML (ref 0.38–5.33)
WBC # BLD AUTO: 5.1 K/UL (ref 4.8–10.8)

## 2024-07-23 PROCEDURE — 83036 HEMOGLOBIN GLYCOSYLATED A1C: CPT

## 2024-07-23 PROCEDURE — 36415 COLL VENOUS BLD VENIPUNCTURE: CPT

## 2024-07-23 PROCEDURE — 85025 COMPLETE CBC W/AUTO DIFF WBC: CPT

## 2024-07-23 PROCEDURE — 82306 VITAMIN D 25 HYDROXY: CPT

## 2024-07-23 PROCEDURE — 84443 ASSAY THYROID STIM HORMONE: CPT

## 2024-07-23 PROCEDURE — 80053 COMPREHEN METABOLIC PANEL: CPT

## 2024-07-23 PROCEDURE — 80061 LIPID PANEL: CPT

## 2024-08-05 ENCOUNTER — PATIENT MESSAGE (OUTPATIENT)
Dept: HEALTH INFORMATION MANAGEMENT | Facility: OTHER | Age: 64
End: 2024-08-05

## 2024-08-31 ENCOUNTER — OFFICE VISIT (OUTPATIENT)
Dept: URGENT CARE | Facility: PHYSICIAN GROUP | Age: 64
End: 2024-08-31
Payer: COMMERCIAL

## 2024-08-31 VITALS
WEIGHT: 183.2 LBS | HEIGHT: 66 IN | TEMPERATURE: 98 F | RESPIRATION RATE: 16 BRPM | BODY MASS INDEX: 29.44 KG/M2 | HEART RATE: 90 BPM | DIASTOLIC BLOOD PRESSURE: 82 MMHG | OXYGEN SATURATION: 96 % | SYSTOLIC BLOOD PRESSURE: 126 MMHG

## 2024-08-31 DIAGNOSIS — Z87.39 PERSONAL HISTORY OF SCOLIOSIS: ICD-10-CM

## 2024-08-31 DIAGNOSIS — M62.830 LUMBAR PARASPINAL MUSCLE SPASM: ICD-10-CM

## 2024-08-31 RX ORDER — CYCLOBENZAPRINE HCL 10 MG
10 TABLET ORAL 3 TIMES DAILY PRN
Qty: 30 TABLET | Refills: 0 | Status: SHIPPED | OUTPATIENT
Start: 2024-08-31

## 2024-08-31 ASSESSMENT — ENCOUNTER SYMPTOMS: BACK PAIN: 1

## 2024-08-31 ASSESSMENT — FIBROSIS 4 INDEX: FIB4 SCORE: 1.04

## 2024-08-31 NOTE — PROGRESS NOTES
Subjective     Jayashree Anthony is a 64 y.o. female who presents with Back Pain (States has scoliosis, and hx of arthritis in lower back. Describes pain as 5-7, but flares up at night X 8 days.)            Back Pain  This is a new problem. Episode onset: pt reports new onset of back pain that started about one week ago. reports hx of scoliosis and has chronic back pain. reports new, worsening back pain since camping recently. reports back pain is worse at night. she cannot get comfortable at night. The pain is present in the lumbar spine. The pain radiates to the right thigh. The pain is Worse during the night. (Reports she is established with [facility], recently had a facet injection a few weeks ago) Treatments tried: tylenol, hydrocodone, arthritis cream. The treatment provided no relief.       Review of Systems   Musculoskeletal:  Positive for back pain.   All other systems reviewed and are negative.         Past Medical History:   Diagnosis Date    Arthritis     Chronic back pain greater than 3 months duration 3/23/2015    Former smoker     Glaucoma     sees [facility].    Healthcare maintenance 3/17/2021    Hypertension     Hypertension 4/15/2013    Scoliosis     Varicose vein       Past Surgical History:   Procedure Laterality Date    CERVICAL FUSION POSTERIOR  12/27/2011    Performed by ROBERT SIGALA at [facility]    CERVICAL LAMINECTOMY POSTERIOR  12/27/2011    Performed by ROBERT SIGALA at [facility]    OTHER ORTHOPEDIC SURGERY  1968    right  arm orif      Social History     Socioeconomic History    Marital status:      Spouse name: Not on file    Number of children: Not on file    Years of education: Not on file    Highest education level: Associate degree: academic program   Occupational History    Not on file   Tobacco Use    Smoking status: Some Days     Current packs/day: 0.00     Average packs/day: 1 pack/day for 34.0 years (34.0 ttl pk-yrs)     Types: Cigarettes,  Cigars     Start date: 1981     Last attempt to quit: 2015     Years since quittin.1    Smokeless tobacco: Never    Tobacco comments:     Quit in 2015   (Hx 1 ppd x 34 yrs). But has 1-2 cigar a week   Vaping Use    Vaping status: Former    Quit date: 2015   Substance and Sexual Activity    Alcohol use: Yes     Comment: 4 per week; wine and vodka/seltzer    Drug use: No    Sexual activity: Yes     Partners: Male     Comment:    Other Topics Concern     Service No    Blood Transfusions No    Caffeine Concern No    Occupational Exposure No    Hobby Hazards Yes     Comment: motorcycle riding     Sleep Concern Yes     Comment: pain    Stress Concern Yes    Weight Concern Yes    Special Diet No    Back Care Yes    Exercise Yes     Comment: tries     Bike Helmet No    Seat Belt Yes    Self-Exams Yes   Social History Narrative    Not on file     Social Determinants of Health     Financial Resource Strain: Low Risk  (10/19/2020)    Overall Financial Resource Strain (CARDIA)     Difficulty of Paying Living Expenses: Not very hard   Food Insecurity: Food Insecurity Present (10/19/2020)    Hunger Vital Sign     Worried About Running Out of Food in the Last Year: Sometimes true     Ran Out of Food in the Last Year: Never true   Transportation Needs: No Transportation Needs (10/19/2020)    PRAPARE - Transportation     Lack of Transportation (Medical): No     Lack of Transportation (Non-Medical): No   Physical Activity: Insufficiently Active (10/19/2020)    Exercise Vital Sign     Days of Exercise per Week: 5 days     Minutes of Exercise per Session: 20 min   Stress: No Stress Concern Present (10/19/2020)    [employer] - Occupational Stress Questionnaire     Feeling of Stress : Not at all   Social Connections: Unknown (10/19/2020)    Social Connection and Isolation Panel [NHANES]     Frequency of Communication with Friends and Family: More than three times a week      "Frequency of Social Gatherings with Friends and Family: Once a week     Attends Yazdanism Services: Patient declined     Active Member of Clubs or Organizations: No     Attends Club or Organization Meetings: Patient declined     Marital Status:    Intimate Partner Violence: Not on file   Housing Stability: Low Risk  (10/19/2020)    Housing Stability Vital Sign     Unable to Pay for Housing in the Last Year: No     Number of Places Lived in the Last Year: 1     Unstable Housing in the Last Year: No         Objective     /82 (BP Location: Left arm, Patient Position: Sitting, BP Cuff Size: Adult long)   Pulse 90   Temp 36.7 °C (98 °F) (Temporal)   Resp 16   Ht 1.664 m (5' 5.5\")   Wt 83.1 kg (183 lb 3.2 oz)   LMP 12/28/2011   SpO2 96%   BMI 30.02 kg/m²      Physical Exam  Vitals and nursing note reviewed.   Constitutional:       Appearance: Normal appearance. She is normal weight.   HENT:      Head: Normocephalic and atraumatic.      Nose: Nose normal.      Mouth/Throat:      Mouth: Mucous membranes are moist.      Pharynx: Oropharynx is clear.   Eyes:      Extraocular Movements: Extraocular movements intact.      Pupils: Pupils are equal, round, and reactive to light.   Cardiovascular:      Rate and Rhythm: Normal rate and regular rhythm.   Pulmonary:      Effort: Pulmonary effort is normal.   Musculoskeletal:         General: Normal range of motion.      Cervical back: Normal range of motion.        Back:    Skin:     General: Skin is warm and dry.      Capillary Refill: Capillary refill takes less than 2 seconds.   Neurological:      General: No focal deficit present.      Mental Status: She is alert and oriented to person, place, and time. Mental status is at baseline.   Psychiatric:         Mood and Affect: Mood normal.         Speech: Speech normal.         Thought Content: Thought content normal.         Judgment: Judgment normal.                             Assessment & Plan      "   Assessment & Plan  Lumbar paraspinal muscle spasm    Orders:    cyclobenzaprine (FLEXERIL) 10 mg Tab; Take 1 Tablet by mouth 3 times a day as needed for Muscle Spasms.    Personal history of scoliosis          Sedating effects of flexeril discussed  She was recently on a course of steroids less than a month ago, do not want to give more today, she understands  She cannot take ibuprofen d/t ulcer  Cannot take hydrocodone and flexeril together  Please follow up with spine  Supportive care, differential diagnoses, and indications for immediate follow-up discussed with patient.    Pathogenesis of diagnosis discussed including typical length and natural progression.    Instructed to return to  or nearest emergency department if symptoms fail to improve, for any change in condition, further concerns, or new concerning symptoms.  Patient states understanding of the plan of care and discharge instructions.

## 2024-09-03 ENCOUNTER — OFFICE VISIT (OUTPATIENT)
Dept: MEDICAL GROUP | Facility: PHYSICIAN GROUP | Age: 64
End: 2024-09-03
Payer: COMMERCIAL

## 2024-09-03 VITALS
HEIGHT: 66 IN | TEMPERATURE: 97.3 F | DIASTOLIC BLOOD PRESSURE: 72 MMHG | BODY MASS INDEX: 30.53 KG/M2 | OXYGEN SATURATION: 95 % | SYSTOLIC BLOOD PRESSURE: 114 MMHG | HEART RATE: 74 BPM | WEIGHT: 190 LBS

## 2024-09-03 DIAGNOSIS — M54.50 ACUTE RIGHT-SIDED LOW BACK PAIN WITHOUT SCIATICA: ICD-10-CM

## 2024-09-03 PROCEDURE — 3078F DIAST BP <80 MM HG: CPT | Performed by: NURSE PRACTITIONER

## 2024-09-03 PROCEDURE — 3074F SYST BP LT 130 MM HG: CPT | Performed by: NURSE PRACTITIONER

## 2024-09-03 PROCEDURE — 99213 OFFICE O/P EST LOW 20 MIN: CPT | Performed by: NURSE PRACTITIONER

## 2024-09-03 RX ORDER — KETOROLAC TROMETHAMINE 15 MG/ML
15 INJECTION, SOLUTION INTRAMUSCULAR; INTRAVENOUS ONCE
Status: COMPLETED | OUTPATIENT
Start: 2024-09-03 | End: 2024-09-03

## 2024-09-03 RX ORDER — FLUOCINOLONE ACETONIDE 0.11 MG/ML
OIL TOPICAL
COMMUNITY
Start: 2024-06-08

## 2024-09-03 RX ORDER — PIMECROLIMUS 10 MG/G
CREAM TOPICAL
COMMUNITY

## 2024-09-03 RX ADMIN — KETOROLAC TROMETHAMINE 15 MG: 15 INJECTION, SOLUTION INTRAMUSCULAR; INTRAVENOUS at 10:40

## 2024-09-03 RX ADMIN — KETOROLAC TROMETHAMINE 15 MG: 15 INJECTION, SOLUTION INTRAMUSCULAR; INTRAVENOUS at 10:41

## 2024-09-03 ASSESSMENT — FIBROSIS 4 INDEX: FIB4 SCORE: 1.04

## 2024-09-03 NOTE — PROGRESS NOTES
Subjective:     CC: low back pain    HPI:   Jayashree presents today with the following:    Low back pain  The low back pain started 1.5 weeks ago. She had scrubbed the floor on her hands and knees prior to pain. She had facet injections 2024 with Miriam Minaya. She is followed by Miriam Minaya and has upcoming October appointment with her provider.  The pain is worse in the morning, described as knife in back. Today pain is more throbbing and goes up right side of back. She tries to change position. No alleviating factors. She has history of arthritis in lower back. She has taken tylenol and hydrocodone. She has seen urgent care 2024 and prescribed flexeril. States flexeril didn't work but helps her sleep. She wants to make sure that she does not have an infection or blood clot today. Reports 4-5 years ago had flare that improved with position changes. Hot tub is helping. Her legs feel weak. She had chills when first started and nothing since. Denies fever, chills, bowel or bladder incontinence, saddle anesthesia, dysuria, hematuria, urinary frequency, falls or trauma.         Past Medical History:   Diagnosis Date    Arthritis     Chronic back pain greater than 3 months duration 3/23/2015    Former smoker     Glaucoma     sees [facility].    Healthcare maintenance 3/17/2021    Hypertension     Hypertension 4/15/2013    Scoliosis     Varicose vein        Social History     Tobacco Use    Smoking status: Some Days     Current packs/day: 0.00     Average packs/day: 1 pack/day for 34.0 years (34.0 ttl pk-yrs)     Types: Cigarettes, Cigars     Start date: 1981     Last attempt to quit: 2015     Years since quittin.1    Smokeless tobacco: Never    Tobacco comments:     Quit in 2015   (Hx 1 ppd x 34 yrs). But has 1-2 cigar a week   Vaping Use    Vaping status: Former    Quit date: 2015   Substance Use Topics    Alcohol use: Yes     Comment: 4 per week; wine and vodka/seltzer    Drug use: No       Current  Outpatient Medications Ordered in Epic   Medication Sig Dispense Refill    Fluocinolone Acetonide Body 0.01 % Oil APPLY TO DAMP SKIN NECK DOWN DAILY FOR 10 DAYS THEN TWICE WEEKLY FOR MAINTENANCE      pimecrolimus (ELIDEL) 1 % cream APPLY TO AFFECTED AREAS OF FACE OR BODY DAILY FOR MILD FLARES OR MAINTENANCE      cyclobenzaprine (FLEXERIL) 10 mg Tab Take 1 Tablet by mouth 3 times a day as needed for Muscle Spasms. 30 Tablet 0    lisinopril (PRINIVIL) 10 MG Tab TAKE 1 TABLET BY MOUTH EVERY DAY 90 Tablet 3    clobetasol (TEMOVATE) 0.05 % Ointment PLEASE SEE ATTACHED FOR DETAILED DIRECTIONS      triamcinolone acetonide (KENALOG) 0.1 % Ointment APPLY TO ITCHY SPOTS TWICE A DAY FOR 14 DAYS AS NEEDED      atorvastatin (LIPITOR) 20 MG Tab TAKE 1 TABLET BY MOUTH EVERY DAY 90 Tablet 3    Bacillus Coagulans-Inulin (BENEFIBER PREBIOTIC+PROBIOTIC) Chew Tab Chew every day.      COENZYME Q10 PO Take  by mouth every day.      Omega-3 Fatty Acids (FISH OIL) 1000 MG Cap capsule Take 1,000 mg by mouth 3 times a day with meals.      latanoprost (XALATAN) 0.005 % Solution INSTILL 1 DROP INTO BOTH EYES EVERY NIGHT      Cholecalciferol (VITAMIN D3 PO) Take  by mouth.      HYDROcodone-acetaminophen (NORCO) 7.5-325 MG per tablet Take 1 Tab by mouth every 8 hours as needed.  0    Misc. Devices Misc Hot tub For alleviating pain and Arthritis discpmfprt 1 Each 0    acetaminophen (TYLENOL) 500 MG Tab Take 500-1,000 mg by mouth every 6 hours as needed.      Multiple Vitamins-Minerals (ONE-A-DAY 50 PLUS PO) Take  by mouth every day.       Current Facility-Administered Medications Ordered in Epic   Medication Dose Route Frequency Provider Last Rate Last Admin    ketorolac (Toradol) 15 MG/ML injection 15 mg  15 mg Intramuscular Once         ketorolac (Toradol) 15 MG/ML injection 15 mg  15 mg Intramuscular Once            Allergies:  Patient has no known allergies.    Health Maintenance: reviewed       Objective:     Vital signs reviewed  Exam:  BP  "114/72 (BP Location: Right arm, Patient Position: Sitting, BP Cuff Size: Adult)   Pulse 74   Temp 36.3 °C (97.3 °F) (Temporal)   Ht 1.664 m (5' 5.5\")   Wt 86.2 kg (190 lb)   LMP 12/28/2011   SpO2 95%   BMI 31.14 kg/m²  Body mass index is 31.14 kg/m².    Gen: Alert and oriented, No apparent distress.  Neck: Neck is supple, full ROM.  Lungs: Normal effort, no audible wheezes  CV: Skin pink, warm and dry.  Back:   lumbar spine without obvious deformity, no bruising, swelling or redness. Tenderness to lumbar spine and right paraspinal lumbar muscles.       Assessment & Plan:     64 y.o. female with the following -     1. Acute right-sided low back pain without sciatica  Acute uncomplicated problem.  [facility]  We discussed signs symptoms of infection and blood clots.  Low suspicion for infection or clot today.  We discussed Toradol 30 mg in office today and she is in agreement.  Continue with Norco that is prescribed for her pain management provider.  Continue ice and heat.  - ketorolac (Toradol) 15 MG/ML injection 15 mg  - ketorolac (Toradol) 15 MG/ML injection 15 mg      Return if symptoms worsen or fail to improve.    Please note that this dictation was created using voice recognition software. I have made every reasonable attempt to correct obvious errors, but I expect that there are errors of grammar and possibly content that I did not discover before finalizing the note.        "

## 2024-09-24 DIAGNOSIS — E78.00 PURE HYPERCHOLESTEROLEMIA: ICD-10-CM

## 2024-09-24 RX ORDER — ATORVASTATIN CALCIUM 20 MG/1
20 TABLET, FILM COATED ORAL
Qty: 90 TABLET | Refills: 3 | Status: SHIPPED | OUTPATIENT
Start: 2024-09-24

## 2024-09-24 NOTE — TELEPHONE ENCOUNTER
Received request via: Pharmacy    Was the patient seen in the last year in this department? Yes    Does the patient have an active prescription (recently filled or refills available) for medication(s) requested? No    Pharmacy Name: Prescription faxed to:    [facility]/pharmacy #[id number] - [place] - [address]  [address]  [place] [address]  Phone: [phone] Fax: [phone]  .    Does the patient have halfway Plus and need 100-day supply? (This applies to ALL medications) Patient does not have SCP

## 2024-10-16 ENCOUNTER — OFFICE VISIT (OUTPATIENT)
Dept: MEDICAL GROUP | Facility: PHYSICIAN GROUP | Age: 64
End: 2024-10-16
Payer: COMMERCIAL

## 2024-10-16 VITALS
BODY MASS INDEX: 31.82 KG/M2 | TEMPERATURE: 96.8 F | HEIGHT: 65 IN | DIASTOLIC BLOOD PRESSURE: 70 MMHG | HEART RATE: 77 BPM | SYSTOLIC BLOOD PRESSURE: 104 MMHG | WEIGHT: 191 LBS | OXYGEN SATURATION: 97 %

## 2024-10-16 DIAGNOSIS — I10 PRIMARY HYPERTENSION: ICD-10-CM

## 2024-10-16 DIAGNOSIS — Z12.4 PAP SMEAR FOR CERVICAL CANCER SCREENING: ICD-10-CM

## 2024-10-16 DIAGNOSIS — R73.03 PREDIABETES: ICD-10-CM

## 2024-10-16 DIAGNOSIS — Z76.89 ENCOUNTER TO ESTABLISH CARE: ICD-10-CM

## 2024-10-16 DIAGNOSIS — E78.00 PURE HYPERCHOLESTEROLEMIA: ICD-10-CM

## 2024-10-16 DIAGNOSIS — Z87.891 HISTORY OF CIGARETTE SMOKING: ICD-10-CM

## 2024-10-16 DIAGNOSIS — I83.93 ASYMPTOMATIC VARICOSE VEINS OF BOTH LOWER EXTREMITIES: ICD-10-CM

## 2024-10-16 DIAGNOSIS — G89.29 CHRONIC BACK PAIN GREATER THAN 3 MONTHS DURATION: ICD-10-CM

## 2024-10-16 DIAGNOSIS — M54.9 CHRONIC BACK PAIN GREATER THAN 3 MONTHS DURATION: ICD-10-CM

## 2024-10-16 DIAGNOSIS — Z00.01 ANNUAL VISIT FOR GENERAL ADULT MEDICAL EXAMINATION WITH ABNORMAL FINDINGS: ICD-10-CM

## 2024-10-16 PROCEDURE — 3078F DIAST BP <80 MM HG: CPT | Performed by: NURSE PRACTITIONER

## 2024-10-16 PROCEDURE — 99396 PREV VISIT EST AGE 40-64: CPT | Performed by: NURSE PRACTITIONER

## 2024-10-16 PROCEDURE — 3074F SYST BP LT 130 MM HG: CPT | Performed by: NURSE PRACTITIONER

## 2024-10-16 RX ORDER — METHYLPREDNISOLONE 4 MG/1
TABLET ORAL
COMMUNITY
Start: 2024-08-19 | End: 2024-10-16

## 2024-10-16 RX ORDER — METHYLPREDNISOLONE 4 MG/1
TABLET ORAL
COMMUNITY
End: 2024-10-16

## 2024-10-16 RX ORDER — HYDROCODONE BITARTRATE AND ACETAMINOPHEN 10; 325 MG/1; MG/1
TABLET ORAL
COMMUNITY
Start: 2024-10-08 | End: 2024-10-16

## 2024-10-16 RX ORDER — CYCLOBENZAPRINE HCL 10 MG
1 TABLET ORAL 3 TIMES DAILY PRN
COMMUNITY
End: 2024-10-16

## 2024-10-16 ASSESSMENT — FIBROSIS 4 INDEX: FIB4 SCORE: 1.04

## 2024-10-17 ENCOUNTER — TELEPHONE (OUTPATIENT)
Dept: HEALTH INFORMATION MANAGEMENT | Facility: OTHER | Age: 64
End: 2024-10-17
Payer: COMMERCIAL

## 2024-11-13 ENCOUNTER — APPOINTMENT (RX ONLY)
Dept: URBAN - METROPOLITAN AREA CLINIC 22 | Facility: CLINIC | Age: 64
Setting detail: DERMATOLOGY
End: 2024-11-13

## 2024-11-13 DIAGNOSIS — L20.89 OTHER ATOPIC DERMATITIS: ICD-10-CM | Status: INADEQUATELY CONTROLLED

## 2024-11-13 PROCEDURE — ? DUPIXENT INITIATION

## 2024-11-13 PROCEDURE — ? ADDITIONAL NOTES

## 2024-11-13 PROCEDURE — 99214 OFFICE O/P EST MOD 30 MIN: CPT

## 2024-11-13 PROCEDURE — ? DIAGNOSIS COMMENT

## 2024-11-13 PROCEDURE — ? COUNSELING

## 2024-11-13 ASSESSMENT — LOCATION DETAILED DESCRIPTION DERM
LOCATION DETAILED: RIGHT DISTAL POSTERIOR THIGH
LOCATION DETAILED: RIGHT SUPERIOR MEDIAL MIDBACK
LOCATION DETAILED: LEFT DISTAL POSTERIOR THIGH
LOCATION DETAILED: PERIUMBILICAL SKIN
LOCATION DETAILED: LEFT PROXIMAL DORSAL FOREARM
LOCATION DETAILED: RIGHT PROXIMAL DORSAL FOREARM

## 2024-11-13 ASSESSMENT — LOCATION ZONE DERM
LOCATION ZONE: TRUNK
LOCATION ZONE: ARM
LOCATION ZONE: LEG

## 2024-11-13 ASSESSMENT — LOCATION SIMPLE DESCRIPTION DERM
LOCATION SIMPLE: RIGHT POSTERIOR THIGH
LOCATION SIMPLE: RIGHT LOWER BACK
LOCATION SIMPLE: LEFT FOREARM
LOCATION SIMPLE: LEFT POSTERIOR THIGH
LOCATION SIMPLE: RIGHT FOREARM
LOCATION SIMPLE: ABDOMEN

## 2024-11-13 NOTE — PROCEDURE: DUPIXENT INITIATION
Is Methotrexate Contraindicated?: Yes
Detail Level: Zone
Pregnancy And Lactation Warning Text: There have not been adverse fetal risks in women taking Dupixent while pregnant. It is unknown if this medication is excreted in breast milk.
Is Soriatane Contraindicated?: No
Dupixent Monitoring Guidelines: There is no laboratory monitoring requirement with Dupixent.
Dupixent Dosing: 600 mg SC day 0 then 300 mg SC every other week
Diagnosis (Required): Atopic Dermatitis/Eczematous Dermatitis
(0) independent

## 2024-11-13 NOTE — PROCEDURE: ADDITIONAL NOTES
Render Risk Assessment In Note?: no
Detail Level: Simple
Additional Notes: 11/13/24: Pt cleared up somewhat in the summer time, but is flaring again today. She reports that she is using her topicals but that they are not improving condition. She would like to trial systemic. Her ISGA today is a 3.\\n\\n5/14/24: Pt is still flared today. Discussed systemic rx if pt is still flared in the fall. Pt given non steroidal topical for daily maintenance.\\n\\n1/31/24: Condition is greatly improved today. Pt has minor flares on arms today. Pt reports that condition has been stable and declines allergy testing at this time. \\n\\nPrior:Pt did get blood work antigen testing done but results are preliminary for the  and . Pt reports that the kenalog injection has helped and decreased itch and rash. Pt is still continuing to use rx of triamcinolone and oil,  but we are going to increase steroid potency and switch to clobetasol. Pt states her rash and symptoms are about 60% less than previous months.Discussed with patient that we will wait to see the antigen BP blood work.  She has few active lesions today.  Will proceed with DIF if needed for further eval.

## 2024-11-15 ENCOUNTER — RX ONLY (OUTPATIENT)
Age: 64
Setting detail: RX ONLY
End: 2024-11-15

## 2024-11-15 RX ORDER — DUPILUMAB 300 MG/2ML
INJECTION, SOLUTION SUBCUTANEOUS
Qty: 2 | Refills: 10 | Status: ERX | COMMUNITY
Start: 2024-11-15

## 2024-11-15 RX ORDER — DUPILUMAB 300 MG/2ML
INJECTION, SOLUTION SUBCUTANEOUS
Qty: 2 | Refills: 0 | Status: ERX

## 2025-02-04 ENCOUNTER — HOSPITAL ENCOUNTER (OUTPATIENT)
Dept: RADIOLOGY | Facility: MEDICAL CENTER | Age: 65
End: 2025-02-04
Attending: NURSE PRACTITIONER
Payer: MEDICARE

## 2025-02-04 DIAGNOSIS — Z87.891 HISTORY OF CIGARETTE SMOKING: ICD-10-CM

## 2025-02-04 PROCEDURE — 71271 CT THORAX LUNG CANCER SCR C-: CPT

## 2025-04-22 ENCOUNTER — OFFICE VISIT (OUTPATIENT)
Dept: MEDICAL GROUP | Facility: PHYSICIAN GROUP | Age: 65
End: 2025-04-22
Payer: COMMERCIAL

## 2025-04-22 VITALS
WEIGHT: 193.78 LBS | SYSTOLIC BLOOD PRESSURE: 104 MMHG | DIASTOLIC BLOOD PRESSURE: 70 MMHG | OXYGEN SATURATION: 96 % | TEMPERATURE: 98 F | HEART RATE: 69 BPM | BODY MASS INDEX: 31.14 KG/M2 | HEIGHT: 66 IN

## 2025-04-22 DIAGNOSIS — Z13.0 SCREENING FOR ENDOCRINE, METABOLIC AND IMMUNITY DISORDER: ICD-10-CM

## 2025-04-22 DIAGNOSIS — I10 PRIMARY HYPERTENSION: ICD-10-CM

## 2025-04-22 DIAGNOSIS — E55.9 VITAMIN D DEFICIENCY: ICD-10-CM

## 2025-04-22 DIAGNOSIS — Z00.00 PREVENTATIVE HEALTH CARE: ICD-10-CM

## 2025-04-22 DIAGNOSIS — Z12.12 SCREENING FOR COLORECTAL CANCER: ICD-10-CM

## 2025-04-22 DIAGNOSIS — Z12.31 ENCOUNTER FOR SCREENING MAMMOGRAM FOR BREAST CANCER: ICD-10-CM

## 2025-04-22 DIAGNOSIS — E78.00 PURE HYPERCHOLESTEROLEMIA: ICD-10-CM

## 2025-04-22 DIAGNOSIS — R73.03 PREDIABETES: ICD-10-CM

## 2025-04-22 DIAGNOSIS — Z13.228 SCREENING FOR ENDOCRINE, METABOLIC AND IMMUNITY DISORDER: ICD-10-CM

## 2025-04-22 DIAGNOSIS — Z13.29 SCREENING FOR ENDOCRINE, METABOLIC AND IMMUNITY DISORDER: ICD-10-CM

## 2025-04-22 DIAGNOSIS — Z12.11 SCREENING FOR COLORECTAL CANCER: ICD-10-CM

## 2025-04-22 LAB
HBA1C MFR BLD: 5.7 % (ref ?–5.8)
POCT INT CON NEG: NEGATIVE
POCT INT CON POS: POSITIVE

## 2025-04-22 PROCEDURE — 83036 HEMOGLOBIN GLYCOSYLATED A1C: CPT | Performed by: NURSE PRACTITIONER

## 2025-04-22 PROCEDURE — 99214 OFFICE O/P EST MOD 30 MIN: CPT | Performed by: NURSE PRACTITIONER

## 2025-04-22 PROCEDURE — 3074F SYST BP LT 130 MM HG: CPT | Performed by: NURSE PRACTITIONER

## 2025-04-22 PROCEDURE — 3078F DIAST BP <80 MM HG: CPT | Performed by: NURSE PRACTITIONER

## 2025-04-22 RX ORDER — DUPILUMAB 300 MG/2ML
INJECTION, SOLUTION SUBCUTANEOUS
COMMUNITY

## 2025-04-22 RX ORDER — LISINOPRIL 10 MG/1
10 TABLET ORAL DAILY
Qty: 90 TABLET | Refills: 3 | Status: SHIPPED | OUTPATIENT
Start: 2025-04-22

## 2025-04-22 ASSESSMENT — FIBROSIS 4 INDEX: FIB4 SCORE: 1.05

## 2025-04-22 ASSESSMENT — PATIENT HEALTH QUESTIONNAIRE - PHQ9: CLINICAL INTERPRETATION OF PHQ2 SCORE: 0

## 2025-04-22 NOTE — ASSESSMENT & PLAN NOTE
Previous A1C 5.7% and A1C today is 5.7%. She has been eating more at work, better at home. She snacks more at work. She has been eating less processed foods. Exercise continues morning stretching and 3x a week treadmill/weights. Chronic back pain worsening with plans for ablation. Continue A1C every 6 months.

## 2025-04-22 NOTE — ASSESSMENT & PLAN NOTE
BP today 104/70. Continues Lisinopril 10 mg daily.  No chest pain, shortness of breath or dizziness.

## 2025-04-22 NOTE — PROGRESS NOTES
Subjective:     CC: Prediabetes    HPI:   Jayashree presents today with the following:    Prediabetes  Previous A1C 5.7% and A1C today is 5.7%. She has been eating more at work, better at home. She snacks more at work. She has been eating less processed foods. Exercise continues morning stretching and 3x a week treadmill/weights. Chronic back pain worsening with plans for ablation. Continue A1C every 6 months.     Hypertension  BP today 104/70. Continues Lisinopril 10 mg daily.  No chest pain, shortness of breath or dizziness.    Past Medical History:   Diagnosis Date    Arthritis     Chronic back pain greater than 3 months duration 3/23/2015    Former smoker     Glaucoma     sees [facility].    Healthcare maintenance 3/17/2021    Hypertension     Hypertension 4/15/2013    Scoliosis     Varicose vein        Social History     Tobacco Use    Smoking status: Some Days     Current packs/day: 0.00     Average packs/day: 1 pack/day for 34.0 years (34.0 ttl pk-yrs)     Types: Cigarettes, Cigars     Start date: 1981     Last attempt to quit: 2015     Years since quittin.7    Smokeless tobacco: Never    Tobacco comments:     Quit in    (Hx 1 ppd x 34 yrs). But has 1-2 cigar a week   Vaping Use    Vaping status: Former    Quit date: 2015   Substance Use Topics    Alcohol use: Yes     Comment: 4 per week; wine and vodka/seltzer    Drug use: No       Current Outpatient Medications Ordered in Epic   Medication Sig Dispense Refill    DUPIXENT 300 MG/2ML injection       atorvastatin (LIPITOR) 20 MG Tab TAKE 1 TABLET BY MOUTH EVERY DAY 90 Tablet 3    Fluocinolone Acetonide Body 0.01 % Oil APPLY TO DAMP SKIN NECK DOWN DAILY FOR 10 DAYS THEN TWICE WEEKLY FOR MAINTENANCE      lisinopril (PRINIVIL) 10 MG Tab TAKE 1 TABLET BY MOUTH EVERY DAY 90 Tablet 3    clobetasol (TEMOVATE) 0.05 % Ointment PLEASE SEE ATTACHED FOR DETAILED DIRECTIONS      COENZYME Q10 PO Take  by mouth every day.      Omega-3 Fatty Acids (FISH OIL) 1000  "MG Cap capsule Take 1,000 mg by mouth 3 times a day with meals.      latanoprost (XALATAN) 0.005 % Solution INSTILL 1 DROP INTO BOTH EYES EVERY NIGHT      Cholecalciferol (VITAMIN D3 PO) Take  by mouth.      HYDROcodone-acetaminophen (NORCO) 7.5-325 MG per tablet Take 1 Tab by mouth every 8 hours as needed.  0    Misc. Devices Misc Hot tub For alleviating pain and Arthritis discpmfprt 1 Each 0    acetaminophen (TYLENOL) 500 MG Tab Take 500-1,000 mg by mouth every 6 hours as needed.      Multiple Vitamins-Minerals (ONE-A-DAY 50 PLUS PO) Take  by mouth every day.      pimecrolimus (ELIDEL) 1 % cream APPLY TO AFFECTED AREAS OF FACE OR BODY DAILY FOR MILD FLARES OR MAINTENANCE (Patient not taking: Reported on 4/22/2025)      cyclobenzaprine (FLEXERIL) 10 mg Tab Take 1 Tablet by mouth 3 times a day as needed for Muscle Spasms. (Patient not taking: Reported on 4/22/2025) 30 Tablet 0    triamcinolone acetonide (KENALOG) 0.1 % Ointment APPLY TO ITCHY SPOTS TWICE A DAY FOR 14 DAYS AS NEEDED (Patient not taking: Reported on 4/22/2025)      Bacillus Coagulans-Inulin (BENEFIBER PREBIOTIC+PROBIOTIC) Chew Tab Chew every day. (Patient not taking: Reported on 4/22/2025)       No current [facility]-ordered facility-administered medications on file.       Allergies:  Patient has no known allergies.    Health Maintenance: Cologuard ordered did not complete last year. Referred to gynecology for pap-she has not scheduled encouraged to schedule, contact information provided., due for mammogram, [facility] has ordered bone density, declines pneumonia vaccine today.      Objective:     Vital signs reviewed  Exam:  /70 (BP Location: Right arm, Patient Position: Sitting, BP Cuff Size: Adult)   Pulse 69   Temp 36.7 °C (98 °F) (Temporal)   Ht 1.664 m (5' 5.5\")   Wt 87.9 kg (193 lb 12.6 oz)   LMP 12/28/2011   SpO2 96%   BMI 31.76 kg/m²  Body mass index is 31.76 kg/m².    Gen: Alert and oriented, No apparent distress.  Lungs: Normal " effort, CTA bilaterally, no wheezes, rhonchi, or rales  CV: Regular rate and rhythm. No murmurs, rubs, or gallops.  Ext: No clubbing, cyanosis, edema.      Labs:      Latest Reference Range & Units 04/22/25 07:34   Glycohemoglobin <=5.8 % 5.7       Assessment & Plan:     65 y.o. female with the following -     1. Primary hypertension  Chronic stable problem.  Continue lisinopril 10 mg daily.  Due for annual labs around October 2025.  - CBC WITH DIFFERENTIAL; Future  - Comp Metabolic Panel; Future  - TSH WITH REFLEX TO FT4; Future  - lisinopril (PRINIVIL) 10 MG Tab; Take 1 Tablet by mouth every day.  Dispense: 90 Tablet; Refill: 3    2. Prediabetes  Chronic stable problem.  Continue watching sugar, processed foods and carbohydrates.  A1c every 6 months. Due for annual labs around October 2025.  - POCT A1C  - HEMOGLOBIN A1C; Future    3. Pure hypercholesterolemia  Chronic stable problem.  Continue atorvastatin 20 mg daily. Due for annual labs around October 2025.  - Lipid Profile; Future    4. Vitamin D deficiency  Chronic stable problem. Continue daily multivitamin. Due for annual labs around October 2025.  - VITAMIN D,25 HYDROXY (DEFICIENCY); Future    5. Preventative health care  Acute uncomplicated problem. Due for annual labs around October 2025.  - CBC WITH DIFFERENTIAL; Future  - Comp Metabolic Panel; Future  - Lipid Profile; Future    6. Screening for endocrine, metabolic and immunity disorder  Acute uncomplicated problem. Due for annual labs around October 2025.  - HEMOGLOBIN A1C; Future  - TSH WITH REFLEX TO FT4; Future  - VITAMIN D,25 HYDROXY (DEFICIENCY); Future    7. Screening for colorectal cancer  Acute uncomplicated problem.  Cologuard reordered.  - Cologuard® colon cancer screening    8. Encounter for screening mammogram for breast cancer  Acute uncomplicated problem.  Discussed annual mammogram.  Order placed.  - MA-SCREENING MAMMO BILAT W/TOMOSYNTHESIS W/CAD; Future      Return in about 6 months  (around 10/22/2025) for annual, Labs.    Please note that this dictation was created using voice recognition software. I have made every reasonable attempt to correct obvious errors, but I expect that there are errors of grammar and possibly content that I did not discover before finalizing the note.

## 2025-04-22 NOTE — PATIENT INSTRUCTIONS
OB & Gyn - Gynecology   [facility]   [address] [place] [address]  [phone]

## 2025-06-19 ENCOUNTER — HOSPITAL ENCOUNTER (OUTPATIENT)
Dept: RADIOLOGY | Facility: MEDICAL CENTER | Age: 65
End: 2025-06-19
Attending: NURSE PRACTITIONER
Payer: MEDICARE

## 2025-06-19 DIAGNOSIS — Z12.31 ENCOUNTER FOR SCREENING MAMMOGRAM FOR BREAST CANCER: ICD-10-CM

## 2025-06-19 PROCEDURE — 77063 BREAST TOMOSYNTHESIS BI: CPT

## 2025-06-24 ENCOUNTER — RESULTS FOLLOW-UP (OUTPATIENT)
Dept: MEDICAL GROUP | Facility: PHYSICIAN GROUP | Age: 65
End: 2025-06-24

## 2025-07-08 ENCOUNTER — APPOINTMENT (OUTPATIENT)
Dept: URBAN - METROPOLITAN AREA CLINIC 22 | Facility: CLINIC | Age: 65
Setting detail: DERMATOLOGY
End: 2025-07-08

## 2025-07-08 DIAGNOSIS — L20.89 OTHER ATOPIC DERMATITIS: ICD-10-CM | Status: WELL CONTROLLED

## 2025-07-08 PROCEDURE — ? DIAGNOSIS COMMENT

## 2025-07-08 PROCEDURE — ? COUNSELING

## 2025-07-08 PROCEDURE — ? PRESCRIPTION SAMPLES PROVIDED

## 2025-07-08 PROCEDURE — ? ADDITIONAL NOTES

## 2025-07-08 PROCEDURE — ? PRESCRIPTION

## 2025-07-08 RX ORDER — DUPILUMAB 300 MG/2ML
INJECTION, SOLUTION SUBCUTANEOUS
Qty: 2 | Refills: 11 | Status: ERX

## 2025-07-08 ASSESSMENT — BSA ECZEMA: % BODY COVERED IN ECZEMA: 1

## 2025-07-08 ASSESSMENT — LOCATION DETAILED DESCRIPTION DERM
LOCATION DETAILED: LEFT DISTAL POSTERIOR THIGH
LOCATION DETAILED: RIGHT SUPERIOR MEDIAL MIDBACK
LOCATION DETAILED: LEFT PROXIMAL DORSAL FOREARM
LOCATION DETAILED: RIGHT PROXIMAL DORSAL FOREARM
LOCATION DETAILED: RIGHT DISTAL POSTERIOR THIGH
LOCATION DETAILED: PERIUMBILICAL SKIN

## 2025-07-08 ASSESSMENT — ITCH NUMERIC RATING SCALE: HOW SEVERE IS YOUR ITCHING?: 1

## 2025-07-08 ASSESSMENT — LOCATION SIMPLE DESCRIPTION DERM
LOCATION SIMPLE: ABDOMEN
LOCATION SIMPLE: RIGHT FOREARM
LOCATION SIMPLE: RIGHT LOWER BACK
LOCATION SIMPLE: RIGHT POSTERIOR THIGH
LOCATION SIMPLE: LEFT POSTERIOR THIGH
LOCATION SIMPLE: LEFT FOREARM

## 2025-07-08 ASSESSMENT — LOCATION ZONE DERM
LOCATION ZONE: LEG
LOCATION ZONE: TRUNK
LOCATION ZONE: ARM

## 2025-07-08 ASSESSMENT — SEVERITY ASSESSMENT 2020: SEVERITY 2020: ALMOST CLEAR

## 2025-07-08 NOTE — PROCEDURE: ADDITIONAL NOTES
Render Risk Assessment In Note?: no
Detail Level: Simple
Additional Notes: 7/8/25: pt states that since taking Dupixent, there have been hardly any flares and those of which are minimal\\nBSA currently 1%.  New insurance.  Will submit new rx.  ISGA is currently 1\\n\\n11/13/24: Pt cleared up somewhat in the summer time, but is flaring again today. She reports that she is using her topicals but that they are not improving condition. She would like to trial systemic. Her ISGA today is a 3.\\n\\n5/14/24: Pt is still flared today. Discussed systemic rx if pt is still flared in the fall. Pt given non steroidal topical for daily maintenance.\\n\\n1/31/24: Condition is greatly improved today. Pt has minor flares on arms today. Pt reports that condition has been stable and declines allergy testing at this time. \\n\\nPrior:Pt did get blood work antigen testing done but results are preliminary for the  and . Pt reports that the kenalog injection has helped and decreased itch and rash. Pt is still continuing to use rx of triamcinolone and oil,  but we are going to increase steroid potency and switch to clobetasol. Pt states her rash and symptoms are about 60% less than previous months.Discussed with patient that we will wait to see the antigen BP blood work.  She has few active lesions today.  Will proceed with DIF if needed for further eval.